# Patient Record
Sex: FEMALE | Race: WHITE | NOT HISPANIC OR LATINO | Employment: OTHER | URBAN - METROPOLITAN AREA
[De-identification: names, ages, dates, MRNs, and addresses within clinical notes are randomized per-mention and may not be internally consistent; named-entity substitution may affect disease eponyms.]

---

## 2017-02-07 ENCOUNTER — HISTORICAL (OUTPATIENT)
Dept: ADMINISTRATIVE | Facility: HOSPITAL | Age: 72
End: 2017-02-07

## 2017-05-20 ENCOUNTER — HOSPITAL ENCOUNTER (EMERGENCY)
Facility: OTHER | Age: 72
Discharge: HOME | End: 2017-05-20
Attending: EMERGENCY MEDICINE
Payer: MEDICARE

## 2017-05-20 VITALS
SYSTOLIC BLOOD PRESSURE: 160 MMHG | TEMPERATURE: 99 F | DIASTOLIC BLOOD PRESSURE: 81 MMHG | RESPIRATION RATE: 18 BRPM | WEIGHT: 190 LBS | OXYGEN SATURATION: 98 % | HEART RATE: 76 BPM

## 2017-05-20 DIAGNOSIS — F41.1 ANXIETY REACTION: Primary | ICD-10-CM

## 2017-05-20 DIAGNOSIS — J30.9 ALLERGIC RHINITIS, UNSPECIFIED ALLERGIC RHINITIS TRIGGER, UNSPECIFIED RHINITIS SEASONALITY: ICD-10-CM

## 2017-05-20 DIAGNOSIS — I10 HTN (HYPERTENSION): ICD-10-CM

## 2017-05-20 LAB
ALBUMIN SERPL-MCNC: 3.6 G/DL (ref 3.3–5.5)
ALP SERPL-CCNC: 93 U/L (ref 42–141)
BILIRUB SERPL-MCNC: 0.6 MG/DL (ref 0.2–1.6)
BUN SERPL-MCNC: 11 MG/DL (ref 7–22)
CALCIUM SERPL-MCNC: 9.7 MG/DL (ref 8–10.3)
CHLORIDE SERPL-SCNC: 105 MMOL/L (ref 98–108)
CREAT SERPL-MCNC: 0.5 MG/DL (ref 0.6–1.2)
GLUCOSE SERPL-MCNC: 93 MG/DL (ref 73–118)
POC ALT (SGPT): 23 U/L (ref 10–47)
POC AST (SGOT): 26 U/L (ref 11–38)
POC CARDIAC TROPONIN I: 0 NG/ML
POC RAPID STREP A: NEGATIVE
POC TCO2: 27 MMOL/L (ref 18–33)
POTASSIUM BLD-SCNC: 4.3 MMOL/L (ref 3.6–5.1)
PROTEIN, POC: 6.6 G/DL (ref 6.4–8.1)
SAMPLE: NORMAL
SODIUM BLD-SCNC: 144 MMOL/L (ref 128–145)

## 2017-05-20 PROCEDURE — 93005 ELECTROCARDIOGRAM TRACING: CPT

## 2017-05-20 PROCEDURE — 25000003 PHARM REV CODE 250: Performed by: EMERGENCY MEDICINE

## 2017-05-20 PROCEDURE — 99284 EMERGENCY DEPT VISIT MOD MDM: CPT

## 2017-05-20 PROCEDURE — 93010 ELECTROCARDIOGRAM REPORT: CPT | Mod: ,,, | Performed by: INTERNAL MEDICINE

## 2017-05-20 RX ORDER — LORAZEPAM 1 MG/1
1 TABLET ORAL
Status: COMPLETED | OUTPATIENT
Start: 2017-05-20 | End: 2017-05-20

## 2017-05-20 RX ORDER — CETIRIZINE HYDROCHLORIDE 5 MG/1
5 TABLET ORAL DAILY
COMMUNITY

## 2017-05-20 RX ORDER — ASPIRIN 81 MG/1
81 TABLET ORAL DAILY
Status: ON HOLD | COMMUNITY
End: 2023-04-11 | Stop reason: HOSPADM

## 2017-05-20 RX ORDER — ROSUVASTATIN CALCIUM 5 MG/1
5 TABLET, COATED ORAL DAILY
Status: ON HOLD | COMMUNITY
End: 2023-04-11 | Stop reason: CLARIF

## 2017-05-20 RX ORDER — LORAZEPAM 1 MG/1
0.5 TABLET ORAL EVERY 8 HOURS PRN
Qty: 5 TABLET | Refills: 0 | Status: SHIPPED | OUTPATIENT
Start: 2017-05-20 | End: 2024-03-13

## 2017-05-20 RX ORDER — FLUTICASONE PROPIONATE 50 MCG
2 SPRAY, SUSPENSION (ML) NASAL 2 TIMES DAILY
Qty: 1 BOTTLE | Refills: 0 | Status: SHIPPED | OUTPATIENT
Start: 2017-05-20

## 2017-05-20 RX ADMIN — LORAZEPAM 1 MG: 1 TABLET ORAL at 11:05

## 2017-05-20 NOTE — ED PROVIDER NOTES
Encounter Date: 5/20/2017       History     Chief Complaint   Patient presents with    Hypertension     states her pressure has been high and her throat is sore     Review of patient's allergies indicates:   Allergen Reactions    Bactrim [sulfamethoxazole-trimethoprim] Nausea And Vomiting    Lortab [hydrocodone-acetaminophen] Itching     CC Bloop pressure elevated this morning while visiting dying mother in the hospital.  Patient feels very stressed out over her mothers declining health. Also reports a sore throat and runny nose for the last few days.  No headache, numbness, tingling, or weakness      The history is provided by the patient.   Hypertension    This is a recurrent problem. The current episode started today. The problem has been gradually worsening. Associated symptoms include anxiety. Pertinent negatives include no chest pain, no palpitations, no confusion, no malaise/fatigue, no blurred vision, no headaches, no neck pain, no peripheral edema, no dizziness and no shortness of breath. Associated agents: stress. Risk factors include stress.     Past Medical History:   Diagnosis Date    Hyperlipemia     Hypertension      Past Surgical History:   Procedure Laterality Date    KNEE SURGERY      TONSILLECTOMY      TUBAL LIGATION       History reviewed. No pertinent family history.  Social History   Substance Use Topics    Smoking status: Never Smoker    Smokeless tobacco: None    Alcohol use No     Review of Systems   Constitutional: Negative for fever and malaise/fatigue.   HENT: Negative for sore throat.    Eyes: Negative for blurred vision.   Respiratory: Negative for shortness of breath.    Cardiovascular: Negative for chest pain and palpitations.   Gastrointestinal: Negative for nausea.   Genitourinary: Negative for dysuria.   Musculoskeletal: Negative for back pain, gait problem and neck pain.   Skin: Negative for rash.   Neurological: Negative for dizziness, syncope, weakness and headaches.    Hematological: Does not bruise/bleed easily.   Psychiatric/Behavioral: Negative for confusion. The patient is nervous/anxious.    All other systems reviewed and are negative.      Physical Exam   Initial Vitals   BP Pulse Resp Temp SpO2   05/20/17 1106 05/20/17 1106 05/20/17 1106 05/20/17 1106 05/20/17 1106   160/81 76 18 98.7 °F (37.1 °C) 98 %     Physical Exam    Nursing note and vitals reviewed.  Constitutional: She appears well-developed and well-nourished. No distress (anxious).   HENT:   Head: Normocephalic and atraumatic.   Right Ear: External ear normal.   Left Ear: External ear normal.   Mouth/Throat: Oropharynx is clear and moist.   Eyes: EOM are normal. Pupils are equal, round, and reactive to light. No scleral icterus.   Neck: Normal range of motion. Neck supple. JVD present.   Cardiovascular: Normal rate, regular rhythm, normal heart sounds and intact distal pulses. Exam reveals no gallop and no friction rub.    No murmur heard.  Pulmonary/Chest: Breath sounds normal. No respiratory distress. She has no wheezes. She has no rhonchi. She has no rales. She exhibits no tenderness.   Abdominal: Soft. Bowel sounds are normal. She exhibits no distension. There is no tenderness. There is no rebound.   Musculoskeletal: Normal range of motion. She exhibits no edema or tenderness.   Neurological: She is alert and oriented to person, place, and time. She has normal strength. She displays normal reflexes. No cranial nerve deficit or sensory deficit.   Skin: Skin is warm and dry.   Psychiatric: Her speech is normal and behavior is normal. Thought content normal. Her mood appears anxious.         ED Course   Procedures  Labs Reviewed   POCT CMP - Abnormal; Notable for the following:        Result Value    POC Creatinine 0.5 (*)     All other components within normal limits   POCT STREP A, RAPID - Abnormal; Notable for the following:     POC Rapid Strep A Negative (*)     All other components within normal limits    TROPONIN ISTAT   POCT CBC   POCT CMP   POCT TROPONIN   POCT STREP A, RAPID     EKG Readings: (Independently Interpreted)   Initial Reading: No STEMI. Rhythm: Normal Sinus Rhythm. Heart Rate: 65. Axis: Normal. Clinical Impression: Normal Sinus Rhythm Other Impression: Normal EKG no ST elevation     Imaging Results          CT Head Without Contrast (Final result)  Result time 05/20/17 12:54:01    Final result by Jeffrey Pyle MD (05/20/17 12:54:01)                 Narrative:    Study Desc:   CT HEAD WITHOUT CONTRAST  Clinical History: Headache, hypertension  Comparison exam: None.     Technique: Noncontrast axial CT of the head performed from the vertex through the skull   base.  Axial, sagittal and coronal multiplanar reconstructions provided.  Exam DLP: 646 mGy-cm     Findings:  No acute intracranial hemorrhage or extra-axial fluid collection.  The grey-white   interface is maintained.  No hydrocephalus, space-occupying mass, mass effect, or midline   shift.  Mild age-related minimal volume loss.  Scattered periventricular and subcortical   white matter hypodensities not advanced for age.  The ventricles, sulci, and basal   cisterns are unremarkable.     The visualized orbits, mastoids and paranasal sinuses are unremarkable.  The calvarium   appears intact.     If there is further clinical concern for intracranial pathology, MRI of the brain may be   performed for further assessment.     Impression:  1.  No acute intracranial abnormality identified.  No acute intracranial hemorrhage, mass   effect, or recent territorial ischemia.     SL: 23  Signed by: Jeffrey Pyle MD  2017-05-20 12:54:00 [CDT]                             X-Ray Chest PA And Lateral (Final result)  Result time 05/20/17 12:19:10    Final result by Juan Grier MD (05/20/17 12:19:10)                 Narrative:    Study Desc:   XR CHEST PA AND LATERAL  History: Hypertension.     Comparison exam: None.     TECHNIQUE:  2 view(s) of the  chest     FINDINGS:  The lungs are clear. No pleural effusion or pneumothorax.     Heart size is within normal limits. Mediastinal contours are unremarkable.     No acute osseous abnormality.     IMPRESSION:  1. No evidence of acute cardiopulmonary disease.     SL: 24 Signed by: Juan Grier MD  2017-05-20 12:18:13                                                     ED Course       MDM  Number of Diagnoses or Management Options  Allergic rhinitis, unspecified allergic rhinitis trigger, unspecified rhinitis seasonality: established, worsening  Anxiety reaction: established, worsening  HTN (hypertension): established, worsening  Diagnosis management comments: CC HTN and stress.  Feeling anxious, has a prior diagnosis of anxiety.  No Headach, SI/HI.    Treatment in the ED PE, ativan, aspirin.   Patient feels much better and is ready for discharge.  Discussed labs, and imaging results.    Fill and take prescriptions as directed.  Answered questions and discussed discharge plan.    Follow up with PCP in 1 days.         Amount and/or Complexity of Data Reviewed  Clinical lab tests: ordered and reviewed  Tests in the radiology section of CPT®: ordered and reviewed  Tests in the medicine section of CPT®: ordered and reviewed  Independent visualization of images, tracings, or specimens: no    Risk of Complications, Morbidity, and/or Mortality  Presenting problems: moderate  Diagnostic procedures: moderate  Management options: moderate    Patient Progress  Patient progress: improved    Clinical Impression:   The primary encounter diagnosis was Anxiety reaction. Diagnoses of HTN (hypertension) and Allergic rhinitis, unspecified allergic rhinitis trigger, unspecified rhinitis seasonality were also pertinent to this visit.            Misa Schwartz DO  05/23/17 [id number]

## 2017-05-20 NOTE — DISCHARGE INSTRUCTIONS
Understanding Anxiety Disorders  Almost everyone gets nervous now and then. Its normal to have knots in your stomach before a test, or for your heart to race on a first date. But an anxiety disorder is much more than a case of nerves. In fact, its symptoms may be overwhelming. But treatment can relieve many of these symptoms. Talking to your doctor is the first step.    What are anxiety disorders?  An anxiety disorder causes intense feelings of panic and fear. These feelings may arise for no apparent reason. And they tend to recur again and again. They may prevent you from coping with life and cause you great distress. As a result, you may avoid anything that triggers your fear. In extreme cases, you may never leave the house. Anxiety disorders may cause other symptoms, such as:  · Obsessive thoughts you cant control  · Constant nightmares or painful thoughts of the past  · Nausea, sweating, and muscle tension  · Difficulty sleeping or concentrating  What causes anxiety disorders?  Anxiety disorders tend to run in families. For some people, childhood abuse or neglect may play a role. For others, stressful life events or trauma may trigger anxiety disorders. Anxiety can trigger low self-esteem and poor coping skills.  Common anxiety disorders  · Panic disorder: This causes an intense fear of being in danger.  · Phobias: These are extreme fears of certain objects, places, or events.  · Obsessive-compulsive disorder: This causes you to have unwanted thoughts. You also may perform certain actions over and over.  · Posttraumatic stress disorder: This occurs in people who have survived a terrible ordeal. It can cause nightmares and flashbacks about the event.  · Generalized anxiety disorder: This causes constant worry that can greatly disrupt your life.   Getting better  You may believe that nothing can help you. Or, you might fear what others may think. But most anxiety symptoms can be eased. Having an anxiety  disorder is nothing to be ashamed of. Most people do best with treatment that combines medication and therapy. Although these arent cures, they can help you live a healthier life.  Date Last Reviewed: 2/11/2015 © 2000-2016 [employer]. [address] [place] [address], [place] [address]. All rights reserved. This information is not intended as a substitute for professional medical care. Always follow your healthcare professional's instructions.        Allergic Rhinitis  Allergic rhinitis is an allergic reaction that affects the nose, and often the eyes. Its often known as nasal allergies. Nasal allergies are often due to things in the environment that are breathed in. Depending what you are sensitive to, nasal allergies may occur only during certain seasons. Or they may occur year round. Common indoor allergens include house dust mites, mold, cockroaches, and pet dander. Outdoor allergens include pollen from trees, grasses, and weeds.   Symptoms include a drippy, stuffy, and itchy nose. They also include sneezing and red and itchy eyes. You may feel tired more often. Severe allergies may also affect your breathing and trigger a condition called asthma.   Tests can be done to see what allergens are affecting you. You may be referred to an allergy specialist for testing and further evaluation.  Home care  The healthcare provider may prescribe medicines to help relieve allergy symptoms.   Ask the provider for advice on how to avoid substances that you are allergic to. Below are a few tips for each type of allergen.  Pet dander:  · Do not have pets with fur and feathers.  · If you cannot avoid having a pet, keep it out of your bedroom and off upholstered furniture.  Pollen:  · When pollen counts are high, keep windows of your car and home closed. If possible, use an air conditioner instead.  · Wear a filter mask when mowing or doing yard work.  House dust mites:  · Wash bedding every week in warm water and  detergent and dry on a hot setting.  · Cover the mattress, box spring, and pillows with allergy covers.   · If possible, sleep in a room with no carpet, curtains, or upholstered furniture.  Cockroaches:  · Store food in sealed containers.  · Remove garbage from the home promptly.  · Fix water leaks  Mold:  · Keep humidity low by using a dehumidifier or air conditioner. Keep the dehumidifier and air conditioner clean and free of mold.  · Clean moldy areas with bleach and water.  In general:  · Vacuum once or twice a week. If possible, use a vacuum with a high-efficiency particulate air (HEPA) filter.  · Do not smoke. Avoid cigarette smoke. Cigarette smoke is an irritant that can make symptoms worse.  Follow-up care  Follow up as advised by the health care provider or our staff. If you were referred to an allergy specialist, make this appointment promptly.  When to seek medical advice  Call your healthcare provider right away if the following occur:  · Coughing or wheezing  · Fever greater than 100.4°F (38°C)  · Continuing symptoms, new symptoms, or worsening symptoms  Call 911 right away if you have:  · Trouble breathing  · Hives (raised red bumps)  · Severe swelling of the face or severe itching of the eyes or mouth  Date Last Reviewed: 4/26/2015  © 2120-8967 [employer]. [address] [place] [id number]. All rights reserved. This information is not intended as a substitute for professional medical care. Always follow your healthcare professional's instructions.        Taking Your Blood Pressure  Blood pressure is the force of blood against the artery wall as it moves from the heart through the blood vessels. You can take your own blood pressure reading using a digital monitor. Take readings as often as your healthcare provider instructs. Take each reading at the same time of day.  Step 1. Relax    · Take your blood pressure at the same time every day, such as in the morning or evening, or at  the time your healthcare provider recommends.  · Wait at least a half-hour after smoking, eating, drinking caffeinated beverages, or exercising.  · Sit comfortably at a table with both feet on the floor. Do not cross your legs or feet. Place the monitor near you.  · Rest for a few minutes before you begin.  Step 2. Wrap the cuff    · Place your arm on the table, palm up. Your arm should be at the level of your heart. Wrap the cuff around your upper arm, just above your elbow. Its best done on bare skin, not over clothing. Most cuffs will indicate where the brachial artery (the blood vessel in the middle of the arm at the inner side of the elbow) should line up with the cuff. Look in your monitor's instruction booklet for an illustration. You can also bring your cuff to your healthcare provider and have them show you how to correctly place the cuff.  · Make sure your cuff fits. If it doesnt wrap around your upper arm, order a larger cuff.  Step 3. Inflate the cuff    · Pump the cuff until the scale reads 160. If you have a self-inflating cuff, push the button that starts the pump.  · The cuff will tighten, then loosen.  · The numbers will change. When they stop changing, your blood pressure reading will appear.  · Take 2 or 3 readings one minute apart.  Step 4. Write down the results of each reading    · Write down your blood pressure numbers for each reading. Note the date and time. Keep your results in one place, such as a notebook. Even if your monitor has a built-in memory, keep a hard copy of the readings.  · Remove the cuff from your arm. Turn off the machine.  · Share your blood pressure records with your healthcare providers at each visit.  Date Last Reviewed: 4/27/2016  © 0742-1586 [employer]. [address] [place] [address], [place] [address]. All rights reserved. This information is not intended as a substitute for professional medical care. Always follow your healthcare professional's  instructions.

## 2017-05-20 NOTE — ED TRIAGE NOTES
Got B/P taken 173/87 by makayla in [facility] and told to be evaluated / pt seen several times for BP at PCP and told to just monitor it

## 2017-05-20 NOTE — ED AVS SNAPSHOT
[facility] EMERGENCY DEPARTMENT  [address] [facility] [address]               Jeana Muhammad   2017 11:11 AM   ED    Description:  Female : 1945   Department:  [facility] Emergency Department           Your Care was Coordinated By:     Provider Role From To    Misa Schwratz DO Attending Provider 17 1114 --      Reason for Visit     Hypertension           Diagnoses this Visit        Comments    Anxiety reaction    -  Primary     HTN (hypertension)         Allergic rhinitis, unspecified allergic rhinitis trigger, unspecified rhinitis seasonality           ED Disposition     None           To Do List           Follow-up Information     Follow up with [facility] Emergency Department.    Specialty:  Emergency Medicine    Why:  If symptoms worsen    Contact information:    [address] [facility] [address]  [phone]       These Medications        Disp Refills Start End    fluticasone (FLONASE) 50 mcg/actuation nasal spray 1 Bottle 0 2017     2 sprays by Each Nare route 2 (two) times daily. - Each Nare    lorazepam (ATIVAN) 1 MG tablet 5 tablet 0 2017    Take 0.5 tablets (0.5 mg total) by mouth every 8 (eight) hours as needed for Anxiety. - Oral      [facility]     [facility] On Call Nurse Care Line - 24/ Assistance  Unless otherwise directed by your provider, please contact [facility], our nurse care line that is available for  assistance.     Registered nurses in the [facility] On Call Center provide: appointment scheduling, clinical advisement, health education, and other advisory services.  Call: [phone] (toll free)               Medications           Message regarding Medications     Verify the changes and/or additions to your medication regime listed below are the same as discussed with your clinician today.  If any of these changes or additions are incorrect, please notify your healthcare provider.        START taking these  NEW medications        Refills    fluticasone (FLONASE) 50 mcg/actuation nasal spray 0    Si sprays by Each Nare route 2 (two) times daily.    Class: Print    Route: Each Nare    lorazepam (ATIVAN) 1 MG tablet 0    Sig: Take 0.5 tablets (0.5 mg total) by mouth every 8 (eight) hours as needed for Anxiety.    Class: Print    Route: Oral      These medications were administered today        Dose Freq    lorazepam tablet 1 mg 1 mg ED 1 Time    Sig: Take 1 tablet (1 mg total) by mouth ED 1 Time.    Class: Normal    Route: Oral           Verify that the below list of medications is an accurate representation of the medications you are currently taking.  If none reported, the list may be blank. If incorrect, please contact your healthcare provider. Carry this list with you in case of emergency.           Current Medications     aspirin (ECOTRIN) 81 MG EC tablet Take 81 mg by mouth once daily.    cetirizine (ZYRTEC) 5 MG tablet Take 5 mg by mouth once daily.    rosuvastatin (CRESTOR) 5 MG tablet Take 5 mg by mouth once daily.    fluticasone (FLONASE) 50 mcg/actuation nasal spray 2 sprays by Each Nare route 2 (two) times daily.    lorazepam (ATIVAN) 1 MG tablet Take 0.5 tablets (0.5 mg total) by mouth every 8 (eight) hours as needed for Anxiety.    lorazepam tablet 1 mg Take 1 tablet (1 mg total) by mouth ED 1 Time.           Clinical Reference Information           Your Vitals Were     BP Pulse Temp Resp Weight Last Period    160/81 76 98.7 °F (37.1 °C) 18 86.2 kg (190 lb) (LMP Unknown)    SpO2                   98%           Allergies as of 2017        Reactions    Bactrim [Sulfamethoxazole-trimethoprim] Nausea And Vomiting    Lortab [Hydrocodone-acetaminophen] Itching      Immunizations Administered on Date of Encounter - 2017     None      ED Micro, Lab, POCT     Start Ordered       Status Ordering Provider    17 1138 17 1138  POCT Rapid Strep A  Once      Final result     17 1135 17  1135  Troponin ISTAT  Once      Final result     05/20/17 1127 05/20/17 1127  POCT CMP  Once      Final result     05/20/17 1123 05/20/17 1122  POCT Rapid Strep A  Once      Completed     05/20/17 1122 05/20/17 1121  POCT CBC  Once      Final result     05/20/17 1122 05/20/17 1121  POCT CMP  Once      Completed     05/20/17 1122 05/20/17 1121  POCT Troponin  Once      Completed       ED Imaging Orders     Start Ordered       Status Ordering Provider    05/20/17 1122 05/20/17 1121  X-Ray Chest PA And Lateral  1 time imaging      In process         Discharge Instructions         Understanding Anxiety Disorders  Almost everyone gets nervous now and then. Its normal to have knots in your stomach before a test, or for your heart to race on a first date. But an anxiety disorder is much more than a case of nerves. In fact, its symptoms may be overwhelming. But treatment can relieve many of these symptoms. Talking to your doctor is the first step.    What are anxiety disorders?  An anxiety disorder causes intense feelings of panic and fear. These feelings may arise for no apparent reason. And they tend to recur again and again. They may prevent you from coping with life and cause you great distress. As a result, you may avoid anything that triggers your fear. In extreme cases, you may never leave the house. Anxiety disorders may cause other symptoms, such as:  · Obsessive thoughts you cant control  · Constant nightmares or painful thoughts of the past  · Nausea, sweating, and muscle tension  · Difficulty sleeping or concentrating  What causes anxiety disorders?  Anxiety disorders tend to run in families. For some people, childhood abuse or neglect may play a role. For others, stressful life events or trauma may trigger anxiety disorders. Anxiety can trigger low self-esteem and poor coping skills.  Common anxiety disorders  · Panic disorder: This causes an intense fear of being in danger.  · Phobias: These are extreme  fears of certain objects, places, or events.  · Obsessive-compulsive disorder: This causes you to have unwanted thoughts. You also may perform certain actions over and over.  · Posttraumatic stress disorder: This occurs in people who have survived a terrible ordeal. It can cause nightmares and flashbacks about the event.  · Generalized anxiety disorder: This causes constant worry that can greatly disrupt your life.   Getting better  You may believe that nothing can help you. Or, you might fear what others may think. But most anxiety symptoms can be eased. Having an anxiety disorder is nothing to be ashamed of. Most people do best with treatment that combines medication and therapy. Although these arent cures, they can help you live a healthier life.  Date Last Reviewed: 2/11/2015 © 2000-2016 [employer]. [address] [place] [address], [place] [address]. All rights reserved. This information is not intended as a substitute for professional medical care. Always follow your healthcare professional's instructions.        Allergic Rhinitis  Allergic rhinitis is an allergic reaction that affects the nose, and often the eyes. Its often known as nasal allergies. Nasal allergies are often due to things in the environment that are breathed in. Depending what you are sensitive to, nasal allergies may occur only during certain seasons. Or they may occur year round. Common indoor allergens include house dust mites, mold, cockroaches, and pet dander. Outdoor allergens include pollen from trees, grasses, and weeds.   Symptoms include a drippy, stuffy, and itchy nose. They also include sneezing and red and itchy eyes. You may feel tired more often. Severe allergies may also affect your breathing and trigger a condition called asthma.   Tests can be done to see what allergens are affecting you. You may be referred to an allergy specialist for testing and further evaluation.  Home care  The healthcare provider may prescribe  medicines to help relieve allergy symptoms.   Ask the provider for advice on how to avoid substances that you are allergic to. Below are a few tips for each type of allergen.  Pet dander:  · Do not have pets with fur and feathers.  · If you cannot avoid having a pet, keep it out of your bedroom and off upholstered furniture.  Pollen:  · When pollen counts are high, keep windows of your car and home closed. If possible, use an air conditioner instead.  · Wear a filter mask when mowing or doing yard work.  House dust mites:  · Wash bedding every week in warm water and detergent and dry on a hot setting.  · Cover the mattress, box spring, and pillows with allergy covers.   · If possible, sleep in a room with no carpet, curtains, or upholstered furniture.  Cockroaches:  · Store food in sealed containers.  · Remove garbage from the home promptly.  · Fix water leaks  Mold:  · Keep humidity low by using a dehumidifier or air conditioner. Keep the dehumidifier and air conditioner clean and free of mold.  · Clean moldy areas with bleach and water.  In general:  · Vacuum once or twice a week. If possible, use a vacuum with a high-efficiency particulate air (HEPA) filter.  · Do not smoke. Avoid cigarette smoke. Cigarette smoke is an irritant that can make symptoms worse.  Follow-up care  Follow up as advised by the health care provider or our staff. If you were referred to an allergy specialist, make this appointment promptly.  When to seek medical advice  Call your healthcare provider right away if the following occur:  · Coughing or wheezing  · Fever greater than 100.4°F (38°C)  · Continuing symptoms, new symptoms, or worsening symptoms  Call 911 right away if you have:  · Trouble breathing  · Hives (raised red bumps)  · Severe swelling of the face or severe itching of the eyes or mouth  Date Last Reviewed: 4/26/2015  © 8345-3656 [employer]. [address] [place] [address], [place] [address]. All rights reserved. This  information is not intended as a substitute for professional medical care. Always follow your healthcare professional's instructions.        Taking Your Blood Pressure  Blood pressure is the force of blood against the artery wall as it moves from the heart through the blood vessels. You can take your own blood pressure reading using a digital monitor. Take readings as often as your healthcare provider instructs. Take each reading at the same time of day.  Step 1. Relax    · Take your blood pressure at the same time every day, such as in the morning or evening, or at the time your healthcare provider recommends.  · Wait at least a half-hour after smoking, eating, drinking caffeinated beverages, or exercising.  · Sit comfortably at a table with both feet on the floor. Do not cross your legs or feet. Place the monitor near you.  · Rest for a few minutes before you begin.  Step 2. Wrap the cuff    · Place your arm on the table, palm up. Your arm should be at the level of your heart. Wrap the cuff around your upper arm, just above your elbow. Its best done on bare skin, not over clothing. Most cuffs will indicate where the brachial artery (the blood vessel in the middle of the arm at the inner side of the elbow) should line up with the cuff. Look in your monitor's instruction booklet for an illustration. You can also bring your cuff to your healthcare provider and have them show you how to correctly place the cuff.  · Make sure your cuff fits. If it doesnt wrap around your upper arm, order a larger cuff.  Step 3. Inflate the cuff    · Pump the cuff until the scale reads 160. If you have a self-inflating cuff, push the button that starts the pump.  · The cuff will tighten, then loosen.  · The numbers will change. When they stop changing, your blood pressure reading will appear.  · Take 2 or 3 readings one minute apart.  Step 4. Write down the results of each reading    · Write down your blood pressure numbers for each  reading. Note the date and time. Keep your results in one place, such as a notebook. Even if your monitor has a built-in memory, keep a hard copy of the readings.  · Remove the cuff from your arm. Turn off the machine.  · Share your blood pressure records with your healthcare providers at each visit.  Date Last Reviewed: 4/27/2016  © 0560-7544 [employer]. [address] [place] [address], [place] [address]. All rights reserved. This information is not intended as a substitute for professional medical care. Always follow your healthcare professional's instructions.          [facility] Sign-Up     Activating your [facility] account is as easy as 1-2-3!     1) Visit [email or web address], select Sign Up Now, enter this activation code and your date of birth, then select Next.  [email or web address]  Expires: 7/4/2017 12:12 PM      2) Create a username and password to use when you visit [facility] in the future and select a security question in case you lose your password and select Next.    3) Enter your e-mail address and click Sign Up!    Additional Information  If you have questions, please e-mail [email or web address] or call [phone] to talk to our [facility] staff. Remember, [facility] is NOT to be used for urgent needs. For medical emergencies, dial 911.          [facility] Emergency Department complies with applicable Federal civil rights laws and does not discriminate on the basis of race, color, national origin, age, disability, or sex.        Language Assistance Services     ATTENTION: Language assistance services are available, free of charge. Please call [phone].      ATENCIÓN: Si habla español, tiene a rea disposición servicios gratuitos de asistencia lingüística. Llame al [phone].     CHÚ Ý: N?u b?n nói Ti?ng Vi?t, có các d?ch v? h? tr? ngôn ng? mi?n phí dành cho b?n. G?i s? [phone].

## 2018-03-22 ENCOUNTER — HOSPITAL ENCOUNTER (EMERGENCY)
Facility: OTHER | Age: 73
Discharge: HOME | End: 2018-03-23
Attending: EMERGENCY MEDICINE
Payer: MEDICARE

## 2018-03-22 DIAGNOSIS — I10 HYPERTENSION: ICD-10-CM

## 2018-03-22 PROCEDURE — 93005 ELECTROCARDIOGRAM TRACING: CPT

## 2018-03-22 PROCEDURE — 93010 ELECTROCARDIOGRAM REPORT: CPT | Mod: ,,, | Performed by: INTERNAL MEDICINE

## 2018-03-22 PROCEDURE — 99284 EMERGENCY DEPT VISIT MOD MDM: CPT

## 2018-03-23 VITALS
WEIGHT: 193 LBS | HEIGHT: 66 IN | DIASTOLIC BLOOD PRESSURE: 87 MMHG | HEART RATE: 78 BPM | BODY MASS INDEX: 31.02 KG/M2 | RESPIRATION RATE: 16 BRPM | TEMPERATURE: 98 F | SYSTOLIC BLOOD PRESSURE: 187 MMHG | OXYGEN SATURATION: 98 %

## 2018-03-23 LAB
ALBUMIN SERPL-MCNC: 3.7 G/DL (ref 3.3–5.5)
ALP SERPL-CCNC: 111 U/L (ref 42–141)
BILIRUB SERPL-MCNC: 0.6 MG/DL (ref 0.2–1.6)
BUN SERPL-MCNC: 11 MG/DL (ref 7–22)
CALCIUM SERPL-MCNC: 10.8 MG/DL (ref 8–10.3)
CHLORIDE SERPL-SCNC: 104 MMOL/L (ref 98–108)
CREAT SERPL-MCNC: 0.6 MG/DL (ref 0.6–1.2)
GLUCOSE SERPL-MCNC: 99 MG/DL (ref 73–118)
POC ALT (SGPT): 16 U/L (ref 10–47)
POC AST (SGOT): 24 U/L (ref 11–38)
POC B-TYPE NATRIURETIC PEPTIDE: 95.2 PG/ML (ref 0–100)
POC CARDIAC TROPONIN I: 0 NG/ML
POC TCO2: 26 MMOL/L (ref 18–33)
POTASSIUM BLD-SCNC: 3.8 MMOL/L (ref 3.6–5.1)
PROTEIN, POC: 7 G/DL (ref 6.4–8.1)
SAMPLE: NORMAL
SODIUM BLD-SCNC: 144 MMOL/L (ref 128–145)

## 2018-03-23 PROCEDURE — 80053 COMPREHEN METABOLIC PANEL: CPT

## 2018-03-23 PROCEDURE — 83880 ASSAY OF NATRIURETIC PEPTIDE: CPT

## 2018-03-23 PROCEDURE — 84484 ASSAY OF TROPONIN QUANT: CPT

## 2018-03-23 PROCEDURE — 85025 COMPLETE CBC W/AUTO DIFF WBC: CPT

## 2018-03-23 PROCEDURE — 25000003 PHARM REV CODE 250: Performed by: EMERGENCY MEDICINE

## 2018-03-23 RX ADMIN — LIDOCAINE HYDROCHLORIDE: 20 SOLUTION ORAL; TOPICAL at 02:03

## 2018-03-23 NOTE — DISCHARGE INSTRUCTIONS
Check blood pressure once daily in the morning and keep a log.  Follow-up with your primary care doctor as soon as possible for blood pressure check and to initiate blood pressure medications if indicated.

## 2018-03-23 NOTE — ED PROVIDER NOTES
"Encounter Date: 3/22/2018       History     Chief Complaint   Patient presents with    Hypertension     pt presents with c/o elevated BP this evening; denies Hx of HTN; denies CP, SOB, unilateral weakness; reports increased "belching"; reports x1 episode of nasuea today; reports Hx of anxiety     72 y.o. Female with hypertension, hyperlipidemia presents to the emergency department complaining of "not feeling well" since 6 PM this evening.  Patient reports symptoms began after she ate dinner during which time she experienced dizziness, increased belching, had a mild headache and felt nauseated.  She reports taking her blood pressure at this time and noticed that it was 173/92.  She denies chest pain, palpitations, shortness of breath, diaphoresis, vomiting, focal weakness, abdominal pain, neck pain, fever, neck stiffness or vision disturbance. Symptoms currently resolved.       The history is provided by the patient.     Review of patient's allergies indicates:   Allergen Reactions    Bactrim [sulfamethoxazole-trimethoprim] Nausea And Vomiting    Lortab [hydrocodone-acetaminophen] Itching     Past Medical History:   Diagnosis Date    Hyperlipemia     Hypertension      Past Surgical History:   Procedure Laterality Date    KNEE SURGERY      TONSILLECTOMY      TUBAL LIGATION       No family history on file.  Social History   Substance Use Topics    Smoking status: Never Smoker    Smokeless tobacco: Not on file    Alcohol use No     Review of Systems   Constitutional: Negative for chills and fever.   HENT: Negative.    Eyes: Negative.    Respiratory: Negative for cough, shortness of breath and stridor.    Cardiovascular: Negative for chest pain and palpitations.   Gastrointestinal: Negative for nausea and vomiting.   Genitourinary: Negative for dysuria and hematuria.   Musculoskeletal: Negative.    Skin: Negative.    Neurological: Positive for dizziness. Negative for syncope, weakness, light-headedness, " numbness and headaches.   Psychiatric/Behavioral: Negative.    All other systems reviewed and are negative.      Physical Exam     Initial Vitals [03/22/18 2325]   BP Pulse Resp Temp SpO2   (!) 200/64 81 16 98.1 °F (36.7 °C) 97 %      MAP       109.33         Physical Exam    Nursing note and vitals reviewed.  Constitutional: She appears well-developed and well-nourished. She is not diaphoretic. No distress.   HENT:   Head: Normocephalic and atraumatic.   Eyes: Conjunctivae are normal. Pupils are equal, round, and reactive to light.   Neck: Normal range of motion. Neck supple.   Cardiovascular: Normal rate and intact distal pulses.   Pulmonary/Chest: No accessory muscle usage. No tachypnea. No respiratory distress.   Abdominal: She exhibits no distension. There is no tenderness.   Musculoskeletal: Normal range of motion. She exhibits no tenderness.   Neurological: She is alert and oriented to person, place, and time.   Skin: Skin is warm. Capillary refill takes less than 2 seconds.   Psychiatric: She has a normal mood and affect.         ED Course   Procedures  Labs Reviewed   POCT CMP - Abnormal; Notable for the following:        Result Value    Calcium, POC 10.8 (*)     All other components within normal limits   TROPONIN ISTAT   POCT CBC   POCT CMP   POCT TROPONIN   POCT B-TYPE NATRIURETIC PEPTIDE (BNP)   POCT B-TYPE NATRIURETIC PEPTIDE (BNP)     EKG Readings: (Independently Interpreted)   Initial Reading: No STEMI. Heart Rate: 78. Ectopy: No Ectopy. Conduction: Normal. ST Segments: Normal ST Segments. T Waves: Normal. Axis: Left Axis Deviation.          Medical Decision Making:   Differential Diagnosis:   ACS, CVA, hypertensive emergency, dehydration, symptomatic anemia, hypoxia, dysrhythmia, others  Clinical Tests:   Lab Tests: Ordered and Reviewed  Radiological Study: Ordered and Reviewed  ED Management:  No recurrent symptoms during ED course.              Labs Reviewed  Admission on 03/22/2018, Discharged on  03/23/2018   Component Date Value Ref Range Status    POC Cardiac Troponin I 03/23/2018 0.00  <0.09 ng/mL Final    Sample 03/23/2018 AVANI   Final    Albumin, POC 03/23/2018 3.7  3.3 - 5.5 g/dL Final    Alkaline Phosphatase, POC 03/23/2018 111  42 - 141 U/L Final    ALT (SGPT), POC 03/23/2018 16  10 - 47 U/L Final    AST (SGOT), POC 03/23/2018 24  11 - 38 U/L Final    POC BUN 03/23/2018 11  7 - 22 mg/dL Final    Calcium, POC 03/23/2018 10.8* 8.0 - 10.3 mg/dL Final    POC Chloride 03/23/2018 104  98 - 108 mmol/L Final    POC Creatinine 03/23/2018 0.6  0.6 - 1.2 mg/dL Final    POC Glucose 03/23/2018 99  73 - 118 mg/dL Final    POC Potassium 03/23/2018 3.8  3.6 - 5.1 mmol/L Final    POC Sodium 03/23/2018 144  128 - 145 mmol/L Final    Bilirubin 03/23/2018 0.6  0.2 - 1.6 mg/dL Final    POC TCO2 03/23/2018 26  18 - 33 mmol/L Final    Protein 03/23/2018 7.0  6.4 - 8.1 g/dL Final    POC B-Type Natriuretic Peptide 03/23/2018 95.2  0.0 - 100.0 pg/mL Final        Imaging Reviewed    Imaging Results          CT Head Without Contrast (Final result)  Result time 03/23/18 02:14:28    Final result by Abilio Dai MD (03/23/18 02:14:28)                 Impression:      No acute intracranial abnormality.      Electronically signed by: Abilio Dai MD  Date:    03/23/2018  Time:    02:14             Narrative:    EXAMINATION:  CT HEAD WITHOUT CONTRAST    CLINICAL HISTORY:  Dizziness;    TECHNIQUE:  Low dose axial images were obtained through the head.  Coronal and sagittal reformations were also performed. Contrast was not administered.    COMPARISON:  05/20/2017.    FINDINGS:  No evidence of acute territorial infarct, hemorrhage, mass effect, or midline shift.    Ventricles are normal in size and configuration.    No displaced calvarial fracture.    Visualized paranasal sinuses and mastoid air cells are clear.                              Vitals:    03/22/18 2325 03/22/18 2344 03/23/18 0035 03/23/18 0236  "  BP: (!) 200/64  (!) 189/91 (!) 187/87   BP Location: Right arm  Right arm Left arm   Patient Position: Lying  Sitting Sitting   Pulse: 81 78 74 78   Resp: 16  16 16   Temp: 98.1 °F (36.7 °C)   98.1 °F (36.7 °C)   TempSrc: Oral   Oral   SpO2: 97%  98% 98%   Weight: 87.5 kg (193 lb)      Height: 5' 6" (1.676 m)            Medications given in ED    Medications   (pyxis) gi cocktail (mylanta 30 mL, lidocaine 2 % viscous 10 mL, dicyclomine 10 mL) 50 mL ( Oral Given 3/23/18 0234)     Discharge Medications     Discharge Medication List as of 3/23/2018  2:37 AM      CONTINUE these medications which have NOT CHANGED    Details   aspirin (ECOTRIN) 81 MG EC tablet Take 81 mg by mouth once daily., Until Discontinued, Historical Med      cetirizine (ZYRTEC) 5 MG tablet Take 5 mg by mouth once daily., Until Discontinued, Historical Med      fluticasone (FLONASE) 50 mcg/actuation nasal spray 2 sprays by Each Nare route 2 (two) times daily., Starting 5/20/2017, Until Discontinued, Print      lorazepam (ATIVAN) 1 MG tablet Take 0.5 tablets (0.5 mg total) by mouth every 8 (eight) hours as needed for Anxiety., Starting 5/20/2017, Until Mon 6/19/17, Print      rosuvastatin (CRESTOR) 5 MG tablet Take 5 mg by mouth once daily., Until Discontinued, Historical Med                   Patient discharged to home in stable condition with instructions to:   1. Please take all meds as prescribed.  2. Follow-up with your primary care doctor   3. Return precautions discussed and patient and/or family/caretaker understands to return to the emergency room for any concerns including worsening of your current symptoms, fever, chills, night sweats, worsening pain, chest pain, shortness of breath, nausea, vomiting, diarrhea, bleeding, headache, difficulty talking, visual disturbances, weakness, numbness or any other acute concerns      Note was created using voice recognition software. Note may have occasional typographical errors that may not have " been identified and edited despite good minerva initial review prior to signing.             Clinical Impression:   The encounter diagnosis was Hypertension.                           Hunter Barclay MD  04/29/18 0730

## 2018-03-27 ENCOUNTER — HISTORICAL (OUTPATIENT)
Dept: ADMINISTRATIVE | Facility: HOSPITAL | Age: 73
End: 2018-03-27

## 2019-04-15 ENCOUNTER — HISTORICAL (OUTPATIENT)
Dept: ADMINISTRATIVE | Facility: HOSPITAL | Age: 74
End: 2019-04-15

## 2019-05-20 ENCOUNTER — HISTORICAL (OUTPATIENT)
Dept: ADMINISTRATIVE | Facility: HOSPITAL | Age: 74
End: 2019-05-20

## 2019-05-20 LAB
ABS NEUT (OLG): 4.57 X10(3)/MCL (ref 2.1–9.2)
ALBUMIN SERPL-MCNC: 3.5 GM/DL (ref 3.4–5)
ALBUMIN/GLOB SERPL: 1.1 RATIO (ref 1.1–2)
ALP SERPL-CCNC: 123 UNIT/L (ref 38–126)
ALT SERPL-CCNC: 22 UNIT/L (ref 12–78)
AST SERPL-CCNC: 12 UNIT/L (ref 15–37)
BASOPHILS # BLD AUTO: 0.1 X10(3)/MCL (ref 0–0.2)
BASOPHILS NFR BLD AUTO: 1 %
BILIRUB SERPL-MCNC: 0.4 MG/DL (ref 0.2–1)
BILIRUBIN DIRECT+TOT PNL SERPL-MCNC: 0.1 MG/DL (ref 0–0.5)
BILIRUBIN DIRECT+TOT PNL SERPL-MCNC: 0.3 MG/DL (ref 0–0.8)
BUN SERPL-MCNC: 17 MG/DL (ref 7–18)
CALCIUM SERPL-MCNC: 9.9 MG/DL (ref 8.5–10.1)
CHLORIDE SERPL-SCNC: 106 MMOL/L (ref 98–107)
CHOLEST SERPL-MCNC: 158 MG/DL (ref 0–200)
CHOLEST/HDLC SERPL: 2.9 {RATIO} (ref 0–4)
CO2 SERPL-SCNC: 29 MMOL/L (ref 21–32)
CREAT SERPL-MCNC: 0.48 MG/DL (ref 0.55–1.02)
EOSINOPHIL # BLD AUTO: 0.2 X10(3)/MCL (ref 0–0.9)
EOSINOPHIL NFR BLD AUTO: 3 %
ERYTHROCYTE [DISTWIDTH] IN BLOOD BY AUTOMATED COUNT: 13.9 % (ref 11.5–17)
GLOBULIN SER-MCNC: 3.2 GM/DL (ref 2.4–3.5)
GLUCOSE SERPL-MCNC: 90 MG/DL (ref 74–106)
HCT VFR BLD AUTO: 39 % (ref 37–47)
HDLC SERPL-MCNC: 55 MG/DL (ref 35–60)
HGB BLD-MCNC: 11.9 GM/DL (ref 12–16)
LDLC SERPL CALC-MCNC: 71 MG/DL (ref 0–129)
LYMPHOCYTES # BLD AUTO: 1.9 X10(3)/MCL (ref 0.6–4.6)
LYMPHOCYTES NFR BLD AUTO: 26 %
MCH RBC QN AUTO: 29.6 PG (ref 27–31)
MCHC RBC AUTO-ENTMCNC: 30.5 GM/DL (ref 33–36)
MCV RBC AUTO: 97 FL (ref 80–94)
MONOCYTES # BLD AUTO: 0.6 X10(3)/MCL (ref 0.1–1.3)
MONOCYTES NFR BLD AUTO: 8 %
NEUTROPHILS # BLD AUTO: 4.57 X10(3)/MCL (ref 2.1–9.2)
NEUTROPHILS NFR BLD AUTO: 61 %
PLATELET # BLD AUTO: 320 X10(3)/MCL (ref 130–400)
PMV BLD AUTO: 11.7 FL (ref 9.4–12.4)
POTASSIUM SERPL-SCNC: 4.3 MMOL/L (ref 3.5–5.1)
PROT SERPL-MCNC: 6.7 GM/DL (ref 6.4–8.2)
RBC # BLD AUTO: 4.02 X10(6)/MCL (ref 4.2–5.4)
SODIUM SERPL-SCNC: 139 MMOL/L (ref 136–145)
TRIGL SERPL-MCNC: 159 MG/DL (ref 30–150)
TSH SERPL-ACNC: 1.88 MIU/L (ref 0.36–3.74)
VLDLC SERPL CALC-MCNC: 32 MG/DL
WBC # SPEC AUTO: 7.4 X10(3)/MCL (ref 4.5–11.5)

## 2019-06-28 ENCOUNTER — HISTORICAL (OUTPATIENT)
Dept: ADMINISTRATIVE | Facility: HOSPITAL | Age: 74
End: 2019-06-28

## 2019-07-15 ENCOUNTER — HISTORICAL (OUTPATIENT)
Dept: ADMINISTRATIVE | Facility: HOSPITAL | Age: 74
End: 2019-07-15

## 2019-07-15 LAB
APPEARANCE, UA: CLEAR
BACTERIA #/AREA URNS AUTO: ABNORMAL /[HPF]
BILIRUB UR QL STRIP: NEGATIVE
COLOR UR: YELLOW
GLUCOSE (UA): NORMAL
HGB UR QL STRIP: NEGATIVE
HYALINE CASTS #/AREA URNS LPF: ABNORMAL /[LPF]
KETONES UR QL STRIP: NEGATIVE
LEUKOCYTE ESTERASE UR QL STRIP: NEGATIVE
NITRITE UR QL STRIP: NEGATIVE
PH UR STRIP: 5 [PH] (ref 4.5–8)
PROT UR QL STRIP: NEGATIVE
RBC #/AREA URNS AUTO: ABNORMAL /[HPF]
SP GR UR STRIP: 1.01 (ref 1–1.03)
SQUAMOUS #/AREA URNS LPF: ABNORMAL /[LPF]
UROBILINOGEN UR STRIP-ACNC: NORMAL
WBC #/AREA URNS AUTO: ABNORMAL /HPF

## 2019-07-17 ENCOUNTER — HISTORICAL (OUTPATIENT)
Dept: RADIOLOGY | Facility: HOSPITAL | Age: 74
End: 2019-07-17

## 2019-07-17 LAB — FINAL CULTURE: NO GROWTH

## 2019-07-31 ENCOUNTER — HISTORICAL (OUTPATIENT)
Dept: ADMINISTRATIVE | Facility: HOSPITAL | Age: 74
End: 2019-07-31

## 2019-08-12 ENCOUNTER — HISTORICAL (OUTPATIENT)
Dept: ADMINISTRATIVE | Facility: HOSPITAL | Age: 74
End: 2019-08-12

## 2019-08-14 LAB — FINAL CULTURE: NORMAL

## 2019-09-04 ENCOUNTER — HISTORICAL (OUTPATIENT)
Dept: ADMINISTRATIVE | Facility: HOSPITAL | Age: 74
End: 2019-09-04

## 2019-09-06 LAB — FINAL CULTURE: NORMAL

## 2019-09-16 ENCOUNTER — HISTORICAL (OUTPATIENT)
Dept: LAB | Facility: HOSPITAL | Age: 74
End: 2019-09-16

## 2019-09-19 LAB — FINAL CULTURE: NORMAL

## 2020-03-10 ENCOUNTER — HISTORICAL (OUTPATIENT)
Dept: LAB | Facility: HOSPITAL | Age: 75
End: 2020-03-10

## 2020-03-10 LAB
ABS NEUT (OLG): 3.99 X10(3)/MCL (ref 2.1–9.2)
ALBUMIN SERPL-MCNC: 3.6 GM/DL (ref 3.4–5)
ALBUMIN/GLOB SERPL: 0.9 RATIO (ref 1–2)
ALP SERPL-CCNC: 130 UNIT/L (ref 45–117)
ALT SERPL-CCNC: 23 UNIT/L (ref 13–56)
AST SERPL-CCNC: 16 UNIT/L (ref 15–37)
BILIRUB SERPL-MCNC: 0.2 MG/DL (ref 0.2–1)
BILIRUBIN DIRECT+TOT PNL SERPL-MCNC: <0.1 MG/DL (ref 0–0.2)
BILIRUBIN DIRECT+TOT PNL SERPL-MCNC: >0.1 MG/DL (ref 0–1)
BUN SERPL-MCNC: 9 MG/DL (ref 7–18)
CALCIUM SERPL-MCNC: 10.5 MG/DL (ref 8.5–10.1)
CHLORIDE SERPL-SCNC: 104 MMOL/L (ref 98–107)
CHOLEST SERPL-MCNC: 167 MG/DL (ref 0–199)
CHOLEST/HDLC SERPL: 3 {RATIO}
CO2 SERPL-SCNC: 31 MMOL/L (ref 21–32)
CREAT SERPL-MCNC: 0.57 MG/DL (ref 0.55–1.02)
ERYTHROCYTE [DISTWIDTH] IN BLOOD BY AUTOMATED COUNT: 14 % (ref 11.5–17)
GLOBULIN SER-MCNC: 4 GM/DL (ref 2–4)
GLUCOSE SERPL-MCNC: 90 MG/DL (ref 74–106)
HCT VFR BLD AUTO: 38.3 % (ref 37–47)
HDLC SERPL-MCNC: 63 MG/DL
HGB BLD-MCNC: 12.2 GM/DL (ref 12–16)
LDLC SERPL CALC-MCNC: 71 MG/DL (ref 0–129)
MCH RBC QN AUTO: 29.8 PG (ref 27–31)
MCHC RBC AUTO-ENTMCNC: 31.9 GM/DL (ref 33–36)
MCV RBC AUTO: 93.4 FL (ref 80–94)
NRBC BLD AUTO-RTO: 0 % (ref 0–0.2)
PLATELET # BLD AUTO: 346 X10(3)/MCL (ref 130–400)
PMV BLD AUTO: 10.5 FL (ref 7.4–10.4)
POTASSIUM SERPL-SCNC: 4.1 MMOL/L (ref 3.5–5.1)
PROT SERPL-MCNC: 7.2 GM/DL (ref 6.4–8.2)
RBC # BLD AUTO: 4.1 X10(6)/MCL (ref 4.2–5.4)
SODIUM SERPL-SCNC: 141 MMOL/L (ref 136–145)
TRIGL SERPL-MCNC: 167 MG/DL (ref 0–149)
TSH SERPL-ACNC: 2 MIU/ML (ref 0.36–3.74)
VLDLC SERPL CALC-MCNC: 33 MG/DL
WBC # SPEC AUTO: 6.5 X10(3)/MCL (ref 4.5–11.5)

## 2020-06-16 ENCOUNTER — HISTORICAL (OUTPATIENT)
Dept: ADMINISTRATIVE | Facility: HOSPITAL | Age: 75
End: 2020-06-16

## 2020-06-18 ENCOUNTER — HISTORICAL (OUTPATIENT)
Dept: ADMINISTRATIVE | Facility: HOSPITAL | Age: 75
End: 2020-06-18

## 2020-06-19 LAB — FINAL CULTURE: NORMAL

## 2020-06-20 LAB — FINAL CULTURE: NORMAL

## 2020-08-24 ENCOUNTER — HISTORICAL (OUTPATIENT)
Dept: ADMINISTRATIVE | Facility: HOSPITAL | Age: 75
End: 2020-08-24

## 2021-01-12 ENCOUNTER — HISTORICAL (OUTPATIENT)
Dept: ADMINISTRATIVE | Facility: HOSPITAL | Age: 76
End: 2021-01-12

## 2021-01-14 LAB — FINAL CULTURE: NORMAL

## 2021-03-25 ENCOUNTER — HISTORICAL (OUTPATIENT)
Dept: RADIOLOGY | Facility: HOSPITAL | Age: 76
End: 2021-03-25

## 2022-04-10 ENCOUNTER — HISTORICAL (OUTPATIENT)
Dept: ADMINISTRATIVE | Facility: HOSPITAL | Age: 77
End: 2022-04-10
Payer: MEDICARE

## 2022-04-27 VITALS
BODY MASS INDEX: 31.04 KG/M2 | DIASTOLIC BLOOD PRESSURE: 78 MMHG | HEIGHT: 67 IN | WEIGHT: 197.75 LBS | SYSTOLIC BLOOD PRESSURE: 132 MMHG

## 2022-05-04 NOTE — HISTORICAL [facility] CERNER
This is a historical note converted from [employer]. Formatting and pictures may have been removed.  Please reference [employer] for original formatting and attached multimedia. Chief Complaint  Annual exam  History of Present Illness  73yo  here today for WWE. She reports some right breast itching over the last several weeks. Denies breast lumps, nipple retraction or discharge. She?does report that over the last several years she has noticed increased sensitivity in her skin with soaps and lotions. She has?had increased itching on her upper extrimities. She also reports recurrent UTIs since menopause that has been worked up by urology. She describes having UDS and cystoscopy in the past, but she has not seen urology since her insurance changed at the beginning of the year. No PMB.  ?  Last MM, per patient  Last Colonoscopy: , f/u in   Dexa Scan:  Osteopenia on vitamin D/calcium supplementation  ?  GynHx: denies STIs. Denies h/o abnormal paps prior to 3/18 which showed ASCUS. Likely secondary to atrophic vaginal tissue.  triad:14/IRR  menopause: ~58yo  OBHx: 2 term ? 1 SAB  PMHx: CAD, Osteoarthritis, nephrolithiasis  FamHx: Sister with breast cancer in her 60s  All: Bactrim, lortab  Social: reports feeling safe at?home. lives with her  but is not sexually active. She denies tobacco, alcohol, or h/o IVDU.  ?  Review of Systems  Constitutional:?no fever, fatigue, weakness  Respiratory:?no cough, no wheezing, no shortness of breath  Cardiovascular:?no chest pain, no palpitations, no edema  Gastrointestinal:?no nausea, vomiting, or diarrhea. No abdominal pain  Genitourinary:?no dysuria, no urinary frequency or urgency, no hematuria  Musculoskeletal:?no muscle or joint pain, no joint swelling  Neurologic: no headache, no dizziness, no weakness or numbness  ?  Physical Exam  Vitals & Measurements  T:?36.6? ?C (Oral)? HR:?78(Peripheral)? RR:?20? BP:?156/94?  HT:?169?cm? WT:?89.7?kg?  BMI:?31.41?  GEN: The patient was alert and oriented x3, pleasant well-appearing female in no acute distress.  CV: RRR, no murmurs  RESP: Clear to auscultation bilaterally  BREAST: Symmetric breasts with no palpable breast masses or obvious breast lesions. She has no retractions or nipple discharge. She has no axillary abnormalities or palpable masses. No excoriations.  ABD: Soft, nontender, nondistended, normoactive bowel sounds  EXT: nontender, no edema  BACK: No CVA tenderness, no tenderness to palpation along spine  PELVIC: Normal appearing external female genitalia, atrophic vaginal epithelium, no abnormal discharge.?Slight?cystocele visible on exam. Bladder neck tender to palpation. Woody urethra noted. Normal appearing cervix. Bimanual: No CMT, 7week uterus, nontender. No palpable adnexal masses.  Rectal exam: no masses palpated  ?  Assessment/Plan  1.?Atrophic vaginitis?N95.2  ?Premarin cream prescribed. Discussed with patient to use every other day for two weeks then 1-2x/week.  Ordered:  conjugated estrogens topical, 1 gm =, VAG, qPM, Apply every other night for two weeks. Then 1-2 times per week thereafter, # 42.5 gm, 3 Refill(s), Pharmacy: [facility]  ?  2.?Recurrent UTI?N39.0  Repeat U/A and C&S today. Premarin cream prescribed to help with recurrent UTIs  Referral placed for urology.  Ordered:  conjugated estrogens topical, 1 gm =, VAG, qPM, Apply every other night for two weeks. Then 1-2 times per week thereafter, # 42.5 gm, 3 Refill(s), Pharmacy: [facility]  Clinic Follow up, *Est. 07/27/20 9:00:00 CDT, Order for future visit, Breast cancer screening, [facility]  Internal Referral to Urology, Recurrent UTIs, cystocele, *Est. 07/15/19 3:00:00 CDT, Future Visit?, Recurrent UTI  Urine Culture [id number], Routine collect, 07/15/19 9:13:00 CDT, Urine, Clean Catch, Nurse collect, Stop date 07/15/19 9:13:00 CDT, Recurrent UTI  ?  3.?Urethritis?N34.2  ?Given persistent UTIs, and  woody/tender urethra, will treat with Azithromycin for urethritis.  ?  4.?Breast cancer screening?Z12.39  ?MMG ordered.  Ordered:  Clinic Follow up, *Est. 07/27/20 9:00:00 CDT, Order for future visit, Breast cancer screening, [facility]  MG Screening, Routine, 07/15/19 8:29:00 CDT, Family History, None, Ambulatory, Rad Type, Order for future visit, Breast cancer screening, Schedule this test, [facility], Follow Breast Imaging Order Set Protocol, 07/15/19 8:29:00 CDT  ?  Orders:  azithromycin, 500 mg = 1 tab(s), Oral, Daily, X 5 day(s), # 5 tab(s), 0 Refill(s), Pharmacy: [facility]  RTC in 1year or PRN  Discussed with Dr. Carrasco.  ?  Mamta Young MD  [facility]  ?  Referrals  Internal Referral to Urology, Recurrent UTIs, cystocele, *Est. 07/15/19 3:00:00 CDT, Future Visit?, Recurrent UTI  Clinic Follow up, *Est. 07/27/20 9:00:00 CDT, Order for future visit, Breast cancer screening, [facility]   Problem List/Past Medical History  Ongoing  CAD - Coronary artery disease  Headache  Localized osteoarthritis of right knee  Nausea  Obesity  Osteoarthritis of left knee  Pre-op exam  Visual impairment  Historical  Pregnant  Pregnant  Pregnant  Procedure/Surgical History  forehead cyst removal  kidney stone removal  Repair of knee joint  Tonsillectomy  Tubal ligation   Medications  Aspir 81 oral delayed release tablet, 81 mg= 1 tab(s), Oral, Daily,? ?Not taking  ATORVASTATIN 20 MG TABLET, 20 mg= 1 tab(s), Oral, Daily  Azithromycin 3 Day Dose Pack 500 mg oral tablet, 500 mg= 1 tab(s), Oral, Daily  gabapentin 300 mg oral capsule, 300 mg= 1 cap(s), Oral, Once a day (at bedtime), 5 refills  Macrobid 100 mg oral capsule, 100 mg= 1 cap(s), Oral, BID  Oystercal-D, 1 tab(s), Oral, Daily  PANTOPRAZOLE SOD DR 40 MG TAB, 40 mg= 1 tab(s), Oral, Daily  Premarin 0.625 mg/g vaginal cream with applicator, 1 gm, VAG, qPM, 3 refills  Vitamin C 500 mg oral tablet, 500 mg= 1 tab(s),  Oral, Daily  Vitamin D3 1000 intl units oral capsule, 1000 IntUnit= 1 cap(s), Oral, Daily  Zyrtec 10 mg oral tablet, 10 mg= 1 tab(s), Oral, Daily  Allergies  Bactrim  Lortab  Social History  Abuse/Neglect  No, 06/28/2019  Alcohol - Denies Alcohol Use, 06/22/2015  Never, 11/14/2016  Substance Use  Never, 11/14/2016  Tobacco - Denies Tobacco Use, 06/22/2015  Never (less than 100 in lifetime), N/A, 07/15/2019  Family History  Dementia: Father.  Lab Results  Test Name Test Result Date/Time Comments   UA Appear Clear 07/15/2019 08:45 CDT Result created by Discern Rule.   UA Color YELLOW 07/15/2019 08:45 CDT    UA Spec Grav 1.014 07/15/2019 08:45 CDT    UA Bili Negative 07/15/2019 08:45 CDT Result created by Discern Rule.   UA pH 5.0 07/15/2019 08:45 CDT    UA Urobilinogen Normal 07/15/2019 08:45 CDT Result created by Discern Rule.   UA Blood Negative 07/15/2019 08:45 CDT Result created by Discern Rule.   UA Glucose Normal 07/15/2019 08:45 CDT Result created by Discern Rule.   UA Ketones Negative 07/15/2019 08:45 CDT Result created by Discern Rule.   UA Protein Negative 07/15/2019 08:45 CDT Result created by Discern Rule.   UA Nitrite Negative 07/15/2019 08:45 CDT Result created by Discern Rule.   UA Leuk Est Negative 07/15/2019 08:45 CDT Result created by Discern Rule.   UA WBC Interp 0-2 07/15/2019 08:45 CDT Result created by Discern Rule.   UA RBC Interp 0-2 07/15/2019 08:45 CDT Result created by Discern Rule.   UA Bact Interp None Seen 07/15/2019 08:45 CDT    UA Squam Epi Interp  (Abnormal) 07/15/2019 08:45 CDT Result created by Discern Rule.   UA CA Ox Crystal Few (Abnormal) 07/15/2019 08:45 CDT    UA Hyal Cast Interp 3-5 (Abnormal) 07/15/2019 08:45 CDT Result created by Discern Rule.       I saw and evaluated the patient. Discussed with resident and agree with resident?s findings and plan as documented in the resident?s note  ?  ?

## 2022-05-04 NOTE — HISTORICAL [facility] CERNER
This is a historical note converted from [employer]. Formatting and pictures may have been removed.  Please reference [employer] for original formatting and attached multimedia. Chief Complaint  PT IS HERE FOR BILATERAL KNEE PAIN. PT STATED THE RIGHT KNEE IS WORSE. SHE STATED HER KNEE  CATCHES AT TIMES.  History of Present Illness  Right and left_ KNEE  ?  Knee joint pain, Worse with weightbearing  Slowly worsens with extended activity  Pain is increased by bending it and by twisting  complains of knee joint swelling and stiffness  Catching and grating during movement  Minimal relief with prior?corticosteroid injections  Fixed valgus deformities  Review of Systems  Systemic: No fever, no chills, and no recent weight change.  Head: No headache - frequent.  Eyes: No vision problems.  Otolarnygeal: No hearing loss, no earache, no epistaxis, no hoarseness, and no tooth pain. Gums normal.  Cardiovascular: No chest pain or discomfort and no palpitations.  Pulmonary: No pulmonary symptoms - difficulty sleeping, no dyspnea, and cough not worse in the morning.  Gastrointestinal: Appetite not decreased. No dysphagia and no constant eructation. No nausea, no vomiting, no abdominal pain, no hematochezia, and no loose/mushy stools - frequent. No constipation - frequent.  Genitourinary: No genitourinary symptoms - Getting up every night to urinate and no increase in urinary frequency. No urinary hesitancy. No urinary loss of control - difficulty stopping urination and no burning sensation during urination.  Musculoskeletal: No calf muscle cramps and no localized soft tissue swelling of the ankle.  Neurological: No fainting and no convulsions.  Psychological: Not feeling nervous tension, not feeling nervous from exhaustion, and no depression.  Skin: No rash. Previous history of no ulcers.  Physical Exam  Vitals & Measurements  BP:?128/60?  HT:?167?cm? WT:?89.81?kg? BMI:?32.2?  PHYSICAL FINDINGS  Right knee exam:  Cardiovascular:  Arterial  Pulses: Posterior tibialis pulses were normal right. Dorsalis pedis pulses were normal right.  Musculoskeletal System:  Thigh:  Right Thigh: Thigh showed quadriceps atrophy.  Knee:  Right Knee: Examined.  Knee  Grade 1 effusion  Genu valgum. Patella demonstrated crepitus. Anterolateral aspect was tender on palpation. Lateral aspect was tender on palpation. Iliotibial Band was tender on palpation. Active motion.  Right Knee:  Right Knee Motion: Value  Active flexion _120 degrees  Active extension _5 degrees  Pain was elicited by flexion. No erythema. No warmth. No medial instability. No lateral instability. No one plane medial (straight) instability. No one plane lateral (straight) instability. A Lachman test did not demonstrate one plane anterior instability.  Neurological:  Gait And Stance: A right-sided antalgic gait was observed.  ?  ?  TESTS  Imaging:  X-Ray Knee:  A complete knee x-ray with standing views was performed -of right knee.  AP and lateral view x-rays of the right knee with sunrise view of the patella were performed -of right knee.  ?  ?  IMPRESSIONS RADIOLOGY TEST  Narrowing of the right knee joint space bone on bone, of the right knee joint space, and osteophytes arising from the right knee.? Valgus deformity  Tricompartment osteophytes  ?   Left knee exam:  PHYSICAL FINDINGS  Cardiovascular:  Arterial Pulses: Posterior tibialis pulses were normal left. Dorsalis pedis pulses were normal left.  Musculoskeletal System:  Thigh:  Left Thigh: Thigh showed quadriceps atrophy.  Knee:  Left Knee: Examined.  Knee  Grade 1 effusion  Genu valgum. Patella demonstrated crepitus. Anterolateral aspect was tender on palpation. Lateral aspect was tender on palpation. Iliotibial Band was tender on palpation. Active motion.  Left Knee:  Left Knee Motion: Value  Active flexion _120 degrees  Active extension 5_ degrees  Pain was elicited by flexion. No erythema. No warmth. No medial instability. No lateral instability.  No one plane medial (straight) instability. No one plane lateral (straight) instability. A Lachman test did not demonstrate one plane anterior instability.  Neurological:  Gait And Stance: A left-sided antalgic gait was observed.  ?  ?   TESTS  Imaging:  X-Ray Knee:  A complete knee x-ray with standing views was performed -of left knee.  AP and lateral view x-rays of the left knee with sunrise view of the patella were performed -of left knee.  ?  ?   IMPRESSIONS RADIOLOGY TEST  Narrowing of the left knee joint space bone on bone, of the left knee joint space, and osteophytes arising from the left knee.? Fixed valgus deformity and tricompartmental osteophytes.  ?  Assessment/Plan  1.?CAD - Coronary artery disease  Ordered:  Office/Outpatient Visit Level 4 Established [id number] PC, CAD - Coronary artery disease  Localized osteoarthritis of right knee  Obesity  Osteoarthritis of left knee, [facility], 04/15/19 10:56:00 CDT  ?  2.?Localized osteoarthritis of right knee  ? Discussed robotic assisted?total knee?arthroplasty?of either her right or left knees depending on which one hurts the worst. ?Patient will call when she is ready to schedule.  Ordered:  Office/Outpatient Visit Level 4 Established [id number] PC, CAD - Coronary artery disease  Localized osteoarthritis of right knee  Obesity  Osteoarthritis of left knee, [facility], 04/15/19 10:56:00 CDT  ?  3.?Obesity  Ordered:  Office/Outpatient Visit Level 4 Established [id number] PC, CAD - Coronary artery disease  Localized osteoarthritis of right knee  Obesity  Osteoarthritis of left knee, [facility], 04/15/19 10:56:00 CDT  ?  4.?Osteoarthritis of left knee  Ordered:  Office/Outpatient Visit Level 4 Established [id number] PC, CAD - Coronary artery disease  Localized osteoarthritis of right knee  Obesity  Osteoarthritis of left knee, [facility], 04/15/19 10:56:00 CDT  ?  Orders:  Clinic Follow-up PRN, 04/15/19 10:56:00 CDT, Future  Order, [facility]  XR Knee Left 4 or More Views, Routine, 04/15/19 10:34:00 CDT, Pain, None, Ambulatory, Rad Type, Bilateral knee pain, Not Scheduled, 04/15/19 10:34:00 CDT  XR Knee Right 4 or More Views, Routine, 04/15/19 10:34:00 CDT, Pain, None, Ambulatory, Rad Type, Bilateral knee pain, Not Scheduled, 04/15/19 10:34:00 CDT   Problem List/Past Medical History  Ongoing  CAD - Coronary artery disease  Headache  Localized osteoarthritis of right knee  Nausea  Obesity  Osteoarthritis of left knee  Visual impairment  Historical  No qualifying data  Procedure/Surgical History  Repair of knee joint  Tonsillectomy  Tubal ligation   Medications  AMOX/K CLAV -125, 1 tab(s), Oral, BID,? ?Not taking  Aspir 81 oral delayed release tablet, 81 mg= 1 tab(s), Oral, Daily  ATORVASTATIN 20 MG TABLET, 20 mg= 1 tab(s), Oral, Daily  diclofenac potassium 50 mg oral tablet, 50 mg= 1 tab(s), Oral, TID, PRN,? ?Not taking  gabapentin 300 mg oral capsule, 300 mg= 1 cap(s), Oral, Once a day (at bedtime), 5 refills  Keflex 500 mg oral capsule, 500 mg= 1 cap(s), Oral, q12hr  meloxicam 7.5 mg oral tablet, 7.5 mg= 1 tab(s), Oral, Daily,? ?Not taking: DUP  meloxicam 7.5 mg oral tablet, 7.5 mg= 1 tab(s), Oral, Daily  montelukast 10 mg oral TABLET, 10 mg= 1 tab(s), Oral, Daily  Ocuvite oral tablet, 1 tab(s), Oral, Daily  Oystercal-D, 1 tab(s), Oral, Daily  PANTOPRAZOLE SOD DR 40 MG TAB, 40 mg= 1 tab(s), Oral, Daily  Phenergan 12.5 mg Tab, 12.5 mg= 1 tab(s), Oral, TID, PRN, 1 refills  PREDNISONE TAB 20MG, 20 mg= 1 tab(s), Oral, BID  raloxifene 60 mg oral tablet, 60 mg= 1 tab(s), Oral, Daily  rosuvastatin 5 mg oral tablet, 5 mg= 1 tab(s), Oral, Daily  TRAMADOL HCL 50 MG TABLET,? ?Not taking  Vitamin C 500 mg oral tablet, 500 mg= 1 tab(s), Oral, Daily  Vitamin D3 1000 intl units oral capsule, 1000 IntUnit= 1 cap(s), Oral, Daily  Zyrtec 10 mg oral tablet, 10 mg= 1 tab(s), Oral, Daily  Allergies  Bactrim  Lortab  Social History  Alcohol -  Denies Alcohol Use, 06/22/2015  Never, 11/14/2016  Substance Abuse  Never, 11/14/2016  Tobacco - Denies Tobacco Use, 06/22/2015  Never smoker, 11/14/2016  Family History  Family history is unknown  Health Maintenance  Health Maintenance  ???Pending?(in the next year)  ??? ??OverDue  ??? ? ? ?Coronary Artery Disease Maintenance-Antiplatelet Agent Prescribed due??and every?  ??? ? ? ?Coronary Artery Disease Maintenance-Lipid Lowering Therapy due??and every?  ??? ? ? ?Coronary Artery Disease Maintenance-Electrocardiogram due??06/23/16??and every 1??year(s)  ??? ? ? ?Aspirin Therapy for CVD Prevention due??11/14/17??and every 1??year(s)  ??? ? ? ?Coronary Artery Disease Maintenance-BMP due??05/18/18??and every 1??year(s)  ??? ??Due?  ??? ? ? ?ADL Screening due??04/15/19??and every 1??year(s)  ??? ? ? ?Alcohol Misuse Screening due??04/15/19??and every 1??year(s)  ??? ? ? ?Cognitive Screening due??04/15/19??and every 1??year(s)  ??? ? ? ?Colorectal Screening ([facility]) due??04/15/19??and every?  ??? ? ? ?Functional Assessment due??04/15/19??and every 1??year(s)  ??? ? ? ?Geriatric Depression Screening due??04/15/19??and every 1??year(s)  ??? ? ? ?Pneumococcal Vaccine due??04/15/19??Variable frequency  ??? ? ? ?Pneumococcal Vaccine due??04/15/19??and every?  ??? ? ? ?Smoking Cessation due??04/15/19??Variable frequency  ??? ? ? ?Tetanus Vaccine due??04/15/19??and every 10??year(s)  ??? ? ? ?Zoster Vaccine due??04/15/19??and every 100??year(s)  ??? ??Due In Future?  ??? ? ? ?Fall Risk Assessment not due until??06/07/19??and every 1??year(s)  ??? ? ? ?Advance Directive not due until??08/24/19??and every 1??year(s)  ???Satisfied?(in the past 1 year)  ??? ??Satisfied?  ??? ? ? ?Advance Directive on??08/24/18.??Satisfied by Suzie Mariscal LPN  ??? ? ? ?Blood Pressure Screening on??04/15/19.??Satisfied by Suzie Mariscal LPN  ??? ? ? ?Body Mass Index Check on??04/15/19.??Satisfied by Suzie Mariscal LPN  ??? ? ? ?Bone  Density Screening on??04/23/18.??Satisfied by Esme Juarez  ??? ? ? ?Breast Cancer Screening ([facility]) on??06/08/18.??Satisfied by Theunissen RT, Angela  ??? ? ? ?Depression Screening on??09/06/18.??Satisfied by Hilary Singh MA  ??? ? ? ?Fall Risk Assessment on??06/07/18.??Satisfied by Suzie Mariscal LPN  ??? ? ? ?Obesity Screening on??04/15/19.??Satisfied by Suzie Mariscal LPN  ?  ?

## 2022-05-04 NOTE — HISTORICAL [facility]
This is a historical note converted from Jagdeep. Formatting and pictures may have been removed.  Please reference Jagdeep for original formatting and attached multimedia. Chief Complaint  2W POSTOP L TKA 7/18/19  History of Present Illness  2 weeks s/p left TKA  doing well, pain controlled. ?Mild swelling?left lower extremity  Ambulating with a walker and attending physical therapy  Review of Systems  Systemic: No fever, no chills, and no recent weight change.  Head: No headache - frequent.  Eyes: No vision problems.  Otolarnygeal: No hearing loss, no earache, no epistaxis, no hoarseness, and no tooth pain. Gums normal.  Cardiovascular: No chest pain or discomfort and no palpitations.  Pulmonary: No pulmonary symptoms - no dyspnea, no shortness of breath  Gastrointestinal: Appetite not decreased. No dysphagia and no constant eructation. No nausea, no vomiting, no abdominal pain, no hematochezia.  Genitourinary: No genitourinary symptoms - No urinary hesitancy. No urinary loss of control - no burning sensation during urination.  Musculoskeletal: No calf muscle cramps and no localized soft tissue swelling  Neurological: No fainting and no convulsions.  Psychological: no depression.  Skin: No rash.  Physical Exam  Vitals & Measurements  HR:?78(Peripheral)? BP:?136/78?  HT:?169?cm? WT:?89.7?kg? BMI:?31.41?  Musculoskeletal System:  left Knee:  General/bilateral: ? Swelling of the knee. ? Knee demonstrated muscle weakness. ? No warmth of the knee. ? No pain was elicited by motion of the knee. ? No instability of the knee  left?Knee:  Knee Motion: Value  Active flexion?90 degrees  Active extension 0 degrees  ???  Neurological:  Motor (Strength): ? Weakness of the?left knee was observed.  Skin:  ? No cellulitis.  Wound healing normal. staples C/D/I.  ???  TESTS  Imaging:  X-Ray Knee:  AP and lateral view x-rays of the left?knee with sunrise view of the patella were performed  ???  IMPRESSIONS RADIOLOGY TEST  X-ray of knee  was performed intact?left knee implant.  Assessment/Plan  Status post total left knee replacement?Z96.652  ?Staples removed today, Steri-Strips applied.? She will transition from a walker to a cane?as comfortable?and continue with physical therapy. ?We will see her back in 3 months time for repeat evaluation  Ordered:  Clinic Follow up, *Est. 10/30/19 13:00:00 CDT, Order for future visit, Status post total left knee replacement, [facility]  Post-Op follow-up visit [id number] PC, Status post total left knee replacement, [facility], 07/31/19 13:13:00 CDT  PT/OT External Referral, 07/31/19 13:13:00 CDT, Status post total left knee replacement, Anit Grav Hip Abductor & Extensor Exc.  Isotonic hamstring & Closed Chain Quad  Stretch and Strengthen  No Dry Needling  Therapeutic Excercise, 3 X Week, ****KNEE PT ORDERS****  ?  Referrals  Clinic Follow up, *Est. 10/30/19 13:00:00 CDT, Order for future visit, Status post total left knee replacement, [facility]  PT/OT External Referral, 07/31/19 13:13:00 CDT, Status post total left knee replacement, Anit Grav Hip Abductor & Extensor Exc.  Isotonic hamstring & Closed Chain Quad  Stretch and Strengthen  No Dry Needling  Therapeutic Excercise, 3 X Week, ****KNEE PT ORDERS****   Problem List/Past Medical History  Ongoing  CAD - Coronary artery disease  Headache  Localized osteoarthritis of right knee  Nausea  Obesity  Osteoarthritis of left knee  Pre-op exam  Visual impairment  Historical  Pregnant  Pregnant  Pregnant  Procedure/Surgical History  JARETH JONES Total Knee Arthroplasty (Left) (07/18/2019)  Replacement of Left Knee Joint with Synthetic Substitute, Uncemented, Open Approach (07/18/2019)  Robotic Assisted Procedure of Lower Extremity, Open Approach (07/18/2019)  forehead cyst removal  kidney stone removal  Repair of knee joint  Tonsillectomy  Tubal ligation   Medications  aspirin 81 mg oral Delayed Release (EC) tablet, 81 mg= 1 tab(s), Oral,  BID  aspirin 81 mg oral Delayed Release (EC) tablet, 81 mg= 1 tab(s), Oral, Daily  ATORVASTATIN 20 MG TABLET, 20 mg= 1 tab(s), Oral, Daily  Colace 100 mg oral capsule, 200 mg= 2 cap(s), Oral, Daily  gabapentin 300 mg oral capsule, 300 mg= 1 cap(s), Oral, Once a day (at bedtime), 5 refills,? ?Still taking, not as prescribed: prn  Oystercal-D, 1 tab(s), Oral, Daily  PANTOPRAZOLE SOD DR 40 MG TAB, 40 mg= 1 tab(s), Oral, Daily  Premarin 0.625 mg/g vaginal cream with applicator, 1 gm, VAG, qPM, 3 refills,? ?Not taking  Robaxin 750 mg oral tablet, 750 mg= 1 tab(s), Oral, TID, PRN  Ultram 50 mg oral tablet, 50 mg= 1 tab(s), Oral, q4hr, PRN  Vitamin C 500 mg oral tablet, 500 mg= 1 tab(s), Oral, Daily  Vitamin D3 1000 intl units oral capsule, 1000 IntUnit= 1 cap(s), Oral, Daily  Zyrtec 10 mg oral tablet, 10 mg= 1 tab(s), Oral, Daily  Allergies  Bactrim  Lortab  Social History  Abuse/Neglect  No, Yes, Yes, 07/31/2019  No, 06/28/2019  Alcohol - Denies Alcohol Use, 06/22/2015  Never, 11/14/2016  Substance Use  Never, 11/14/2016  Tobacco - Denies Tobacco Use, 06/22/2015  Never (less than 100 in lifetime), N/A, 07/31/2019  Never (less than 100 in lifetime), N/A, 07/15/2019  Family History  Dementia: Father.  Health Maintenance  Health Maintenance  ???Pending?(in the next year)  ??? ??OverDue  ??? ? ? ?Coronary Artery Disease Maintenance-Antiplatelet Agent Prescribed due??and every?  ??? ? ? ?Coronary Artery Disease Maintenance-Lipid Lowering Therapy due??and every?  ??? ? ? ?Pneumococcal Vaccine due??and every?  ??? ? ? ?Advance Directive due??01/01/19??and every 1??year(s)  ??? ? ? ?Alcohol Misuse Screening due??01/01/19??and every 1??year(s)  ??? ? ? ?Cognitive Screening due??01/01/19??and every 1??year(s)  ??? ? ? ?Geriatric Depression Screening due??01/01/19??and every 1??year(s)  ??? ??Due?  ??? ? ? ?ADL Screening due??07/31/19??and every 1??year(s)  ??? ? ? ?Colorectal Screening (Senior Wellness) due??07/31/19??and  every?  ??? ? ? ?Pneumococcal Vaccine due??07/31/19??Variable frequency  ??? ? ? ?Tetanus Vaccine due??07/31/19??and every 10??year(s)  ??? ? ? ?Zoster Vaccine due??07/31/19??and every 100??year(s)  ??? ??Due In Future?  ??? ? ? ?Fall Risk Assessment not due until??01/01/20??and every 1??year(s)  ??? ? ? ?Functional Assessment not due until??01/01/20??and every 1??year(s)  ??? ? ? ?Obesity Screening not due until??01/01/20??and every 1??year(s)  ??? ? ? ?Bone Density Screening not due until??04/23/20??and every 2??year(s)  ??? ? ? ?Coronary Artery Disease Maintenance-BMP not due until??07/20/20??and every 1??year(s)  ??? ? ? ?Coronary Artery Disease Maintenance-Electrocardiogram not due until??07/22/20??and every 1??year(s)  ???Satisfied?(in the past 1 year)  ??? ??Satisfied?  ??? ? ? ?Advance Directive on??08/24/18.??Satisfied by Suzie Mariscal LPN  ??? ? ? ?Aspirin Therapy for CVD Prevention on??07/20/19.??Satisfied by Beba Cancino RN  ??? ? ? ?Blood Pressure Screening on??07/31/19.??Satisfied by Suzie Mariscal LPN  ??? ? ? ?Body Mass Index Check on??07/31/19.??Satisfied by Suzie Mariscal LPN  ??? ? ? ?Breast Cancer Screening ([facility]) on??07/17/19.??Satisfied by Glo Qiu  ??? ? ? ?Coronary Artery Disease Maintenance-Electrocardiogram on??07/22/19.  ??? ? ? ?Depression Screening on??09/06/18.??Satisfied by Hilary Singh MA  ??? ? ? ?Diabetes Screening on??07/20/19.??Satisfied by Rita Warner  ??? ? ? ?Fall Risk Assessment on??07/20/19.??Satisfied by Beba Cancino RN  ??? ? ? ?Functional Assessment on??06/28/19.??Satisfied by Suzie Mariscal LPN  ??? ? ? ?Lipid Screening on??05/20/19.??Satisfied by Bobby Tejada  ??? ? ? ?Obesity Screening on??07/31/19.??Satisfied by Suzie Mariscal LPN  ?

## 2022-05-04 NOTE — HISTORICAL [facility]
This is a historical note converted from Jagdeep. Formatting and pictures may have been removed.  Please reference Jagdeep for original formatting and attached multimedia. Chief Complaint  PATIENT HERE FOR RIGHT KNEE PAIN. SHE WAS SEEING DR PIZANO  History of Present Illness  Knee symptoms ::knee gives way ??? Knee joint pain on the right ??? Worse with weightbearing ??? Worse in the morning  ??? Slowly worsens with extended activity ??? Is increased by bending it ??? By twisting it ??? By kneeling ??? With stairs ??? By squatting  ??? Knee joint swelling on the right??  ?? Knee joint pain not improved by rest ? ??? No clicking sensation in the right knee ??? No grating sensation in the right knee?  ??? The knee did not suddenly buckle due to contact ?? No popping sound was heard in the knee?  ??? No tingling ??? No burning sensation  Review of Systems  Review of Systems  ????Constitutional: No fever, No chills.  ????Respiratory: No shortness of breath, No cough.  ????Cardiovascular: No chest pain.  ????Gastrointestinal: No nausea, No vomiting, No diarrhea, No constipation, No heartburn.  ????Genitourinary: No dysuria, No hematuria.  ????Hematology/Lymphatics: No bleeding tendency.  ????Endocrine: No polyuria.  ????Neurologic: Alert and oriented X4, No numbness, No tingling.  ????Psychiatric: No anxiety, No depression.  Physical Exam  Vitals & Measurements  BP:?132/72?  HT:?167?cm? HT:?167?cm? WT:?84.82?kg? WT:?84.82?kg? BMI:?30.41?  PHYSICAL FINDINGS  Cardiovascular:  Arterial Pulses: ? Dorsalis pedis pulses were normal right. ? Dorsalis pedis pulses were normal right.  Musculoskeletal System:  Hips:  General/bilateral: ? No swelling of the hips. ? No induration of the hips.  Right Hip: ? Motion was normal. ? No pain was elicited by active internal rotation with the hip flexed.  ? No pain was elicited by active external rotation with the hip flexed. ? No pain was elicited by active motion. ? No pain was elicited by  passive motion.  Left Hip: ? Motion was normal.  Right Knee: ? Examined. ? Positive effusion. ?Localized swelling. ?Genu valgum. ?Patella demonstrated crepitus. ?Anteromedial aspect was tender on palpation.? Lateral?aspect was tender on palpation.  patella  Right Knee:  Knee Motion: Value Normal Range  Active flexion? 105 degrees  Active extension?? 10 degrees  ???  ? Pain was elicited throughout the range of motion. ? Swelling with a negative fluctuation test. ? No erythema. ? No warmth. ? Anterior-lateral aspect was? tender on palpation. ? Patellofemoral region was not tender on palpation. ? Medial collateral ligament was not tender on palpation. ? Lateral collateral ligament was not tender on palpation. ? No pain was elicited by motion using Alonzo apprehension test. ? No laxity of the posterior cruciate ligament. ? No anterior drawer sign was present. ? No one plane medial (straight) instability. ? No one plane lateral (straight) instability. ? A Lachman test did not demonstrate one plane anterior instability. ? Clarkes sign was? observed for chondromalacia.  Left Knee:  Knee Motion: Value Normal Range  Active flexion?120 degrees  Active extension? 05?degrees  ???  ???  TESTS  Imaging:  X-Ray Knee:  AP and lateral view x-rays of the right knee with sunrise view of the patella were performed -of right knee.  ???  IMPRESSIONS RADIOLOGY TEST  Narrowing of the right knee joint space bone on bone lateral compartment, showed sclerosis of the right knee, and osteophytes arising from the right knee  ?  ?   right knee injection  Administered corticosteroid injection?? ? 2cc cortisone and 2cc lidocaine using sterile technique after informed verbal consent. Risks discussed with patient prior to injection. Informed the patient of the following:  -?Explained to patient that this injection in some patients will experience increased pain a few minutes after the injection and that the pain will last anywhere from 10 to 20  minutes. In order to decrease the pain you need to do the following:  o?Make sure to move the extremity in which the injection was given. If you do not move the medication sits there and can cause more pain.  o?Ice the affected joint every 2 hours for 20 minutes at a time for the next 24 hours.  o?If you are not already taking an anti-inflammatory take the following Ibuprofen/ Advil take 3 to 4 tablets every 6 to 8 hours or 2 Aleve every 12 hours for the next 5 days to help the medication work. If you cannot take anti-inflammatory take 2 extra strength Tylenol every 6 hours for the next 5 days.  ??Patient voiced understanding ????????  ?   will?get approval for Synvisc if still has pain in a month????????????????????????????????????????????????????????????????  Assessment/Plan  1.?Localized osteoarthritis of right knee  Right knee pain   Problem List/Past Medical History  Ongoing  CAD - Coronary artery disease  Headache  Localized osteoarthritis of right knee  Nausea  Obesity  Visual impairment  Historical  No qualifying data  Procedure/Surgical History  Repair of knee joint, Tonsillectomy, Tubal ligation.  Medications  AMOX/K CLAV -125, 1 tab(s), Oral, BID,? ?Not taking  Aspir 81 oral delayed release tablet, 81 mg= 1 tab(s), Oral, Daily  ATORVASTATIN 20 MG TABLET, 20 mg= 1 tab(s), Oral, Daily  diclofenac potassium 50 mg oral tablet, 50 mg= 1 tab(s), Oral, TID, PRN,? ?Not taking  gabapentin 300 mg oral capsule, 300 mg= 1 cap(s), Oral, Once a day (at bedtime), 5 refills,? ?Not Taking per Prescriber  montelukast 10 mg oral TABLET, 10 mg= 1 tab(s), Oral, Daily  Ocuvite oral tablet, 1 tab(s), Oral, Daily  Oystercal-D, 1 tab(s), Oral, Daily  PANTOPRAZOLE SOD DR 40 MG TAB, 40 mg= 1 tab(s), Oral, Daily  Phenergan 12.5 mg Tab, 12.5 mg= 1 tab(s), Oral, TID, PRN, 1 refills,? ?Not taking  PREDNISONE TAB 20MG, 20 mg= 1 tab(s), Oral, BID,? ?Not taking  raloxifene 60 mg oral tablet, 60 mg= 1 tab(s), Oral,  Daily  rosuvastatin 5 mg oral tablet, 5 mg= 1 tab(s), Oral, Daily  Vitamin C 500 mg oral tablet, 500 mg= 1 tab(s), Oral, Daily  Vitamin D3 1000 intl units oral capsule, 1000 IntUnit= 1 cap(s), Oral, Daily  Zyrtec 10 mg oral tablet, 10 mg= 1 tab(s), Oral, Daily  Allergies  Bactrim  Lortab  Social History  Alcohol - Denies Alcohol Use, 06/22/2015  Never, 11/14/2016  Substance Abuse  Never, 11/14/2016  Tobacco - Denies Tobacco Use, 06/22/2015  Never smoker, 11/14/2016  Family History  Family history is unknown

## 2022-05-04 NOTE — HISTORICAL [facility]
This is a historical note converted from Jagdeep. Formatting and pictures may have been removed.  Please reference Jagdeep for original formatting and attached multimedia. Chief Complaint  9 month f/u L TKA 7/18/19. Pt c/o right knee pain.  History of Present Illness  Jeana comes in for a one-year follow-up on her left total knee. ?She is doing great with no complaints. ?She does have a history of right knee osteoarthritis and complaining of intermittent right knee pain.  Review of Systems  Systemic: No fever, no chills, and no recent weight change.  Head: No headache - frequent.  Eyes: No vision problems.  Otolarnygeal: No hearing loss, no earache, no epistaxis, no hoarseness, and no tooth pain. Gums normal.  Cardiovascular: No chest pain or discomfort and no palpitations.  Pulmonary: No pulmonary symptoms - no dyspnea, no shortness of breath  Gastrointestinal: Appetite not decreased. No dysphagia and no constant eructation. No nausea, no vomiting, no abdominal pain, no hematochezia.  Genitourinary: No genitourinary symptoms - No urinary hesitancy. No urinary loss of control - no burning sensation during urination.  Musculoskeletal: No calf muscle cramps and no localized soft tissue swelling  Neurological: No fainting and no convulsions.  Psychological: no depression.  Skin: No rash.  Physical Exam  Vitals & Measurements  T:?36.2? ?C (Oral)? HR:?80(Peripheral)? BP:?132/78?  HT:?169.00?cm? WT:?89.700?kg? BMI:?31.41?  Cardiovascular:  Arterial Pulses: ? Dorsalis pedis pulses were normal.  Musculoskeletal System:  left Knee:  General: ? No swelling of the knee. ? No warmth of the knee. ? No pain was elicited by motion of the knee. ? No instability of the knee. ? Knees demonstrated no muscle weakness.  left ?Knee: ? Motion was normal.  Active flexion 120 degrees  Active extension 0 degrees  Neurological:  Sensation: ? Monofilament wire test of the leg/foot was normal.  Motor (Strength): ? No weakness of the?left knee was  observed.  Skin:  ? No cellulitis. Surgical incision well healed ?  Tests  Imaging:  X-Ray Knee:  A complete knee x-ray with standing views was performed -of?left knee.  Impressions Radiology Test  X-ray of knee was performed intact?left knee implant.  Assessment/Plan  1.?Status post left knee replacement?Z96.652  ?Continue with low impact exercising follow-up with us as needed  Ordered:  Office/Outpatient Visit Level 3 Established [id number] PC, Status post left knee replacement  Localized osteoarthritis of right knee, [facility], 08/24/20 15:05:00 CDT  ?  2.?Localized osteoarthritis of right knee?M17.11  ?Discussed surgical treatment consisting of total knee arthroplasty?versus conservative treatment consisting of corticosteroid injections. ?At this point her pain is not that severe so she will hold off on any type of injection. ?She will let us know?if she would like to?further pursue surgery or injections in the near future.  Ordered:  Office/Outpatient Visit Level 3 Established [id number] , Status post left knee replacement  Localized osteoarthritis of right knee, [facility], 08/24/20 15:05:00 CDT  ?  Orders:  Clinic Follow-up PRN, 08/24/20 15:05:00 CDT, Future Order, Orthopaedics  Referrals  Clinic Follow-up PRN, 08/24/20 15:05:00 CDT, Future Order, Orthopaedics   Problem List/Past Medical History  Ongoing  CAD - Coronary artery disease  Headache  Nausea  Obesity  Osteoarthritis of left knee  Pre-op exam  Visual impairment  Historical  Pregnant  Pregnant  Pregnant  Procedure/Surgical History  JARETH JONES Total Knee Arthroplasty (Left) (07/18/2019)  Replacement of Left Knee Joint with Synthetic Substitute, Uncemented, Open Approach (07/18/2019)  Robotic Assisted Procedure of Lower Extremity, Open Approach (07/18/2019)  forehead cyst removal  kidney stone removal  Repair of knee joint  Tonsillectomy  Tubal ligation   Medications  aspirin 81 mg oral Delayed Release (EC) tablet, 81 mg= 1 tab(s),  Oral, BID  aspirin 81 mg oral Delayed Release (EC) tablet, 81 mg= 1 tab(s), Oral, Daily  ATORVASTATIN 20 MG TABLET, 20 mg= 1 tab(s), Oral, Daily  Colace 100 mg oral capsule, 200 mg= 2 cap(s), Oral, Daily  gabapentin 300 mg oral capsule, 300 mg= 1 cap(s), Oral, Once a day (at bedtime), 5 refills,? ?Still taking, not as prescribed: prn  Oystercal-D, 1 tab(s), Oral, Daily  PANTOPRAZOLE SOD DR 40 MG TAB, 40 mg= 1 tab(s), Oral, Daily  Premarin 0.625 mg/g vaginal cream with applicator, 1 gm, VAG, qPM, 3 refills,? ?Not taking  Vitamin C 500 mg oral tablet, 500 mg= 1 tab(s), Oral, Daily  Vitamin D3 1000 intl units oral capsule, 1000 IntUnit= 1 cap(s), Oral, Daily  Zyrtec 10 mg oral tablet, 10 mg= 1 tab(s), Oral, Daily  Allergies  Bactrim  Lortab  Social History  Abuse/Neglect  No, Yes, Yes, 08/24/2020  No, 06/28/2019  Alcohol - Denies Alcohol Use, 06/22/2015  Never, 11/14/2016  Substance Use  Never, 11/14/2016  Tobacco - Denies Tobacco Use, 06/22/2015  Never (less than 100 in lifetime), N/A, 08/24/2020  Never (less than 100 in lifetime), N/A, 07/15/2019  Family History  Dementia: Father.  Health Maintenance  Health Maintenance  ???Pending?(in the next year)  ??? ??OverDue  ??? ? ? ?Coronary Artery Disease Maintenance-Antiplatelet Agent Prescribed due??and every?  ??? ? ? ?Advance Directive due??01/02/20??and every 1??year(s)  ??? ? ? ?Cognitive Screening due??01/02/20??and every 1??year(s)  ??? ? ? ?Coronary Artery Disease Maintenance-Electrocardiogram due??07/22/20??and every 1??year(s)  ??? ??Due?  ??? ? ? ?ADL Screening due??08/24/20??and every 1??year(s)  ??? ? ? ?Colorectal Screening due??08/24/20??and every?  ??? ? ? ?Influenza Vaccine due??08/24/20??and every?  ??? ? ? ?Medicare Annual Wellness Exam due??08/24/20??and every 1??year(s)  ??? ? ? ?Pneumococcal Vaccine due??08/24/20??and every?  ??? ? ? ?Tetanus Vaccine due??08/24/20??and every 10??year(s)  ??? ? ? ?Zoster Vaccine due??08/24/20??and every?  ??? ??Due In  Future?  ??? ? ? ?Aspirin Therapy for CVD Prevention not due until??09/01/20??and every 1??year(s)  ??? ? ? ?Obesity Screening not due until??01/01/21??and every 1??year(s)  ??? ? ? ?Alcohol Misuse Screening not due until??01/02/21??and every 1??year(s)  ??? ? ? ?Fall Risk Assessment not due until??01/02/21??and every 1??year(s)  ??? ? ? ?Functional Assessment not due until??01/02/21??and every 1??year(s)  ??? ? ? ?Hypertension Management-BMP not due until??03/10/21??and every 1??year(s)  ??? ? ? ?Coronary Artery Disease Maintenance-BMP not due until??03/10/21??and every 1??year(s)  ???Satisfied?(in the past 1 year)  ??? ??Satisfied?  ??? ? ? ?Alcohol Misuse Screening on??08/24/20.??Satisfied by Suzie Mariscal LPN  ??? ? ? ?Aspirin Therapy for CVD Prevention on??09/01/19.??Satisfied by Giselle Benitez NP  ??? ? ? ?Blood Pressure Screening on??08/24/20.??Satisfied by Suzie Mariscal LPN  ??? ? ? ?Body Mass Index Check on??08/24/20.??Satisfied by Suzie Mariscal LPN  ??? ? ? ?Bone Density Screening on??07/22/20.??Satisfied by Ashlee Lama  ??? ? ? ?Breast Cancer Screening on??07/22/20.??Satisfied by Ashlee Lama  ??? ? ? ?Coronary Artery Disease Maintenance-BMP on??03/10/20.??Satisfied by Denisse Magana  ??? ? ? ?Depression Screening on??08/24/20.??Satisfied by Suzie Mariscal LPN  ??? ? ? ?Diabetes Screening on??03/10/20.??Satisfied by Denisse Magana  ??? ? ? ?Fall Risk Assessment on??08/24/20.??Satisfied by Suzie Mariscal LPN  ??? ? ? ?Functional Assessment on??08/24/20.??Satisfied by Suzie Mariscal LPN  ??? ? ? ?Hypertension Management-Blood Pressure on??08/24/20.??Satisfied by Suzie Mariscal LPN  ??? ? ? ?Lipid Screening on??03/10/20.??Satisfied by Denisse Magana  ??? ? ? ?Obesity Screening on??08/24/20.??Satisfied by Suzie Mariscal LPN  ?

## 2022-05-09 ENCOUNTER — TELEPHONE (OUTPATIENT)
Dept: INFECTIOUS DISEASES | Facility: CLINIC | Age: 77
End: 2022-05-09
Payer: MEDICARE

## 2022-05-09 NOTE — TELEPHONE ENCOUNTER
----- Message from Mariangel Ta sent at 5/9/2022  2:04 PM CDT -----  Regarding: [facility] ID: Thyroid  Patient called about a thyroid test. She stated that Tana ordered it.   Please advise.   Patient # [phone]

## 2022-05-25 DIAGNOSIS — E21.3 HPTH (HYPERPARATHYROIDISM): Primary | ICD-10-CM

## 2022-06-01 ENCOUNTER — HOSPITAL ENCOUNTER (OUTPATIENT)
Dept: RADIOLOGY | Facility: HOSPITAL | Age: 77
Discharge: HOME | End: 2022-06-01
Attending: NURSE PRACTITIONER
Payer: COMMERCIAL

## 2022-06-01 DIAGNOSIS — E21.3 HYPERPARATHYROIDISM: Primary | ICD-10-CM

## 2022-06-01 DIAGNOSIS — E21.3 HPTH (HYPERPARATHYROIDISM): ICD-10-CM

## 2022-06-01 PROCEDURE — 76536 US EXAM OF HEAD AND NECK: CPT | Mod: TC

## 2022-07-06 ENCOUNTER — TELEPHONE (OUTPATIENT)
Dept: ENDOCRINOLOGY | Facility: CLINIC | Age: 77
End: 2022-07-06
Payer: COMMERCIAL

## 2022-07-06 DIAGNOSIS — E21.3 HYPERPARATHYROIDISM: Primary | ICD-10-CM

## 2022-07-06 RX ORDER — METHYLPREDNISOLONE 4 MG/1
TABLET ORAL
Status: ON HOLD | COMMUNITY
Start: 2022-07-01 | End: 2024-03-08 | Stop reason: HOSPADM

## 2022-07-06 RX ORDER — TOBRAMYCIN 3 MG/ML
SOLUTION/ DROPS OPHTHALMIC
COMMUNITY
Start: 2022-02-07

## 2022-07-06 RX ORDER — BUSPIRONE HYDROCHLORIDE 7.5 MG/1
7.5 TABLET ORAL
COMMUNITY
Start: 2022-02-13

## 2022-07-06 RX ORDER — DICYCLOMINE HYDROCHLORIDE 20 MG/1
20 TABLET ORAL
COMMUNITY
Start: 2022-06-02

## 2022-07-06 RX ORDER — PREDNISOLONE ACETATE 10 MG/ML
SUSPENSION/ DROPS OPHTHALMIC
COMMUNITY
Start: 2022-05-04

## 2022-07-06 RX ORDER — KETOROLAC TROMETHAMINE 5 MG/ML
SOLUTION OPHTHALMIC
COMMUNITY
Start: 2022-02-04

## 2022-07-06 RX ORDER — ATORVASTATIN CALCIUM 10 MG/1
5 TABLET, FILM COATED ORAL NIGHTLY
COMMUNITY
Start: 2022-05-04

## 2022-07-06 RX ORDER — PANTOPRAZOLE SODIUM 40 MG/1
40 TABLET, DELAYED RELEASE ORAL DAILY PRN
COMMUNITY
Start: 2022-04-26

## 2022-07-06 NOTE — TELEPHONE ENCOUNTER
----- Message from Mariangel Ta sent at 7/5/2022  2:02 PM CDT -----  Regarding: [facility]: Order  [facility] PT       Pt called stating that she needs a new order because she had to cancel the scan last week.   Please call back @ [phone]

## 2022-07-15 ENCOUNTER — HOSPITAL ENCOUNTER (OUTPATIENT)
Dept: RADIOLOGY | Facility: HOSPITAL | Age: 77
Discharge: HOME | End: 2022-07-15
Attending: NURSE PRACTITIONER
Payer: COMMERCIAL

## 2022-07-15 DIAGNOSIS — E21.3 HYPERPARATHYROIDISM: ICD-10-CM

## 2022-07-15 PROCEDURE — 78070 PARATHYROID PLANAR IMAGING: CPT | Mod: TC

## 2022-07-25 ENCOUNTER — OFFICE VISIT (OUTPATIENT)
Dept: ENDOCRINOLOGY | Facility: CLINIC | Age: 77
End: 2022-07-25
Payer: COMMERCIAL

## 2022-07-25 VITALS
WEIGHT: 190.81 LBS | HEIGHT: 67 IN | TEMPERATURE: 98 F | DIASTOLIC BLOOD PRESSURE: 80 MMHG | HEART RATE: 79 BPM | BODY MASS INDEX: 29.95 KG/M2 | SYSTOLIC BLOOD PRESSURE: 123 MMHG | RESPIRATION RATE: 18 BRPM

## 2022-07-25 DIAGNOSIS — E21.3 HYPERPARATHYROIDISM: Primary | ICD-10-CM

## 2022-07-25 DIAGNOSIS — E55.9 VITAMIN D DEFICIENCY: ICD-10-CM

## 2022-07-25 DIAGNOSIS — E83.52 HYPERCALCEMIA: ICD-10-CM

## 2022-07-25 PROCEDURE — 1159F MED LIST DOCD IN RCRD: CPT | Mod: CPTII,,, | Performed by: NURSE PRACTITIONER

## 2022-07-25 PROCEDURE — 3079F DIAST BP 80-89 MM HG: CPT | Mod: CPTII,,, | Performed by: NURSE PRACTITIONER

## 2022-07-25 PROCEDURE — 99215 OFFICE O/P EST HI 40 MIN: CPT | Mod: PBBFAC | Performed by: NURSE PRACTITIONER

## 2022-07-25 PROCEDURE — 1126F PR PAIN SEVERITY QUANTIFIED, NO PAIN PRESENT: ICD-10-PCS | Mod: CPTII,,, | Performed by: NURSE PRACTITIONER

## 2022-07-25 PROCEDURE — 1160F PR REVIEW ALL MEDS BY PRESCRIBER/CLIN PHARMACIST DOCUMENTED: ICD-10-PCS | Mod: CPTII,,, | Performed by: NURSE PRACTITIONER

## 2022-07-25 PROCEDURE — 3074F SYST BP LT 130 MM HG: CPT | Mod: CPTII,,, | Performed by: NURSE PRACTITIONER

## 2022-07-25 PROCEDURE — 1101F PT FALLS ASSESS-DOCD LE1/YR: CPT | Mod: CPTII,,, | Performed by: NURSE PRACTITIONER

## 2022-07-25 PROCEDURE — 3288F FALL RISK ASSESSMENT DOCD: CPT | Mod: CPTII,,, | Performed by: NURSE PRACTITIONER

## 2022-07-25 PROCEDURE — 3288F PR FALLS RISK ASSESSMENT DOCUMENTED: ICD-10-PCS | Mod: CPTII,,, | Performed by: NURSE PRACTITIONER

## 2022-07-25 PROCEDURE — 99214 OFFICE O/P EST MOD 30 MIN: CPT | Mod: S$PBB,,, | Performed by: NURSE PRACTITIONER

## 2022-07-25 PROCEDURE — 3074F PR MOST RECENT SYSTOLIC BLOOD PRESSURE < 130 MM HG: ICD-10-PCS | Mod: CPTII,,, | Performed by: NURSE PRACTITIONER

## 2022-07-25 PROCEDURE — 1160F RVW MEDS BY RX/DR IN RCRD: CPT | Mod: CPTII,,, | Performed by: NURSE PRACTITIONER

## 2022-07-25 PROCEDURE — 3079F PR MOST RECENT DIASTOLIC BLOOD PRESSURE 80-89 MM HG: ICD-10-PCS | Mod: CPTII,,, | Performed by: NURSE PRACTITIONER

## 2022-07-25 PROCEDURE — 1159F PR MEDICATION LIST DOCUMENTED IN MEDICAL RECORD: ICD-10-PCS | Mod: CPTII,,, | Performed by: NURSE PRACTITIONER

## 2022-07-25 PROCEDURE — 99214 PR OFFICE/OUTPT VISIT, EST, LEVL IV, 30-39 MIN: ICD-10-PCS | Mod: S$PBB,,, | Performed by: NURSE PRACTITIONER

## 2022-07-25 PROCEDURE — 1126F AMNT PAIN NOTED NONE PRSNT: CPT | Mod: CPTII,,, | Performed by: NURSE PRACTITIONER

## 2022-07-25 PROCEDURE — 1101F PR PT FALLS ASSESS DOC 0-1 FALLS W/OUT INJ PAST YR: ICD-10-PCS | Mod: CPTII,,, | Performed by: NURSE PRACTITIONER

## 2022-07-25 RX ORDER — PREDNISONE 10 MG/1
1 TABLET ORAL DAILY
COMMUNITY
Start: 2022-07-12

## 2022-07-25 NOTE — PROGRESS NOTES
Subjective:       Patient ID: Jeana Muhammad is a 77 y.o. female.    Chief Complaint: Hyperparathyroidism    [facility] 3/21/2022 endocrine clinic note:    76-year-old female scheduled today as new patient referral to endocrine clinic history of hyperparathyroidism. history of hypercalcemia, hyperparathyroidism and osteopenia. Patient's most recent labs 10/5/2021 calcium level 10.7, corrected calcium 10.4, albumin level 4.4, PTH 96.8 on 10/05/2021.  Patient started with hypercalcemia 11/22/2019 previously patient had low calcium levels and normal calcium levels.  Patient reports she was previously on calcium replacement which was stopped by her primary care provider she is unsure when may have been a couple months ago.  Patient denies a previous history of a kidney stone which was over 10 years ago.  She denies any recent fractures.  Patient had a bone density 7/22/2020 IMPRESSION:    Normal mineralization is noted within the spine, not significantly changed.  Osteopenia is noted in the right hip, not significantly changed. Osteopenia is noted within the left hip with mild worsening in bone density.  Patient denies any palpable nodules to the neck she denies any muscle cramps or dystonia.    Current Endocrine Clinic Note:  07/25/2022  76-year-old female scheduled today for endocrine clinic follow-up.  History of hyperparathyroidism with hypercalcemia vitamin-D deficiency.  Workup for hyperparathyroidism again.  Initially Calcium 10.7 albumin 4.4., PTH 96.8 on 10/05/2021 Patient's vitamin-D was increased repeat labs 03/24/2022 PTH decreased to 82, calcium 10.1.  In vitamin-D 35. Since patient was increased to vitamin D3 2000 IU per day patient was to be taking 1 tablet per day but states she takes 3 per day equaling 6000 IU per day.  Instructed patient will repeat a vitamin-D level today ensure that her vitamin-D level is not too high.  Patient had ultrasound of the thyroid 06/01/2022 no parathyroid adenoma was seen.   Patient had NM parathyroid of on 07/15/2022 parathyroid adenoma was seen.  Patient denies any palpable masses.  She denies any muscle cramps or dystonia.  Patient denies any fractures or kidney stones no previous visit.          Narrative & Impression  EXAMINATION:  US THYROID     CLINICAL HISTORY:  , Hyperparathyroidism, unspecified.     TECHNIQUE:  Multiple transverse and sagittal grayscale sonographic images were acquired through the thyroid gland.     FINDINGS:  Grayscale imaging color flow Doppler images are available for interpretation.     Examination reveals right hemithyroid to measured 3.8 x 1.9 x 1.4 cm, left ginger thyroid measured 3.5 x 8 mm x 1.2 cm isthmus measures 2 mm in thickness.     There is slight irregularity of the posterior contour of the thyroid on the right with a small isoechoic nodule that measures 1.3 x 1.1 x 1.1 cm these might be related to the parathyroid other imaging modalities might prove helpful for further assessment.     Small subcentimeter nodule identified in the left ginger thyroid measuring 5 mm x 5 mm by 6 mm     Impression:     Slight irregularity of the posterior aspect of the right ginger thyroid with a isoechoic nodule that measures 1.3 x 1.1 x 1.1 cm these might be related to the parathyroid other imaging modalities might prove helpful for further evaluation.     Subcentimeter nodule in the left ginger thyroid does not meet criteria for biopsy        Electronically signed by: Sid Dorantes  Date:                                            06/01/2022  Time:                                           14:20          Exam Ended: 06/01/22 14:12 Last Resulted: 06/01/22 14:20        Narrative & Impression  EXAMINATION:  NM PARATHYROID SCAN PLANAR     CLINICAL HISTORY:  Hyperparathyroidism, unspecified;     TECHNIQUE:  Intravenous administration of 24.2 mCi Tc-99m labeled sestamibi was performed uneventfully.  Planar and SPECT scintigraphic images of the neck were subsequently  obtained immediately and 2 hours after radiotracer injection.     COMPARISON:  None available at the time of initial interpretation.     FINDINGS:  There is normal physiologic distribution of radiotracer within the salivary and thyroid glands on the initial imaging.     No focus of abnormal radiotracer activity is appreciated on the initial images.  There are no persistent or otherwise suspicious areas of uptake on the delayed acquisitions.  No unexpected regional radiotracer uptake is identified to raise suspicion of possible ectopic parathyroid or thyroid tissue.        Impression:        No scintigraphic evidence of abnormal parathyroid activity.        Electronically signed by: Delano Ferrer  Date:                                            07/15/2022  Time:                                           12:19          Exam Ended: 07/15/22 12:03 Last Resulted: 07/15/22 12:19            Review of Systems   Constitutional: Negative for activity change, appetite change and fatigue.   HENT: Negative for dental problem, hearing loss, tinnitus, trouble swallowing and goiter.    Eyes: Negative for photophobia, pain and visual disturbance.   Respiratory: Negative for cough, chest tightness and wheezing.    Cardiovascular: Negative for chest pain, palpitations and leg swelling.   Gastrointestinal: Negative for abdominal pain, constipation, diarrhea, nausea and reflux.   Endocrine: Negative for cold intolerance, heat intolerance, polydipsia and polyphagia.   Genitourinary: Negative for difficulty urinating, flank pain, hematuria, hot flashes, menstrual irregularity, menstrual problem, nocturia and urgency.   Musculoskeletal: Negative for back pain, gait problem, joint swelling, leg pain and joint deformity.   Integumentary:  Negative for color change, pallor, rash and breast discharge.   Allergic/Immunologic: Negative for environmental allergies, food allergies and immunocompromised state.   Neurological: Negative for tremors,  seizures, headaches, disturbances in coordination, memory loss and coordination difficulties.   Psychiatric/Behavioral: Negative for agitation, behavioral problems and sleep disturbance. The patient is not nervous/anxious.          Objective:      Physical Exam  Constitutional:       General: She is not in acute distress.     Appearance: Normal appearance. She is not ill-appearing.   HENT:      Head: Normocephalic and atraumatic.      Right Ear: External ear normal.      Left Ear: External ear normal.      Nose: Nose normal. No congestion or rhinorrhea.      Mouth/Throat:      Mouth: Mucous membranes are moist.      Pharynx: Oropharynx is clear. No oropharyngeal exudate.   Eyes:      General:         Right eye: No discharge.         Left eye: No discharge.      Conjunctiva/sclera: Conjunctivae normal.      Pupils: Pupils are equal, round, and reactive to light.   Neck:      Thyroid: No thyroid mass, thyromegaly or thyroid tenderness.   Cardiovascular:      Rate and Rhythm: Normal rate and regular rhythm.      Pulses: Normal pulses.      Heart sounds: Normal heart sounds. No murmur heard.  Pulmonary:      Effort: Pulmonary effort is normal. No respiratory distress.      Breath sounds: Normal breath sounds.   Abdominal:      General: Abdomen is flat. Bowel sounds are normal. There is no distension.      Palpations: Abdomen is soft.      Tenderness: There is no abdominal tenderness.   Musculoskeletal:         General: No swelling or tenderness. Normal range of motion.      Cervical back: Normal range of motion and neck supple. No tenderness.      Right lower leg: No edema.      Left lower leg: No edema.   Feet:      Right foot:      Skin integrity: Skin integrity normal.      Left foot:      Skin integrity: Skin integrity normal.   Lymphadenopathy:      Cervical: No cervical adenopathy.   Skin:     General: Skin is warm and dry.      Coloration: Skin is not jaundiced or pale.   Neurological:      General: No focal  deficit present.      Mental Status: She is alert and oriented to person, place, and time. Mental status is at baseline.      Coordination: Coordination normal.      Gait: Gait normal.   Psychiatric:         Mood and Affect: Mood normal.         Behavior: Behavior normal.         Thought Content: Thought content normal.         Assessment:       Problem List Items Addressed This Visit    None     Visit Diagnoses     Hyperparathyroidism    -  Primary    Relevant Orders    PTH, Intact    Vitamin D    Renal Function Panel    Hypercalcemia        Relevant Orders    PTH, Intact    Vitamin D    Renal Function Panel    Vitamin D deficiency        Relevant Orders    Vitamin D          Plan:       Hyperparathyroidism  Calcium 10.7 albumin 4.4., PTH 96.8 on 10/05/2021  Repeat levels on vitamin D 03/24/2022  PTH decreased to 82, calcium 10.1  Ultrasound of thyroid 06/01/2022 no adenoma seen  NM parathyroid 06/01/2022 no adenoma seen  Repeat PTH, renal panel vitamin-D level today  Return to clinic in 6 months  -     PTH, Intact; Future; Expected date: 07/25/2022  -     Vitamin D; Future; Expected date: 07/25/2022  -     Renal Function Panel; Future; Expected date: 07/25/2022    Hypercalcemia  Calcium 10.7 albumin 4.4., PTH 96.8 on 10/05/2021  Repeat levels on vitamin D 03/24/2022  PTH decreased to 82, calcium 10.1  Ultrasound of thyroid 06/01/2022 no adenoma seen  NM parathyroid 06/01/2022 no adenoma seen  -     PTH, Intact; Future; Expected date: 07/25/2022  -     Vitamin D; Future; Expected date: 07/25/2022  -     Renal Function Panel; Future; Expected date: 07/25/2022    Vitamin D deficiency  Vit D level today   On Vit D 3 2000 IU (pt takes 3 a day)   -     Vitamin D; Future; Expected date: 07/25/2022

## 2023-02-10 ENCOUNTER — LAB VISIT (OUTPATIENT)
Dept: LAB | Facility: HOSPITAL | Age: 78
End: 2023-02-10
Attending: FAMILY MEDICINE
Payer: MEDICARE

## 2023-02-10 DIAGNOSIS — L40.3 SAPHO SYNDROME: ICD-10-CM

## 2023-02-10 DIAGNOSIS — D72.829 LEUKOCYTOSIS, UNSPECIFIED TYPE: Primary | ICD-10-CM

## 2023-02-10 DIAGNOSIS — L70.9 SAPHO SYNDROME: ICD-10-CM

## 2023-02-10 DIAGNOSIS — M85.80 SAPHO SYNDROME: ICD-10-CM

## 2023-02-10 DIAGNOSIS — M65.9 SAPHO SYNDROME: ICD-10-CM

## 2023-02-10 DIAGNOSIS — M86.9 SAPHO SYNDROME: ICD-10-CM

## 2023-02-10 LAB
ALBUMIN SERPL-MCNC: 3.9 G/DL (ref 3.4–4.8)
ALBUMIN/GLOB SERPL: 1.3 RATIO (ref 1.1–2)
ALP SERPL-CCNC: 155 UNIT/L (ref 40–150)
ALT SERPL-CCNC: 16 UNIT/L (ref 0–55)
AST SERPL-CCNC: 17 UNIT/L (ref 5–34)
BASOPHILS # BLD AUTO: 0.06 X10(3)/MCL (ref 0–0.2)
BASOPHILS NFR BLD AUTO: 0.8 %
BILIRUBIN DIRECT+TOT PNL SERPL-MCNC: 0.4 MG/DL
BUN SERPL-MCNC: 9.5 MG/DL (ref 9.8–20.1)
CALCIUM SERPL-MCNC: 10.2 MG/DL (ref 8.4–10.2)
CHLORIDE SERPL-SCNC: 107 MMOL/L (ref 98–107)
CHOLEST SERPL-MCNC: 154 MG/DL
CHOLEST/HDLC SERPL: 4 {RATIO} (ref 0–5)
CO2 SERPL-SCNC: 25 MMOL/L (ref 23–31)
CREAT SERPL-MCNC: 0.65 MG/DL (ref 0.55–1.02)
DEPRECATED CALCIDIOL+CALCIFEROL SERPL-MC: 45 NG/ML (ref 30–80)
EOSINOPHIL # BLD AUTO: 0.32 X10(3)/MCL (ref 0–0.9)
EOSINOPHIL NFR BLD AUTO: 4.1 %
ERYTHROCYTE [DISTWIDTH] IN BLOOD BY AUTOMATED COUNT: 13.4 % (ref 11.5–17)
GFR SERPLBLD CREATININE-BSD FMLA CKD-EPI: >60 MLS/MIN/1.73/M2
GLOBULIN SER-MCNC: 3.1 GM/DL (ref 2.4–3.5)
GLUCOSE SERPL-MCNC: 93 MG/DL (ref 82–115)
HCT VFR BLD AUTO: 38.7 % (ref 37–47)
HDLC SERPL-MCNC: 42 MG/DL (ref 35–60)
HGB BLD-MCNC: 12.6 GM/DL (ref 12–16)
IMM GRANULOCYTES # BLD AUTO: 0.04 X10(3)/MCL (ref 0–0.04)
IMM GRANULOCYTES NFR BLD AUTO: 0.5 %
LDLC SERPL CALC-MCNC: 82 MG/DL (ref 50–140)
LYMPHOCYTES # BLD AUTO: 2.05 X10(3)/MCL (ref 0.6–4.6)
LYMPHOCYTES NFR BLD AUTO: 26.4 %
MCH RBC QN AUTO: 30.5 PG
MCHC RBC AUTO-ENTMCNC: 32.6 MG/DL (ref 33–36)
MCV RBC AUTO: 93.7 FL (ref 80–94)
MONOCYTES # BLD AUTO: 0.65 X10(3)/MCL (ref 0.1–1.3)
MONOCYTES NFR BLD AUTO: 8.4 %
NEUTROPHILS # BLD AUTO: 4.65 X10(3)/MCL (ref 2.1–9.2)
NEUTROPHILS NFR BLD AUTO: 59.8 %
NRBC BLD AUTO-RTO: 0 %
PLATELET # BLD AUTO: 343 X10(3)/MCL (ref 130–400)
PMV BLD AUTO: 10.9 FL (ref 7.4–10.4)
POTASSIUM SERPL-SCNC: 4.2 MMOL/L (ref 3.5–5.1)
PROT SERPL-MCNC: 7 GM/DL (ref 5.8–7.6)
RBC # BLD AUTO: 4.13 X10(6)/MCL (ref 4.2–5.4)
SODIUM SERPL-SCNC: 142 MMOL/L (ref 136–145)
TRIGL SERPL-MCNC: 149 MG/DL (ref 37–140)
TSH SERPL-ACNC: 1 UIU/ML (ref 0.35–4.94)
VIT B12 SERPL-MCNC: 507 PG/ML (ref 213–816)
VLDLC SERPL CALC-MCNC: 30 MG/DL
WBC # SPEC AUTO: 7.8 X10(3)/MCL (ref 4.5–11.5)

## 2023-02-10 PROCEDURE — 84443 ASSAY THYROID STIM HORMONE: CPT

## 2023-02-10 PROCEDURE — 80053 COMPREHEN METABOLIC PANEL: CPT

## 2023-02-10 PROCEDURE — 85025 COMPLETE CBC W/AUTO DIFF WBC: CPT

## 2023-02-10 PROCEDURE — 82607 VITAMIN B-12: CPT

## 2023-02-10 PROCEDURE — 82306 VITAMIN D 25 HYDROXY: CPT

## 2023-02-10 PROCEDURE — 36415 COLL VENOUS BLD VENIPUNCTURE: CPT

## 2023-02-10 PROCEDURE — 80061 LIPID PANEL: CPT

## 2023-02-20 ENCOUNTER — HOSPITAL ENCOUNTER (OUTPATIENT)
Dept: RADIOLOGY | Facility: CLINIC | Age: 78
Discharge: HOME | End: 2023-02-20
Attending: PHYSICIAN ASSISTANT
Payer: MEDICARE

## 2023-02-20 ENCOUNTER — OFFICE VISIT (OUTPATIENT)
Dept: ORTHOPEDICS | Facility: CLINIC | Age: 78
End: 2023-02-20
Payer: MEDICARE

## 2023-02-20 VITALS
HEART RATE: 75 BPM | DIASTOLIC BLOOD PRESSURE: 84 MMHG | WEIGHT: 198 LBS | SYSTOLIC BLOOD PRESSURE: 143 MMHG | BODY MASS INDEX: 31.08 KG/M2 | HEIGHT: 67 IN

## 2023-02-20 DIAGNOSIS — M25.561 RIGHT KNEE PAIN, UNSPECIFIED CHRONICITY: ICD-10-CM

## 2023-02-20 DIAGNOSIS — M17.11 PRIMARY OSTEOARTHRITIS OF RIGHT KNEE: Primary | ICD-10-CM

## 2023-02-20 PROCEDURE — 99202 PR OFFICE/OUTPT VISIT, NEW, LEVL II, 15-29 MIN: ICD-10-PCS | Mod: ,,, | Performed by: PHYSICIAN ASSISTANT

## 2023-02-20 PROCEDURE — 99202 OFFICE O/P NEW SF 15 MIN: CPT | Mod: ,,, | Performed by: PHYSICIAN ASSISTANT

## 2023-02-20 PROCEDURE — 1101F PT FALLS ASSESS-DOCD LE1/YR: CPT | Mod: CPTII,,, | Performed by: PHYSICIAN ASSISTANT

## 2023-02-20 PROCEDURE — 73564 XR KNEE COMP 4 OR MORE VIEWS RIGHT: ICD-10-PCS | Mod: RT,,, | Performed by: PHYSICIAN ASSISTANT

## 2023-02-20 PROCEDURE — 1101F PR PT FALLS ASSESS DOC 0-1 FALLS W/OUT INJ PAST YR: ICD-10-PCS | Mod: CPTII,,, | Performed by: PHYSICIAN ASSISTANT

## 2023-02-20 PROCEDURE — 1159F PR MEDICATION LIST DOCUMENTED IN MEDICAL RECORD: ICD-10-PCS | Mod: CPTII,,, | Performed by: PHYSICIAN ASSISTANT

## 2023-02-20 PROCEDURE — 3288F FALL RISK ASSESSMENT DOCD: CPT | Mod: CPTII,,, | Performed by: PHYSICIAN ASSISTANT

## 2023-02-20 PROCEDURE — 3077F PR MOST RECENT SYSTOLIC BLOOD PRESSURE >= 140 MM HG: ICD-10-PCS | Mod: CPTII,,, | Performed by: PHYSICIAN ASSISTANT

## 2023-02-20 PROCEDURE — 73564 X-RAY EXAM KNEE 4 OR MORE: CPT | Mod: RT,,, | Performed by: PHYSICIAN ASSISTANT

## 2023-02-20 PROCEDURE — 3288F PR FALLS RISK ASSESSMENT DOCUMENTED: ICD-10-PCS | Mod: CPTII,,, | Performed by: PHYSICIAN ASSISTANT

## 2023-02-20 PROCEDURE — 3079F PR MOST RECENT DIASTOLIC BLOOD PRESSURE 80-89 MM HG: ICD-10-PCS | Mod: CPTII,,, | Performed by: PHYSICIAN ASSISTANT

## 2023-02-20 PROCEDURE — 3077F SYST BP >= 140 MM HG: CPT | Mod: CPTII,,, | Performed by: PHYSICIAN ASSISTANT

## 2023-02-20 PROCEDURE — 3079F DIAST BP 80-89 MM HG: CPT | Mod: CPTII,,, | Performed by: PHYSICIAN ASSISTANT

## 2023-02-20 PROCEDURE — 1160F PR REVIEW ALL MEDS BY PRESCRIBER/CLIN PHARMACIST DOCUMENTED: ICD-10-PCS | Mod: CPTII,,, | Performed by: PHYSICIAN ASSISTANT

## 2023-02-20 PROCEDURE — 1159F MED LIST DOCD IN RCRD: CPT | Mod: CPTII,,, | Performed by: PHYSICIAN ASSISTANT

## 2023-02-20 PROCEDURE — 1160F RVW MEDS BY RX/DR IN RCRD: CPT | Mod: CPTII,,, | Performed by: PHYSICIAN ASSISTANT

## 2023-02-20 RX ORDER — HYDROXYZINE HYDROCHLORIDE 25 MG/1
25 TABLET, FILM COATED ORAL NIGHTLY PRN
COMMUNITY
Start: 2022-11-09

## 2023-02-20 RX ORDER — ASCORBIC ACID 500 MG
500 TABLET ORAL DAILY
COMMUNITY

## 2023-02-20 NOTE — PROGRESS NOTES
Chief Complaint:   Chief Complaint   Patient presents with    Knee Pain     Pt states she had her Lt knee replaced 3 years ago and due overcompensating her body weight in her Rt knee she have started hurting. Pain feels sharp at times, swells up very often and hurts by the end of the day.       History of present illness:    77-year-old female presents office today for evaluation for right knee pain.  Her pain has been present for nearly 3 years.  No associated injury.  Her pain is lateral-sided.  She has occasional swelling at the end of the day.  She does have a history of left knee replacement done 3 years ago and feels that she is been over compensating on the right side.    Past Medical History:   Diagnosis Date    Hyperlipemia     Hypertension        Past Surgical History:   Procedure Laterality Date    CATARACT EXTRACTION      KNEE SURGERY      TONSILLECTOMY      TUBAL LIGATION         Current Outpatient Medications   Medication Sig    ascorbic acid, vitamin C, (VITAMIN C) 500 MG tablet 1 tablet.    aspirin (ECOTRIN) 81 MG EC tablet Take 81 mg by mouth once daily.    atorvastatin (LIPITOR) 10 MG tablet Take 10 mg by mouth.    busPIRone (BUSPAR) 7.5 MG tablet Take 7.5 mg by mouth.    cetirizine (ZYRTEC) 5 MG tablet Take 5 mg by mouth once daily.    dicyclomine (BENTYL) 20 mg tablet Take 20 mg by mouth 3 (three) times daily.    fluticasone (FLONASE) 50 mcg/actuation nasal spray 2 sprays by Each Nare route 2 (two) times daily.    ketorolac 0.5% (ACULAR) 0.5 % Drop     methylPREDNISolone (MEDROL DOSEPACK) 4 mg tablet Take by mouth.    pantoprazole (PROTONIX) 40 MG tablet Take 40 mg by mouth.    prednisoLONE acetate (PRED FORTE) 1 % DrpS Place into the right eye.    predniSONE (DELTASONE) 10 MG tablet Take 1 tablet by mouth once daily.    rosuvastatin (CRESTOR) 5 MG tablet Take 5 mg by mouth once daily.    tobramycin sulfate 0.3% (TOBREX) 0.3 % ophthalmic solution     hydrOXYzine HCL (ATARAX) 25 MG tablet Take  25 mg by mouth nightly as needed.    lorazepam (ATIVAN) 1 MG tablet Take 0.5 tablets (0.5 mg total) by mouth every 8 (eight) hours as needed for Anxiety.     No current facility-administered medications for this visit.       Review of patient's allergies indicates:   Allergen Reactions    Bactrim [sulfamethoxazole-trimethoprim] Nausea And Vomiting    Ezetimibe Itching    Levofloxacin Other (See Comments)    Lortab [hydrocodone-acetaminophen] Itching    Tylenol-codeine #3 [acetaminophen-codeine] Other (See Comments)       Family History   Problem Relation Age of Onset    No Known Problems Mother     No Known Problems Father     No Known Problems Sister     No Known Problems Brother     No Known Problems Maternal Aunt     No Known Problems Maternal Uncle     No Known Problems Paternal Aunt     No Known Problems Paternal Uncle     No Known Problems Maternal Grandmother     No Known Problems Maternal Grandfather     No Known Problems Paternal Grandmother     No Known Problems Paternal Grandfather     No Known Problems Other     Anesthesia problems Neg Hx     Broken bones Neg Hx     Cancer Neg Hx     Clotting disorder Neg Hx     Collagen disease Neg Hx     Diabetes Neg Hx     Dislocations Neg Hx     Osteoporosis Neg Hx     Rheumatologic disease Neg Hx     Scoliosis Neg Hx     Severe sprains Neg Hx        Social History     Socioeconomic History    Marital status: Single   Tobacco Use    Smoking status: Never    Smokeless tobacco: Never   Substance and Sexual Activity    Alcohol use: Yes     Alcohol/week: 1.0 standard drink     Types: 1 Cans of beer per week     Comment: Occasionally    Drug use: Never    Sexual activity: Yes     Partners: Male         Review of Systems:    Constitution:   Denies chills, fever, and sweats.  HENT:   Denies headaches or blurry vision.  Cardiovascular:  Denies chest pain or irregular heart beat.  Respiratory:   Denies cough or shortness of breath.  Gastrointestinal:  Denies abdominal pain,  "nausea, or vomiting.  Musculoskeletal:   Denies muscle cramps.  Neurological:   Denies dizziness or focal weakness.  Psychiatric/Behavior: Normal mental status.  Hematology/Lymph:  Denies bleeding problem or easy bruising/bleeding.  Skin:    Denies rash or suspicious lesions.    Examination:    Vital Signs:    Vitals:    02/20/23 1428   BP: (!) 143/84   Pulse: 75   Weight: 89.8 kg (198 lb)   Height: 5' 7" (1.702 m)       Body mass index is 31.01 kg/m².    Constitution:   Well-developed, well nourished patient in no acute distress.  Neurological:   Alert and oriented x 3 and cooperative to examination.     Psychiatric/Behavior: Normal mental status.  Respiratory:   No shortness of breath.  Eyes:    Extraoccular muscles intact  Skin:    No scars, rash or suspicious lesions.    Physical Exam:     right Knee:     Grade 1 effusion    Valgus deformity    No erythema or increased heat Patella demonstrated crepitus.    Anterolateral aspect was tender on palpation. Lateral aspect was tender on palpation. Iliotibial Band was tender on palpation.    No medial joint line pain   Active flexion 120 degrees   Active extension 5 degrees   antalgic gait was observed.     X-Ray Knee: A complete knee x-ray with standing 4 views was performed of the right knee      IMPRESSIONS RADIOLOGY TEST   Narrowing of the joint space bone on bone in lateral compartment, and osteophytes arising from the knee.    Kellgren Lamin grade 4 changes          Assessment:     Right knee pain, unspecified chronicity  -     X-Ray Knee Complete 4 Or More Views Right; Future; Expected date: 02/20/2023    Primary osteoarthritis of right knee        Plan:      I have discussed exam and x-ray findings with the patient today.  She has advanced osteoarthritis of the lateral and patellofemoral compartments of the right knee.  Currently her symptoms are not that bad today.  We have discussed definitive treatment consisting of total knee arthroplasty as well as " conservative treatment consisting of injections, physical therapy versus home exercises.  We have discussed Synvisc-One injection to the right knee.  She would like to give this a try.  I will get this authorized and bring her back once authorized.  We will start some physical therapy.        No follow-ups on file.    DISCLAIMER: This note may have been dictated using voice recognition software and may contain grammatical errors.

## 2023-03-11 RX ORDER — CHOLECALCIFEROL (VITAMIN D3) 25 MCG
1000 TABLET ORAL DAILY
COMMUNITY

## 2023-03-13 ENCOUNTER — TELEPHONE (OUTPATIENT)
Dept: ORTHOPEDICS | Facility: CLINIC | Age: 78
End: 2023-03-13
Payer: MEDICARE

## 2023-03-13 NOTE — TELEPHONE ENCOUNTER
Per Ann, appt on 3/17/23 needs to be conservative treatment before insurance will  approve visco injection.    Pt is opting to wait a few weeks due to having a kidney procedure performed on 3/21/23.    Pt is now scheduled for a cortisone inj on 4/3/23 with [facility] at 2:45 p.m.    Pt verbalized understanding and will call with any questions or concerns.

## 2023-03-14 ENCOUNTER — LAB VISIT (OUTPATIENT)
Dept: LAB | Facility: HOSPITAL | Age: 78
End: 2023-03-14
Attending: UROLOGY
Payer: MEDICARE

## 2023-03-14 DIAGNOSIS — Z01.818 OTHER SPECIFIED PRE-OPERATIVE EXAMINATION: Primary | ICD-10-CM

## 2023-03-14 DIAGNOSIS — Z01.818 OTHER SPECIFIED PRE-OPERATIVE EXAMINATION: ICD-10-CM

## 2023-03-14 LAB
ALBUMIN SERPL-MCNC: 3.8 G/DL (ref 3.4–4.8)
ALBUMIN/GLOB SERPL: 1.4 RATIO (ref 1.1–2)
ALP SERPL-CCNC: 158 UNIT/L (ref 40–150)
ALT SERPL-CCNC: 12 UNIT/L (ref 0–55)
AST SERPL-CCNC: 13 UNIT/L (ref 5–34)
BASOPHILS # BLD AUTO: 0.05 X10(3)/MCL (ref 0–0.2)
BASOPHILS NFR BLD AUTO: 0.5 %
BILIRUBIN DIRECT+TOT PNL SERPL-MCNC: 0.3 MG/DL
BUN SERPL-MCNC: 9.8 MG/DL (ref 9.8–20.1)
CALCIUM SERPL-MCNC: 10.8 MG/DL (ref 8.4–10.2)
CHLORIDE SERPL-SCNC: 105 MMOL/L (ref 98–107)
CO2 SERPL-SCNC: 28 MMOL/L (ref 23–31)
CREAT SERPL-MCNC: 0.73 MG/DL (ref 0.55–1.02)
EOSINOPHIL # BLD AUTO: 0.21 X10(3)/MCL (ref 0–0.9)
EOSINOPHIL NFR BLD AUTO: 2.2 %
ERYTHROCYTE [DISTWIDTH] IN BLOOD BY AUTOMATED COUNT: 13.5 % (ref 11.5–17)
GFR SERPLBLD CREATININE-BSD FMLA CKD-EPI: >60 MLS/MIN/1.73/M2
GLOBULIN SER-MCNC: 2.8 GM/DL (ref 2.4–3.5)
GLUCOSE SERPL-MCNC: 110 MG/DL (ref 82–115)
HCT VFR BLD AUTO: 39.7 % (ref 37–47)
HGB BLD-MCNC: 12.4 G/DL (ref 12–16)
IMM GRANULOCYTES # BLD AUTO: 0.07 X10(3)/MCL (ref 0–0.04)
IMM GRANULOCYTES NFR BLD AUTO: 0.7 %
LYMPHOCYTES # BLD AUTO: 2.12 X10(3)/MCL (ref 0.6–4.6)
LYMPHOCYTES NFR BLD AUTO: 22.3 %
MCH RBC QN AUTO: 29.6 PG
MCHC RBC AUTO-ENTMCNC: 31.2 G/DL (ref 33–36)
MCV RBC AUTO: 94.7 FL (ref 80–94)
MONOCYTES # BLD AUTO: 0.66 X10(3)/MCL (ref 0.1–1.3)
MONOCYTES NFR BLD AUTO: 6.9 %
NEUTROPHILS # BLD AUTO: 6.39 X10(3)/MCL (ref 2.1–9.2)
NEUTROPHILS NFR BLD AUTO: 67.4 %
NRBC BLD AUTO-RTO: 0 %
PLATELET # BLD AUTO: 363 X10(3)/MCL (ref 130–400)
PMV BLD AUTO: 11.9 FL (ref 7.4–10.4)
POTASSIUM SERPL-SCNC: 4.4 MMOL/L (ref 3.5–5.1)
PROT SERPL-MCNC: 6.6 GM/DL (ref 5.8–7.6)
RBC # BLD AUTO: 4.19 X10(6)/MCL (ref 4.2–5.4)
SODIUM SERPL-SCNC: 144 MMOL/L (ref 136–145)
WBC # SPEC AUTO: 9.5 X10(3)/MCL (ref 4.5–11.5)

## 2023-03-14 PROCEDURE — 93010 ELECTROCARDIOGRAM REPORT: CPT | Mod: ,,, | Performed by: INTERNAL MEDICINE

## 2023-03-14 PROCEDURE — 80053 COMPREHEN METABOLIC PANEL: CPT

## 2023-03-14 PROCEDURE — 36415 COLL VENOUS BLD VENIPUNCTURE: CPT

## 2023-03-14 PROCEDURE — 93005 ELECTROCARDIOGRAM TRACING: CPT

## 2023-03-14 PROCEDURE — 85025 COMPLETE CBC W/AUTO DIFF WBC: CPT

## 2023-03-14 PROCEDURE — 93010 EKG 12-LEAD: ICD-10-PCS | Mod: ,,, | Performed by: INTERNAL MEDICINE

## 2023-04-03 ENCOUNTER — OFFICE VISIT (OUTPATIENT)
Dept: ORTHOPEDICS | Facility: CLINIC | Age: 78
End: 2023-04-03
Payer: MEDICARE

## 2023-04-03 VITALS
BODY MASS INDEX: 30.76 KG/M2 | DIASTOLIC BLOOD PRESSURE: 81 MMHG | SYSTOLIC BLOOD PRESSURE: 152 MMHG | WEIGHT: 196 LBS | HEIGHT: 67 IN | HEART RATE: 70 BPM

## 2023-04-03 DIAGNOSIS — M17.11 PRIMARY OSTEOARTHRITIS OF RIGHT KNEE: Primary | ICD-10-CM

## 2023-04-03 PROCEDURE — 3079F DIAST BP 80-89 MM HG: CPT | Mod: CPTII,,, | Performed by: PHYSICIAN ASSISTANT

## 2023-04-03 PROCEDURE — 1160F PR REVIEW ALL MEDS BY PRESCRIBER/CLIN PHARMACIST DOCUMENTED: ICD-10-PCS | Mod: CPTII,,, | Performed by: PHYSICIAN ASSISTANT

## 2023-04-03 PROCEDURE — 1160F RVW MEDS BY RX/DR IN RCRD: CPT | Mod: CPTII,,, | Performed by: PHYSICIAN ASSISTANT

## 2023-04-03 PROCEDURE — 99213 PR OFFICE/OUTPT VISIT, EST, LEVL III, 20-29 MIN: ICD-10-PCS | Mod: 25,,, | Performed by: PHYSICIAN ASSISTANT

## 2023-04-03 PROCEDURE — 20610 LARGE JOINT ASPIRATION/INJECTION: R KNEE: ICD-10-PCS | Mod: RT,,, | Performed by: PHYSICIAN ASSISTANT

## 2023-04-03 PROCEDURE — 1159F MED LIST DOCD IN RCRD: CPT | Mod: CPTII,,, | Performed by: PHYSICIAN ASSISTANT

## 2023-04-03 PROCEDURE — 3079F PR MOST RECENT DIASTOLIC BLOOD PRESSURE 80-89 MM HG: ICD-10-PCS | Mod: CPTII,,, | Performed by: PHYSICIAN ASSISTANT

## 2023-04-03 PROCEDURE — 3077F SYST BP >= 140 MM HG: CPT | Mod: CPTII,,, | Performed by: PHYSICIAN ASSISTANT

## 2023-04-03 PROCEDURE — 3077F PR MOST RECENT SYSTOLIC BLOOD PRESSURE >= 140 MM HG: ICD-10-PCS | Mod: CPTII,,, | Performed by: PHYSICIAN ASSISTANT

## 2023-04-03 PROCEDURE — 99213 OFFICE O/P EST LOW 20 MIN: CPT | Mod: 25,,, | Performed by: PHYSICIAN ASSISTANT

## 2023-04-03 PROCEDURE — 1159F PR MEDICATION LIST DOCUMENTED IN MEDICAL RECORD: ICD-10-PCS | Mod: CPTII,,, | Performed by: PHYSICIAN ASSISTANT

## 2023-04-03 PROCEDURE — 20610 DRAIN/INJ JOINT/BURSA W/O US: CPT | Mod: RT,,, | Performed by: PHYSICIAN ASSISTANT

## 2023-04-03 RX ORDER — BETAMETHASONE SODIUM PHOSPHATE AND BETAMETHASONE ACETATE 3; 3 MG/ML; MG/ML
12 INJECTION, SUSPENSION INTRA-ARTICULAR; INTRALESIONAL; INTRAMUSCULAR; SOFT TISSUE
Status: DISCONTINUED | OUTPATIENT
Start: 2023-04-03 | End: 2023-04-03 | Stop reason: HOSPADM

## 2023-04-03 RX ADMIN — BETAMETHASONE SODIUM PHOSPHATE AND BETAMETHASONE ACETATE 12 MG: 3; 3 INJECTION, SUSPENSION INTRA-ARTICULAR; INTRALESIONAL; INTRAMUSCULAR; SOFT TISSUE at 02:04

## 2023-04-03 NOTE — PROGRESS NOTES
Chief Complaint:   Chief Complaint   Patient presents with    Right Knee - Injections     Right knee cortisone inj       History of present illness:    77-year-old female presents office today for cortisone injection to the right knee.  She is a known history of osteoarthritis.  No new complaints or concerns today.  Her pain is lateral-sided    Past Medical History:   Diagnosis Date    Calculus of kidney     Hyperlipemia     Hypertension        Past Surgical History:   Procedure Laterality Date    CATARACT EXTRACTION      KNEE SURGERY      TONSILLECTOMY      TUBAL LIGATION         Current Outpatient Medications   Medication Sig    ascorbic acid, vitamin C, (VITAMIN C) 500 MG tablet 1 tablet.    aspirin (ECOTRIN) 81 MG EC tablet Take 81 mg by mouth once daily.    atorvastatin (LIPITOR) 10 MG tablet Take 10 mg by mouth.    busPIRone (BUSPAR) 7.5 MG tablet Take 7.5 mg by mouth.    cetirizine (ZYRTEC) 5 MG tablet Take 5 mg by mouth once daily.    dicyclomine (BENTYL) 20 mg tablet Take 20 mg by mouth 3 (three) times daily.    fluticasone (FLONASE) 50 mcg/actuation nasal spray 2 sprays by Each Nare route 2 (two) times daily.    hydrOXYzine HCL (ATARAX) 25 MG tablet Take 25 mg by mouth nightly as needed.    ketorolac 0.5% (ACULAR) 0.5 % Drop     lorazepam (ATIVAN) 1 MG tablet Take 0.5 tablets (0.5 mg total) by mouth every 8 (eight) hours as needed for Anxiety.    methylPREDNISolone (MEDROL DOSEPACK) 4 mg tablet Take by mouth.    pantoprazole (PROTONIX) 40 MG tablet Take 40 mg by mouth.    prednisoLONE acetate (PRED FORTE) 1 % DrpS Place into the right eye.    predniSONE (DELTASONE) 10 MG tablet Take 1 tablet by mouth once daily.    rosuvastatin (CRESTOR) 5 MG tablet Take 5 mg by mouth once daily.    tobramycin sulfate 0.3% (TOBREX) 0.3 % ophthalmic solution     vitamin D (VITAMIN D3) 1000 units Tab Take 1,000 Units by mouth once daily.     No current facility-administered medications for this visit.       Review of  patient's allergies indicates:   Allergen Reactions    Bactrim [sulfamethoxazole-trimethoprim] Nausea And Vomiting    Ezetimibe Itching    Levofloxacin Other (See Comments)    Lortab [hydrocodone-acetaminophen] Itching    Tylenol-codeine #3 [acetaminophen-codeine] Other (See Comments)       Family History   Problem Relation Age of Onset    Hypertension Mother     Hypertension Father     Hypertension Sister     Breast cancer Sister     Hypertension Brother     No Known Problems Maternal Aunt     No Known Problems Maternal Uncle     No Known Problems Paternal Aunt     No Known Problems Paternal Uncle     No Known Problems Maternal Grandmother     No Known Problems Maternal Grandfather     No Known Problems Paternal Grandmother     No Known Problems Paternal Grandfather     No Known Problems Other     Anesthesia problems Neg Hx     Broken bones Neg Hx     Cancer Neg Hx     Clotting disorder Neg Hx     Collagen disease Neg Hx     Diabetes Neg Hx     Dislocations Neg Hx     Osteoporosis Neg Hx     Rheumatologic disease Neg Hx     Scoliosis Neg Hx     Severe sprains Neg Hx        Social History     Socioeconomic History    Marital status: Single   Tobacco Use    Smoking status: Never    Smokeless tobacco: Never   Substance and Sexual Activity    Alcohol use: Yes     Alcohol/week: 1.0 standard drink     Types: 1 Cans of beer per week     Comment: Occasionally    Drug use: Never    Sexual activity: Yes     Partners: Male         Review of Systems:    Constitution:   Denies chills, fever, and sweats.  HENT:   Denies headaches or blurry vision.  Cardiovascular:  Denies chest pain or irregular heart beat.  Respiratory:   Denies cough or shortness of breath.  Gastrointestinal:  Denies abdominal pain, nausea, or vomiting.  Musculoskeletal:   Denies muscle cramps.  Neurological:   Denies dizziness or focal weakness.  Psychiatric/Behavior: Normal mental status.  Hematology/Lymph:  Denies bleeding problem or easy  "bruising/bleeding.  Skin:    Denies rash or suspicious lesions.    Examination:    Vital Signs:    Vitals:    04/03/23 1455   BP: (!) 152/81   Pulse: 70   Weight: 88.9 kg (196 lb)   Height: 5' 7" (1.702 m)       Body mass index is 30.7 kg/m².    Constitution:   Well-developed, well nourished patient in no acute distress.  Neurological:   Alert and oriented x 3 and cooperative to examination.     Psychiatric/Behavior: Normal mental status.  Respiratory:   No shortness of breath.  Eyes:    Extraoccular muscles intact  Skin:    No scars, rash or suspicious lesions.    Physical Exam:     Right Knee:     Grade 1 effusion    Valgus deformity    No erythema or increased heat Patella demonstrated crepitus.    Anterolateral aspect was tender on palpation. Lateral aspect was tender on palpation. Iliotibial Band was tender on palpation.    No medial joint line pain   Active flexion 120 degrees   Active extension 5 degrees   antalgic gait was observed.             Assessment:     Primary osteoarthritis of right knee  -     Large Joint Aspiration/Injection: R knee        Plan:      Conservative treatment consisting of corticosteroid injection today.  She will follow up with us as needed.  She is interested in Synvisc-One injection in the future.  She will contact us when she is ready to get that authorized        No follow-ups on file.    DISCLAIMER: This note may have been dictated using voice recognition software and may contain grammatical errors.     "

## 2023-04-03 NOTE — PROCEDURES
Large Joint Aspiration/Injection: R knee    Date/Time: 4/3/2023 2:45 PM  Performed by: SIERRA Fry  Authorized by: SIERRA Fry     Consent Done?:  Yes (Verbal)  Indications:  Arthritis  Timeout: prior to procedure the correct patient, procedure, and site was verified    Prep: patient was prepped and draped in usual sterile fashion      Local anesthesia used?: Yes    Local anesthetic:  Lidocaine 2% without epinephrine    Details:  Needle Size:  25 G  Ultrasonic Guidance for needle placement?: No    Location:  Knee  Site:  R knee  Medications:  12 mg betamethasone acetate-betamethasone sodium phosphate 6 mg/mL  Patient tolerance:  Patient tolerated the procedure well with no immediate complications

## 2023-04-11 ENCOUNTER — HOSPITAL ENCOUNTER (OUTPATIENT)
Facility: HOSPITAL | Age: 78
Discharge: HOME | End: 2023-04-11
Attending: UROLOGY | Admitting: UROLOGY
Payer: MEDICARE

## 2023-04-11 ENCOUNTER — ANESTHESIA (OUTPATIENT)
Dept: SURGERY | Facility: HOSPITAL | Age: 78
End: 2023-04-11
Payer: MEDICARE

## 2023-04-11 ENCOUNTER — ANESTHESIA EVENT (OUTPATIENT)
Dept: SURGERY | Facility: HOSPITAL | Age: 78
End: 2023-04-11
Payer: MEDICARE

## 2023-04-11 DIAGNOSIS — N20.0 CALCULUS, RENAL: Primary | ICD-10-CM

## 2023-04-11 PROCEDURE — 63600175 PHARM REV CODE 636 W HCPCS: Performed by: ANESTHESIOLOGY

## 2023-04-11 PROCEDURE — 71000015 HC POSTOP RECOV 1ST HR: Performed by: UROLOGY

## 2023-04-11 PROCEDURE — 36000704 HC OR TIME LEV I 1ST 15 MIN: Performed by: UROLOGY

## 2023-04-11 PROCEDURE — 25000003 PHARM REV CODE 250

## 2023-04-11 PROCEDURE — 63600175 PHARM REV CODE 636 W HCPCS: Performed by: NURSE ANESTHETIST, CERTIFIED REGISTERED

## 2023-04-11 PROCEDURE — D9220A PRA ANESTHESIA: ICD-10-PCS | Mod: CRNA,,, | Performed by: NURSE ANESTHETIST, CERTIFIED REGISTERED

## 2023-04-11 PROCEDURE — 37000008 HC ANESTHESIA 1ST 15 MINUTES: Performed by: UROLOGY

## 2023-04-11 PROCEDURE — D9220A PRA ANESTHESIA: Mod: CRNA,,, | Performed by: NURSE ANESTHETIST, CERTIFIED REGISTERED

## 2023-04-11 PROCEDURE — 37000009 HC ANESTHESIA EA ADD 15 MINS: Performed by: UROLOGY

## 2023-04-11 PROCEDURE — 25000003 PHARM REV CODE 250: Performed by: NURSE ANESTHETIST, CERTIFIED REGISTERED

## 2023-04-11 PROCEDURE — D9220A PRA ANESTHESIA: Mod: ANES,,, | Performed by: ANESTHESIOLOGY

## 2023-04-11 PROCEDURE — D9220A PRA ANESTHESIA: ICD-10-PCS | Mod: ANES,,, | Performed by: ANESTHESIOLOGY

## 2023-04-11 PROCEDURE — 71000033 HC RECOVERY, INTIAL HOUR: Performed by: UROLOGY

## 2023-04-11 PROCEDURE — 36000705 HC OR TIME LEV I EA ADD 15 MIN: Performed by: UROLOGY

## 2023-04-11 PROCEDURE — 71000016 HC POSTOP RECOV ADDL HR: Performed by: UROLOGY

## 2023-04-11 RX ORDER — METHOCARBAMOL 100 MG/ML
500 INJECTION, SOLUTION INTRAMUSCULAR; INTRAVENOUS ONCE
Status: COMPLETED | OUTPATIENT
Start: 2023-04-11 | End: 2023-04-11

## 2023-04-11 RX ORDER — DEXAMETHASONE SODIUM PHOSPHATE 4 MG/ML
INJECTION, SOLUTION INTRA-ARTICULAR; INTRALESIONAL; INTRAMUSCULAR; INTRAVENOUS; SOFT TISSUE
Status: DISCONTINUED | OUTPATIENT
Start: 2023-04-11 | End: 2023-04-11

## 2023-04-11 RX ORDER — HYDROMORPHONE HYDROCHLORIDE 2 MG/ML
INJECTION, SOLUTION INTRAMUSCULAR; INTRAVENOUS; SUBCUTANEOUS
Status: DISCONTINUED
Start: 2023-04-11 | End: 2023-04-11 | Stop reason: HOSPADM

## 2023-04-11 RX ORDER — CEFAZOLIN 2 G/1
INJECTION, POWDER, FOR SOLUTION INTRAMUSCULAR; INTRAVENOUS
Status: DISCONTINUED
Start: 2023-04-11 | End: 2023-04-11 | Stop reason: HOSPADM

## 2023-04-11 RX ORDER — VIT C/E/ZN/COPPR/LUTEIN/ZEAXAN 250MG-90MG
1 CAPSULE ORAL DAILY
COMMUNITY

## 2023-04-11 RX ORDER — ASPIRIN 325 MG
50 TABLET, DELAYED RELEASE (ENTERIC COATED) ORAL DAILY
COMMUNITY

## 2023-04-11 RX ORDER — ONDANSETRON 2 MG/ML
4 INJECTION INTRAMUSCULAR; INTRAVENOUS EVERY 12 HOURS PRN
Status: DISCONTINUED | OUTPATIENT
Start: 2023-04-11 | End: 2023-04-11 | Stop reason: HOSPADM

## 2023-04-11 RX ORDER — LIDOCAINE HYDROCHLORIDE 10 MG/ML
1 INJECTION, SOLUTION EPIDURAL; INFILTRATION; INTRACAUDAL; PERINEURAL ONCE
Status: DISCONTINUED | OUTPATIENT
Start: 2023-04-11 | End: 2023-04-11 | Stop reason: HOSPADM

## 2023-04-11 RX ORDER — METHOCARBAMOL 100 MG/ML
INJECTION, SOLUTION INTRAMUSCULAR; INTRAVENOUS
Status: DISCONTINUED
Start: 2023-04-11 | End: 2023-04-11 | Stop reason: HOSPADM

## 2023-04-11 RX ORDER — ONDANSETRON 2 MG/ML
4 INJECTION INTRAMUSCULAR; INTRAVENOUS ONCE AS NEEDED
Status: COMPLETED | OUTPATIENT
Start: 2023-04-11 | End: 2023-04-11

## 2023-04-11 RX ORDER — ASCORBIC ACID 1000 MG
175 TABLET ORAL DAILY
COMMUNITY

## 2023-04-11 RX ORDER — HYDROMORPHONE HYDROCHLORIDE 2 MG/ML
0.2 INJECTION, SOLUTION INTRAMUSCULAR; INTRAVENOUS; SUBCUTANEOUS EVERY 5 MIN PRN
Status: DISCONTINUED | OUTPATIENT
Start: 2023-04-11 | End: 2023-04-11 | Stop reason: HOSPADM

## 2023-04-11 RX ORDER — ZINC GLUCONATE 50 MG
50 TABLET ORAL DAILY
COMMUNITY

## 2023-04-11 RX ORDER — KETOROLAC TROMETHAMINE 10 MG/1
10 TABLET, FILM COATED ORAL EVERY 8 HOURS PRN
Qty: 21 TABLET | Refills: 1 | Status: ON HOLD | OUTPATIENT
Start: 2023-04-11 | End: 2024-03-08 | Stop reason: HOSPADM

## 2023-04-11 RX ORDER — TAMSULOSIN HYDROCHLORIDE 0.4 MG/1
0.4 CAPSULE ORAL DAILY
Qty: 30 CAPSULE | Refills: 2 | Status: SHIPPED | OUTPATIENT
Start: 2023-04-11 | End: 2023-05-11

## 2023-04-11 RX ORDER — LANOLIN ALCOHOL/MO/W.PET/CERES
400 CREAM (GRAM) TOPICAL DAILY
COMMUNITY

## 2023-04-11 RX ORDER — CEFAZOLIN SODIUM 1 G/3ML
INJECTION, POWDER, FOR SOLUTION INTRAMUSCULAR; INTRAVENOUS
Status: DISCONTINUED | OUTPATIENT
Start: 2023-04-11 | End: 2023-04-11

## 2023-04-11 RX ORDER — TAMSULOSIN HYDROCHLORIDE 0.4 MG/1
0.4 CAPSULE ORAL DAILY
Status: DISCONTINUED | OUTPATIENT
Start: 2023-04-12 | End: 2023-04-11 | Stop reason: HOSPADM

## 2023-04-11 RX ORDER — FENTANYL CITRATE 50 UG/ML
INJECTION, SOLUTION INTRAMUSCULAR; INTRAVENOUS
Status: DISCONTINUED | OUTPATIENT
Start: 2023-04-11 | End: 2023-04-11

## 2023-04-11 RX ORDER — KETOROLAC TROMETHAMINE 10 MG/1
10 TABLET, FILM COATED ORAL EVERY 8 HOURS PRN
Status: DISCONTINUED | OUTPATIENT
Start: 2023-04-11 | End: 2023-04-11

## 2023-04-11 RX ORDER — KETOROLAC TROMETHAMINE 30 MG/ML
15 INJECTION, SOLUTION INTRAMUSCULAR; INTRAVENOUS EVERY 6 HOURS PRN
Status: DISCONTINUED | OUTPATIENT
Start: 2023-04-11 | End: 2023-04-11 | Stop reason: HOSPADM

## 2023-04-11 RX ORDER — CEFAZOLIN 2 G/1
2 INJECTION, POWDER, FOR SOLUTION INTRAMUSCULAR; INTRAVENOUS ONCE
Status: DISCONTINUED | OUTPATIENT
Start: 2023-04-11 | End: 2023-04-11 | Stop reason: HOSPADM

## 2023-04-11 RX ORDER — TAMSULOSIN HYDROCHLORIDE 0.4 MG/1
CAPSULE ORAL
Status: COMPLETED
Start: 2023-04-11 | End: 2023-04-11

## 2023-04-11 RX ORDER — SODIUM CHLORIDE, SODIUM LACTATE, POTASSIUM CHLORIDE, CALCIUM CHLORIDE 600; 310; 30; 20 MG/100ML; MG/100ML; MG/100ML; MG/100ML
INJECTION, SOLUTION INTRAVENOUS CONTINUOUS
Status: DISCONTINUED | OUTPATIENT
Start: 2023-04-11 | End: 2023-04-11 | Stop reason: HOSPADM

## 2023-04-11 RX ORDER — FAMOTIDINE 20 MG/1
20 TABLET, FILM COATED ORAL
COMMUNITY
Start: 2023-02-13

## 2023-04-11 RX ORDER — MIDAZOLAM HYDROCHLORIDE 1 MG/ML
2 INJECTION INTRAMUSCULAR; INTRAVENOUS ONCE AS NEEDED
Status: COMPLETED | OUTPATIENT
Start: 2023-04-11 | End: 2023-04-11

## 2023-04-11 RX ORDER — PROPOFOL 10 MG/ML
VIAL (ML) INTRAVENOUS
Status: DISCONTINUED | OUTPATIENT
Start: 2023-04-11 | End: 2023-04-11

## 2023-04-11 RX ORDER — LIDOCAINE HYDROCHLORIDE 10 MG/ML
INJECTION, SOLUTION EPIDURAL; INFILTRATION; INTRACAUDAL; PERINEURAL
Status: DISCONTINUED | OUTPATIENT
Start: 2023-04-11 | End: 2023-04-11

## 2023-04-11 RX ORDER — KETOROLAC TROMETHAMINE 10 MG/1
TABLET, FILM COATED ORAL
Status: COMPLETED
Start: 2023-04-11 | End: 2023-04-11

## 2023-04-11 RX ADMIN — KETOROLAC TROMETHAMINE 10 MG: 10 TABLET, FILM COATED ORAL at 06:04

## 2023-04-11 RX ADMIN — PROPOFOL 175 MG: 10 INJECTION, EMULSION INTRAVENOUS at 02:04

## 2023-04-11 RX ADMIN — HYDROMORPHONE HYDROCHLORIDE 0.2 MG: 2 INJECTION, SOLUTION INTRAMUSCULAR; INTRAVENOUS; SUBCUTANEOUS at 03:04

## 2023-04-11 RX ADMIN — MIDAZOLAM HYDROCHLORIDE 2 MG: 1 INJECTION, SOLUTION INTRAMUSCULAR; INTRAVENOUS at 02:04

## 2023-04-11 RX ADMIN — FENTANYL CITRATE 50 MCG: 50 INJECTION, SOLUTION INTRAMUSCULAR; INTRAVENOUS at 02:04

## 2023-04-11 RX ADMIN — ONDANSETRON 4 MG: 2 INJECTION INTRAMUSCULAR; INTRAVENOUS at 06:04

## 2023-04-11 RX ADMIN — LIDOCAINE HYDROCHLORIDE 50 MG: 10 INJECTION, SOLUTION EPIDURAL; INFILTRATION; INTRACAUDAL; PERINEURAL at 02:04

## 2023-04-11 RX ADMIN — CEFAZOLIN 2 G: 330 INJECTION, POWDER, FOR SOLUTION INTRAMUSCULAR; INTRAVENOUS at 02:04

## 2023-04-11 RX ADMIN — TAMSULOSIN HYDROCHLORIDE 0.4 MG: 0.4 CAPSULE ORAL at 06:04

## 2023-04-11 RX ADMIN — METHOCARBAMOL 500 MG: 100 INJECTION INTRAMUSCULAR; INTRAVENOUS at 03:04

## 2023-04-11 RX ADMIN — DEXAMETHASONE SODIUM PHOSPHATE 4 MG: 4 INJECTION, SOLUTION INTRA-ARTICULAR; INTRALESIONAL; INTRAMUSCULAR; INTRAVENOUS; SOFT TISSUE at 02:04

## 2023-04-11 NOTE — DISCHARGE SUMMARY
[facility] Surgical - Periop Services  Discharge Note  Short Stay    Procedure(s) (LRB):  LITHOTRIPSY, ESWL Left  Renal Calculus  / Possible Bilateral Retrogrades (Left)      OUTCOME: Patient tolerated treatment/procedure well without complication and is now ready for discharge.    DISPOSITION: Home or Self Care    FINAL DIAGNOSIS:  Calculus, renal    FOLLOWUP: In clinic    DISCHARGE INSTRUCTIONS:    Discharge Procedure Orders   Diet general     Other restrictions (specify):   Order Comments: Activity should be light for 48 hours then as tolerated.  Strain urine and bring fragments to follow-up appointment please     Call MD for:  temperature >100.4     Call MD for:  persistent nausea and vomiting     Call MD for:  severe uncontrolled pain     No dressing needed        TIME SPENT ON DISCHARGE: 20 minutes

## 2023-04-11 NOTE — ANESTHESIA PREPROCEDURE EVALUATION
04/11/2023  Jeana Muhammad is a 77 y.o., female , who presents for the following:    Procedures:        LITHOTRIPSY, ESWL Left  Renal Calculus  / Possible Bilateral Retrogrades (Left)       CYSTOSCOPY, WITH RETROGRADE PYELOGRAM Possible (Bilateral)       CYSTOSCOPY, WITH URETERAL STENT INSERTION Left Possible (Left)   Anesthesia type: General   Diagnosis: Calculus, renal [N20.0]   Pre-op diagnosis: Calculus, renal [N20.0]   Location: [facility] OR  / [facility] OR   Surgeons: Be Titus MD       Pre-op Assessment    I have reviewed the Patient Summary Reports.     I have reviewed the Nursing Notes. I have reviewed the NPO Status.   I have reviewed the Medications.     Review of Systems  Anesthesia Hx:  No problems with previous Anesthesia  Denies Family Hx of Anesthesia complications.   Denies Personal Hx of Anesthesia complications.   Social:  Non-Smoker    Cardiovascular:   Hypertension hyperlipidemia    Renal/:   renal calculi    Endocrine:  Endocrine Normal        Physical Exam  General: Alert and Oriented    Airway:  Mallampati: III   Mouth Opening: Normal  TM Distance: Normal  Tongue: Large  Neck ROM: Normal ROM    Dental:  Intact    Chest/Lungs:  Normal Respiratory Rate    Heart:  Rate: Normal  Rhythm: Regular Rhythm      02/10 1104 03/14 1547    HGB 12.6     12.4       WBC 7.8     9.5       Platelets 343     363            144       K+ 4.2     4.4       Creatinine 0.65     0.73       Glucose 93     110          CXR : NAF    EKG : NSR      Anesthesia Plan  Type of Anesthesia, risks & benefits discussed:    Anesthesia Type: Gen Supraglottic Airway  Intra-op Monitoring Plan: Standard ASA Monitors  Post Op Pain Control Plan: IV/PO Opioids PRN and multimodal analgesia  Induction:  IV  Airway Plan: Direct, Post-Induction  Informed Consent: Informed consent signed with the Patient and all parties  understand the risks and agree with anesthesia plan.  All questions answered.   ASA Score: 2  Day of Surgery Review of History & Physical: H&P Update referred to the surgeon/provider.    Ready For Surgery From Anesthesia Perspective.     .

## 2023-04-11 NOTE — ANESTHESIA POSTPROCEDURE EVALUATION
Anesthesia Post Evaluation    Patient: Jeana Muhammad    Procedure(s) Performed: Procedure(s) (LRB):  LITHOTRIPSY, ESWL Left  Renal Calculus  / Possible Bilateral Retrogrades (Left)    Final Anesthesia Type: general      Patient location during evaluation: PACU  Patient participation: No - Unable to Participate, Sedation  Level of consciousness: sedated  Post-procedure vital signs: reviewed and stable  Pain management: adequate  Airway patency: patent  MADDY mitigation strategies: Multimodal analgesia  PONV status at discharge: No PONV  Anesthetic complications: no      Cardiovascular status: stable  Respiratory status: nasal cannula  Hydration status: euvolemic  Follow-up not needed.          Vitals Value Taken Time   /70 04/11/23 1457   Temp 36.5 04/11/23 1457   Pulse 77 04/11/23 1457   Resp 18 04/11/23 1457   SpO2 99 04/11/23 1457         No case tracking events are documented in the log.      Pain/Jose Score: No data recorded

## 2023-04-11 NOTE — CARE UPDATE
Surgeon present at bedside talking with patient. Updated him on her status, v/s, meds given. No new orders given.

## 2023-04-11 NOTE — CARE UPDATE
Received patient from the OR-she is arousing upon arrival,aparna,rejected oral airway, oriented/reassured her, c/o of slight pain, respirations full-regular-deep-clear.

## 2023-04-12 VITALS
DIASTOLIC BLOOD PRESSURE: 85 MMHG | HEIGHT: 67 IN | OXYGEN SATURATION: 98 % | BODY MASS INDEX: 30.65 KG/M2 | TEMPERATURE: 99 F | RESPIRATION RATE: 18 BRPM | WEIGHT: 195.31 LBS | SYSTOLIC BLOOD PRESSURE: 152 MMHG | HEART RATE: 72 BPM

## 2023-07-21 ENCOUNTER — OFFICE VISIT (OUTPATIENT)
Dept: ENDOCRINOLOGY | Facility: CLINIC | Age: 78
End: 2023-07-21
Payer: MEDICARE

## 2023-07-21 VITALS
TEMPERATURE: 98 F | SYSTOLIC BLOOD PRESSURE: 138 MMHG | DIASTOLIC BLOOD PRESSURE: 76 MMHG | HEIGHT: 67 IN | RESPIRATION RATE: 15 BRPM | HEART RATE: 69 BPM | BODY MASS INDEX: 30.45 KG/M2 | WEIGHT: 194 LBS

## 2023-07-21 DIAGNOSIS — E83.52 HYPERCALCEMIA: ICD-10-CM

## 2023-07-21 DIAGNOSIS — E55.9 VITAMIN D DEFICIENCY: ICD-10-CM

## 2023-07-21 DIAGNOSIS — E21.3 HYPERPARATHYROIDISM: Primary | ICD-10-CM

## 2023-07-21 DIAGNOSIS — N20.0 KIDNEY STONE: ICD-10-CM

## 2023-07-21 DIAGNOSIS — M85.80 OSTEOPENIA, UNSPECIFIED LOCATION: ICD-10-CM

## 2023-07-21 LAB
ALBUMIN SERPL-MCNC: 3.7 G/DL (ref 3.4–4.8)
BUN SERPL-MCNC: 11.6 MG/DL (ref 9.8–20.1)
CALCIUM SERPL-MCNC: 10.4 MG/DL (ref 8.4–10.2)
CHLORIDE SERPL-SCNC: 106 MMOL/L (ref 98–107)
CO2 SERPL-SCNC: 28 MMOL/L (ref 23–31)
CREAT SERPL-MCNC: 0.68 MG/DL (ref 0.55–1.02)
DEPRECATED CALCIDIOL+CALCIFEROL SERPL-MC: 64.7 NG/ML (ref 30–80)
GFR SERPLBLD CREATININE-BSD FMLA CKD-EPI: >60 MLS/MIN/1.73/M2
GLUCOSE SERPL-MCNC: 83 MG/DL (ref 82–115)
PHOSPHATE SERPL-MCNC: 2.5 MG/DL (ref 2.3–4.7)
POTASSIUM SERPL-SCNC: 4 MMOL/L (ref 3.5–5.1)
PTH-INTACT SERPL-MCNC: 120.4 PG/ML (ref 8.7–77)
SODIUM SERPL-SCNC: 141 MMOL/L (ref 136–145)

## 2023-07-21 PROCEDURE — 83970 ASSAY OF PARATHORMONE: CPT | Performed by: NURSE PRACTITIONER

## 2023-07-21 PROCEDURE — 36415 COLL VENOUS BLD VENIPUNCTURE: CPT | Performed by: NURSE PRACTITIONER

## 2023-07-21 PROCEDURE — 3078F DIAST BP <80 MM HG: CPT | Mod: CPTII,,, | Performed by: NURSE PRACTITIONER

## 2023-07-21 PROCEDURE — 3075F SYST BP GE 130 - 139MM HG: CPT | Mod: CPTII,,, | Performed by: NURSE PRACTITIONER

## 2023-07-21 PROCEDURE — 1101F PT FALLS ASSESS-DOCD LE1/YR: CPT | Mod: CPTII,,, | Performed by: NURSE PRACTITIONER

## 2023-07-21 PROCEDURE — 1159F PR MEDICATION LIST DOCUMENTED IN MEDICAL RECORD: ICD-10-PCS | Mod: CPTII,,, | Performed by: NURSE PRACTITIONER

## 2023-07-21 PROCEDURE — 1160F RVW MEDS BY RX/DR IN RCRD: CPT | Mod: CPTII,,, | Performed by: NURSE PRACTITIONER

## 2023-07-21 PROCEDURE — 99214 PR OFFICE/OUTPT VISIT, EST, LEVL IV, 30-39 MIN: ICD-10-PCS | Mod: S$PBB,,, | Performed by: NURSE PRACTITIONER

## 2023-07-21 PROCEDURE — 80069 RENAL FUNCTION PANEL: CPT | Performed by: NURSE PRACTITIONER

## 2023-07-21 PROCEDURE — 99215 OFFICE O/P EST HI 40 MIN: CPT | Mod: PBBFAC | Performed by: NURSE PRACTITIONER

## 2023-07-21 PROCEDURE — 1101F PR PT FALLS ASSESS DOC 0-1 FALLS W/OUT INJ PAST YR: ICD-10-PCS | Mod: CPTII,,, | Performed by: NURSE PRACTITIONER

## 2023-07-21 PROCEDURE — 3075F PR MOST RECENT SYSTOLIC BLOOD PRESS GE 130-139MM HG: ICD-10-PCS | Mod: CPTII,,, | Performed by: NURSE PRACTITIONER

## 2023-07-21 PROCEDURE — 1160F PR REVIEW ALL MEDS BY PRESCRIBER/CLIN PHARMACIST DOCUMENTED: ICD-10-PCS | Mod: CPTII,,, | Performed by: NURSE PRACTITIONER

## 2023-07-21 PROCEDURE — 99214 OFFICE O/P EST MOD 30 MIN: CPT | Mod: S$PBB,,, | Performed by: NURSE PRACTITIONER

## 2023-07-21 PROCEDURE — 3288F PR FALLS RISK ASSESSMENT DOCUMENTED: ICD-10-PCS | Mod: CPTII,,, | Performed by: NURSE PRACTITIONER

## 2023-07-21 PROCEDURE — 1159F MED LIST DOCD IN RCRD: CPT | Mod: CPTII,,, | Performed by: NURSE PRACTITIONER

## 2023-07-21 PROCEDURE — 82306 VITAMIN D 25 HYDROXY: CPT | Performed by: NURSE PRACTITIONER

## 2023-07-21 PROCEDURE — 3078F PR MOST RECENT DIASTOLIC BLOOD PRESSURE < 80 MM HG: ICD-10-PCS | Mod: CPTII,,, | Performed by: NURSE PRACTITIONER

## 2023-07-21 PROCEDURE — 3288F FALL RISK ASSESSMENT DOCD: CPT | Mod: CPTII,,, | Performed by: NURSE PRACTITIONER

## 2023-08-01 ENCOUNTER — TELEPHONE (OUTPATIENT)
Dept: ENDOCRINOLOGY | Facility: CLINIC | Age: 78
End: 2023-08-01
Payer: MEDICARE

## 2023-08-01 NOTE — TELEPHONE ENCOUNTER
Spoke with patient regarding her results informed her that her calcium and parathyroid hormone levels remain elevated. Her vitamin-D level is normal.  She has not had CT scan done, [facility] called to schedule but she did not schedule due to her having had kidney stones wanted to ok this with her PCP, was given the all ok to schedule.  Provided her with the number to central scheduling so she can schedule her CT.

## 2023-08-01 NOTE — TELEPHONE ENCOUNTER
----- Message from Mariangel Ta sent at 8/1/2023  1:13 PM CDT -----  Regarding: Results  ZACHARY PT       Pt is requesting a call for lab results.   Pt # [phone]

## 2023-08-09 ENCOUNTER — HOSPITAL ENCOUNTER (OUTPATIENT)
Dept: RADIOLOGY | Facility: HOSPITAL | Age: 78
Discharge: HOME | End: 2023-08-09
Attending: NURSE PRACTITIONER
Payer: MEDICARE

## 2023-08-09 DIAGNOSIS — E83.52 HYPERCALCEMIA: ICD-10-CM

## 2023-08-09 DIAGNOSIS — E21.3 HYPERPARATHYROIDISM: ICD-10-CM

## 2023-08-09 DIAGNOSIS — M85.80 OSTEOPENIA, UNSPECIFIED LOCATION: ICD-10-CM

## 2023-08-09 PROCEDURE — 70492 CT SFT TSUE NCK W/O & W/DYE: CPT | Mod: TC

## 2023-08-09 PROCEDURE — 25500020 PHARM REV CODE 255: Performed by: NURSE PRACTITIONER

## 2023-08-09 PROCEDURE — 77081 DXA BONE DENSITY APPENDICULR: CPT | Mod: TC

## 2023-08-09 RX ADMIN — IOHEXOL 75 ML: 350 INJECTION, SOLUTION INTRAVENOUS at 01:08

## 2023-08-24 ENCOUNTER — LAB VISIT (OUTPATIENT)
Dept: LAB | Facility: HOSPITAL | Age: 78
End: 2023-08-24
Attending: FAMILY MEDICINE
Payer: MEDICARE

## 2023-08-24 DIAGNOSIS — F41.9 ANXIETY: ICD-10-CM

## 2023-08-24 DIAGNOSIS — E78.5 HYPERLIPIDEMIA, UNSPECIFIED HYPERLIPIDEMIA TYPE: Primary | ICD-10-CM

## 2023-08-24 LAB
ALBUMIN SERPL-MCNC: 3.8 G/DL (ref 3.4–4.8)
ALBUMIN/GLOB SERPL: 1.2 RATIO (ref 1.1–2)
ALP SERPL-CCNC: 160 UNIT/L (ref 40–150)
ALT SERPL-CCNC: 16 UNIT/L (ref 0–55)
AST SERPL-CCNC: 15 UNIT/L (ref 5–34)
BASOPHILS # BLD AUTO: 0.06 X10(3)/MCL
BASOPHILS NFR BLD AUTO: 0.7 %
BILIRUB SERPL-MCNC: 0.3 MG/DL
BUN SERPL-MCNC: 12.3 MG/DL (ref 9.8–20.1)
CALCIUM SERPL-MCNC: 10.2 MG/DL (ref 8.4–10.2)
CHLORIDE SERPL-SCNC: 106 MMOL/L (ref 98–107)
CHOLEST SERPL-MCNC: 156 MG/DL
CHOLEST/HDLC SERPL: 5 {RATIO} (ref 0–5)
CO2 SERPL-SCNC: 26 MMOL/L (ref 23–31)
CREAT SERPL-MCNC: 0.67 MG/DL (ref 0.55–1.02)
EOSINOPHIL # BLD AUTO: 0.19 X10(3)/MCL (ref 0–0.9)
EOSINOPHIL NFR BLD AUTO: 2.3 %
ERYTHROCYTE [DISTWIDTH] IN BLOOD BY AUTOMATED COUNT: 14.9 % (ref 11.5–17)
GFR SERPLBLD CREATININE-BSD FMLA CKD-EPI: >60 MLS/MIN/1.73/M2
GLOBULIN SER-MCNC: 3.1 GM/DL (ref 2.4–3.5)
GLUCOSE SERPL-MCNC: 100 MG/DL (ref 82–115)
HCT VFR BLD AUTO: 38.3 % (ref 37–47)
HDLC SERPL-MCNC: 33 MG/DL (ref 35–60)
HGB BLD-MCNC: 12.3 G/DL (ref 12–16)
IMM GRANULOCYTES # BLD AUTO: 0.04 X10(3)/MCL (ref 0–0.04)
IMM GRANULOCYTES NFR BLD AUTO: 0.5 %
LDLC SERPL CALC-MCNC: 98 MG/DL (ref 50–140)
LYMPHOCYTES # BLD AUTO: 1.95 X10(3)/MCL (ref 0.6–4.6)
LYMPHOCYTES NFR BLD AUTO: 23.6 %
MCH RBC QN AUTO: 29.5 PG (ref 27–31)
MCHC RBC AUTO-ENTMCNC: 32.1 G/DL (ref 33–36)
MCV RBC AUTO: 91.8 FL (ref 80–94)
MONOCYTES # BLD AUTO: 0.64 X10(3)/MCL (ref 0.1–1.3)
MONOCYTES NFR BLD AUTO: 7.8 %
NEUTROPHILS # BLD AUTO: 5.37 X10(3)/MCL (ref 2.1–9.2)
NEUTROPHILS NFR BLD AUTO: 65.1 %
NRBC BLD AUTO-RTO: 0 %
PLATELET # BLD AUTO: 360 X10(3)/MCL (ref 130–400)
PMV BLD AUTO: 11.7 FL (ref 7.4–10.4)
POTASSIUM SERPL-SCNC: 4.5 MMOL/L (ref 3.5–5.1)
PROT SERPL-MCNC: 6.9 GM/DL (ref 5.8–7.6)
RBC # BLD AUTO: 4.17 X10(6)/MCL (ref 4.2–5.4)
SODIUM SERPL-SCNC: 143 MMOL/L (ref 136–145)
TRIGL SERPL-MCNC: 124 MG/DL (ref 37–140)
TSH SERPL-ACNC: 1.59 UIU/ML (ref 0.35–4.94)
VLDLC SERPL CALC-MCNC: 25 MG/DL
WBC # SPEC AUTO: 8.25 X10(3)/MCL (ref 4.5–11.5)

## 2023-08-24 PROCEDURE — 85025 COMPLETE CBC W/AUTO DIFF WBC: CPT

## 2023-08-24 PROCEDURE — 80053 COMPREHEN METABOLIC PANEL: CPT

## 2023-08-24 PROCEDURE — 84443 ASSAY THYROID STIM HORMONE: CPT

## 2023-08-24 PROCEDURE — 80061 LIPID PANEL: CPT

## 2023-08-24 PROCEDURE — 36415 COLL VENOUS BLD VENIPUNCTURE: CPT

## 2023-10-11 ENCOUNTER — OFFICE VISIT (OUTPATIENT)
Dept: ORTHOPEDICS | Facility: CLINIC | Age: 78
End: 2023-10-11
Payer: MEDICARE

## 2023-10-11 VITALS
HEIGHT: 67 IN | DIASTOLIC BLOOD PRESSURE: 81 MMHG | SYSTOLIC BLOOD PRESSURE: 157 MMHG | WEIGHT: 194 LBS | BODY MASS INDEX: 30.45 KG/M2 | HEART RATE: 70 BPM

## 2023-10-11 DIAGNOSIS — M17.11 PRIMARY OSTEOARTHRITIS OF RIGHT KNEE: Primary | ICD-10-CM

## 2023-10-11 PROCEDURE — 3079F DIAST BP 80-89 MM HG: CPT | Mod: CPTII,,, | Performed by: PHYSICIAN ASSISTANT

## 2023-10-11 PROCEDURE — 1159F PR MEDICATION LIST DOCUMENTED IN MEDICAL RECORD: ICD-10-PCS | Mod: CPTII,,, | Performed by: PHYSICIAN ASSISTANT

## 2023-10-11 PROCEDURE — 20610 DRAIN/INJ JOINT/BURSA W/O US: CPT | Mod: RT,,, | Performed by: PHYSICIAN ASSISTANT

## 2023-10-11 PROCEDURE — 1159F MED LIST DOCD IN RCRD: CPT | Mod: CPTII,,, | Performed by: PHYSICIAN ASSISTANT

## 2023-10-11 PROCEDURE — 3077F SYST BP >= 140 MM HG: CPT | Mod: CPTII,,, | Performed by: PHYSICIAN ASSISTANT

## 2023-10-11 PROCEDURE — 99214 PR OFFICE/OUTPT VISIT, EST, LEVL IV, 30-39 MIN: ICD-10-PCS | Mod: 25,,, | Performed by: PHYSICIAN ASSISTANT

## 2023-10-11 PROCEDURE — 1160F PR REVIEW ALL MEDS BY PRESCRIBER/CLIN PHARMACIST DOCUMENTED: ICD-10-PCS | Mod: CPTII,,, | Performed by: PHYSICIAN ASSISTANT

## 2023-10-11 PROCEDURE — 99214 OFFICE O/P EST MOD 30 MIN: CPT | Mod: 25,,, | Performed by: PHYSICIAN ASSISTANT

## 2023-10-11 PROCEDURE — 1160F RVW MEDS BY RX/DR IN RCRD: CPT | Mod: CPTII,,, | Performed by: PHYSICIAN ASSISTANT

## 2023-10-11 PROCEDURE — 3079F PR MOST RECENT DIASTOLIC BLOOD PRESSURE 80-89 MM HG: ICD-10-PCS | Mod: CPTII,,, | Performed by: PHYSICIAN ASSISTANT

## 2023-10-11 PROCEDURE — 20610 LARGE JOINT ASPIRATION/INJECTION: R KNEE: ICD-10-PCS | Mod: RT,,, | Performed by: PHYSICIAN ASSISTANT

## 2023-10-11 PROCEDURE — 3077F PR MOST RECENT SYSTOLIC BLOOD PRESSURE >= 140 MM HG: ICD-10-PCS | Mod: CPTII,,, | Performed by: PHYSICIAN ASSISTANT

## 2023-10-11 RX ORDER — BETAMETHASONE SODIUM PHOSPHATE AND BETAMETHASONE ACETATE 3; 3 MG/ML; MG/ML
12 INJECTION, SUSPENSION INTRA-ARTICULAR; INTRALESIONAL; INTRAMUSCULAR; SOFT TISSUE
Status: DISCONTINUED | OUTPATIENT
Start: 2023-10-11 | End: 2023-10-11 | Stop reason: HOSPADM

## 2023-10-11 RX ADMIN — BETAMETHASONE SODIUM PHOSPHATE AND BETAMETHASONE ACETATE 12 MG: 3; 3 INJECTION, SUSPENSION INTRA-ARTICULAR; INTRALESIONAL; INTRAMUSCULAR; SOFT TISSUE at 02:10

## 2023-10-11 NOTE — PROGRESS NOTES
Chief Complaint:   Chief Complaint   Patient presents with    Right Knee - Pain     Rt knee pain, pt states taking otc meds for pain relief, only takes meds when pain becomes unbearable, states radiates down to foot, states pain is like a sharp pain,        History of present illness:    78-year-old female presents office today for evaluation for right knee pain.  Her knee pain started bothering her couple weeks ago.  She does have a known history of osteoarthritis.  Her pain is more lateral-sided and does radiate down to the foot.  She describes the pain as sharp.  It is worse with weight-bearing and activity.  She denies any history of injury.  She has had cortisone injections in the past which have helped.  She does have a history of left total knee arthroplasty    Past Medical History:   Diagnosis Date    Calculus of kidney     Hyperlipemia     Hypertension        Past Surgical History:   Procedure Laterality Date    CATARACT EXTRACTION      EXTRACORPOREAL SHOCK WAVE LITHOTRIPSY Left 4/11/2023    Procedure: LITHOTRIPSY, ESWL Left  Renal Calculus  / Possible Bilateral Retrogrades;  Surgeon: Be Titus MD;  Location: [facility];  Service: Urology;  Laterality: Left;    KNEE SURGERY      TONSILLECTOMY      TUBAL LIGATION         Current Outpatient Medications   Medication Sig    ascorbic acid, vitamin C, (VITAMIN C) 500 MG tablet 1 tablet.    atorvastatin (LIPITOR) 10 MG tablet Take 10 mg by mouth.    busPIRone (BUSPAR) 7.5 MG tablet Take 7.5 mg by mouth.    cetirizine (ZYRTEC) 5 MG tablet Take 5 mg by mouth once daily.    co-enzyme Q-10 50 mg capsule Take 50 mg by mouth once daily.    dicyclomine (BENTYL) 20 mg tablet Take 20 mg by mouth 3 (three) times daily.    famotidine (PEPCID) 20 MG tablet Take 20 mg by mouth.    fluticasone (FLONASE) 50 mcg/actuation nasal spray 2 sprays by Each Nare route 2 (two) times daily.    hydrOXYzine HCL (ATARAX) 25 MG tablet Take 25 mg by mouth nightly as needed.     ketorolac (TORADOL) 10 mg tablet Take 1 tablet (10 mg total) by mouth every 8 (eight) hours as needed for Pain.    ketorolac 0.5% (ACULAR) 0.5 % Drop     magnesium oxide (MAG-OX) 400 mg (241.3 mg magnesium) tablet Take 400 mg by mouth once daily.    methylPREDNISolone (MEDROL DOSEPACK) 4 mg tablet Take by mouth.    milk thistle 175 mg tablet Take 175 mg by mouth once daily.    pantoprazole (PROTONIX) 40 MG tablet Take 40 mg by mouth.    prednisoLONE acetate (PRED FORTE) 1 % DrpS Place into the right eye.    predniSONE (DELTASONE) 10 MG tablet Take 1 tablet by mouth once daily.    tobramycin sulfate 0.3% (TOBREX) 0.3 % ophthalmic solution     vit C,E-Gx-ebiih-lutein-zeaxan (PRESERVISION AREDS-2) 250-90-40-1 mg Cap Take by mouth.    vitamin D (VITAMIN D3) 1000 units Tab Take 1,000 Units by mouth once daily.    zinc gluconate 50 mg tablet Take 50 mg by mouth once daily.    lorazepam (ATIVAN) 1 MG tablet Take 0.5 tablets (0.5 mg total) by mouth every 8 (eight) hours as needed for Anxiety.    tamsulosin (FLOMAX) 0.4 mg Cap Take 1 capsule (0.4 mg total) by mouth once daily. for 30 doses     No current facility-administered medications for this visit.       Review of patient's allergies indicates:   Allergen Reactions    Bactrim [sulfamethoxazole-trimethoprim] Nausea And Vomiting    Ezetimibe Itching    Levofloxacin Other (See Comments)    Lortab [hydrocodone-acetaminophen] Itching    Tylenol-codeine #3 [acetaminophen-codeine] Other (See Comments)       Family History   Problem Relation Age of Onset    Hypertension Mother     Hypertension Father     Hypertension Sister     Breast cancer Sister     Hypertension Brother     No Known Problems Maternal Aunt     No Known Problems Maternal Uncle     No Known Problems Paternal Aunt     No Known Problems Paternal Uncle     No Known Problems Maternal Grandmother     No Known Problems Maternal Grandfather     No Known Problems Paternal Grandmother     No Known Problems Paternal  "Grandfather     No Known Problems Other     Anesthesia problems Neg Hx     Broken bones Neg Hx     Cancer Neg Hx     Clotting disorder Neg Hx     Collagen disease Neg Hx     Diabetes Neg Hx     Dislocations Neg Hx     Osteoporosis Neg Hx     Rheumatologic disease Neg Hx     Scoliosis Neg Hx     Severe sprains Neg Hx        Social History     Socioeconomic History    Marital status:    Tobacco Use    Smoking status: Never    Smokeless tobacco: Never   Substance and Sexual Activity    Alcohol use: Never    Drug use: Never    Sexual activity: Yes     Partners: Male         Review of Systems:    Constitution:   Denies chills, fever, and sweats.  HENT:   Denies headaches or blurry vision.  Cardiovascular:  Denies chest pain or irregular heart beat.  Respiratory:   Denies cough or shortness of breath.  Gastrointestinal:  Denies abdominal pain, nausea, or vomiting.  Musculoskeletal:   Denies muscle cramps.  Neurological:   Denies dizziness or focal weakness.  Psychiatric/Behavior: Normal mental status.  Hematology/Lymph:  Denies bleeding problem or easy bruising/bleeding.  Skin:    Denies rash or suspicious lesions.    Examination:    Vital Signs:    Vitals:    10/11/23 1453   BP: (!) 157/81   Pulse: 70   Weight: 88 kg (194 lb)   Height: 5' 7" (1.702 m)       Body mass index is 30.38 kg/m².    Constitution:   Well-developed, well nourished patient in no acute distress.  Neurological:   Alert and oriented x 3 and cooperative to examination.     Psychiatric/Behavior: Normal mental status.  Respiratory:   No shortness of breath.  Nonlabored breathing  Cardiovascular:           Regular rate and rhythm  Eyes:    Extraoccular muscles intact  Skin:    No scars, rash or suspicious lesions.    Physical Exam:     Right Knee:     Grade 1 effusion    Valgus deformity    No erythema or increased heat Patella demonstrated crepitus.    Anterolateral aspect was tender on palpation. Lateral aspect was tender on palpation. Iliotibial " Band was tender on palpation.    No medial joint line pain   Active flexion 120 degrees   Active extension 5 degrees   antalgic gait was observed.     X-Ray Knee: A complete knee x-ray with standing 4 views was reviewed of the right knee      IMPRESSIONS RADIOLOGY TEST   Narrowing of the joint space bone on bone in lateral compartment, and osteophytes arising from the knee.    Kellgren Lamin grade 4 changes        Assessment:     Primary osteoarthritis of right knee  -     Large Joint Aspiration/Injection: R knee        Plan:      Recommend continued conservative treatment consisting of corticosteroid injection today as she did see significant pain relief last time.  We have discussed definitive treatment consisting of robotic assisted right total knee arthroplasty.  I will make her a 3-4 month follow-up with Dr. Reynoso for further surgical discussions versus repeat cortisone injection at that time        Follow up in about 3 months (around 1/11/2024).    DISCLAIMER: This note may have been dictated using voice recognition software and may contain grammatical errors.

## 2023-10-11 NOTE — PROCEDURES
Large Joint Aspiration/Injection: R knee    Date/Time: 10/11/2023 2:45 PM    Performed by: Wisam Macias PA  Authorized by: Wisam Macias PA    Consent Done?:  Yes (Verbal)  Indications:  Arthritis  Timeout: prior to procedure the correct patient, procedure, and site was verified    Prep: patient was prepped and draped in usual sterile fashion      Local anesthesia used?: Yes    Local anesthetic:  Lidocaine 2% without epinephrine    Details:  Needle Size:  25 G  Ultrasonic Guidance for needle placement?: No    Location:  Knee  Site:  R knee  Medications:  12 mg betamethasone acetate-betamethasone sodium phosphate 6 mg/mL  Patient tolerance:  Patient tolerated the procedure well with no immediate complications

## 2023-11-02 ENCOUNTER — TELEPHONE (OUTPATIENT)
Dept: ORTHOPEDICS | Facility: CLINIC | Age: 78
End: 2023-11-02
Payer: MEDICARE

## 2023-11-02 NOTE — TELEPHONE ENCOUNTER
Patient called stating that the cortisone injections that were done on 10/11/2023, didn't work and wants to talk about her options.     I tried to call the patient back 3 times but the number is not a working number.

## 2024-01-22 ENCOUNTER — OFFICE VISIT (OUTPATIENT)
Dept: ORTHOPEDICS | Facility: CLINIC | Age: 79
End: 2024-01-22
Payer: MEDICARE

## 2024-01-22 ENCOUNTER — HOSPITAL ENCOUNTER (OUTPATIENT)
Dept: RADIOLOGY | Facility: CLINIC | Age: 79
Discharge: HOME | End: 2024-01-22
Attending: SPECIALIST
Payer: MEDICARE

## 2024-01-22 VITALS
WEIGHT: 191 LBS | BODY MASS INDEX: 29.98 KG/M2 | HEART RATE: 68 BPM | SYSTOLIC BLOOD PRESSURE: 145 MMHG | DIASTOLIC BLOOD PRESSURE: 71 MMHG | HEIGHT: 67 IN

## 2024-01-22 DIAGNOSIS — M17.11 PRIMARY OSTEOARTHRITIS OF RIGHT KNEE: Primary | ICD-10-CM

## 2024-01-22 DIAGNOSIS — M17.11 PRIMARY OSTEOARTHRITIS OF RIGHT KNEE: ICD-10-CM

## 2024-01-22 PROCEDURE — 73564 X-RAY EXAM KNEE 4 OR MORE: CPT | Mod: RT,,, | Performed by: SPECIALIST

## 2024-01-22 PROCEDURE — 3077F SYST BP >= 140 MM HG: CPT | Mod: CPTII,,, | Performed by: SPECIALIST

## 2024-01-22 PROCEDURE — 3078F DIAST BP <80 MM HG: CPT | Mod: CPTII,,, | Performed by: SPECIALIST

## 2024-01-22 PROCEDURE — 1101F PT FALLS ASSESS-DOCD LE1/YR: CPT | Mod: CPTII,,, | Performed by: SPECIALIST

## 2024-01-22 PROCEDURE — 1160F RVW MEDS BY RX/DR IN RCRD: CPT | Mod: CPTII,,, | Performed by: SPECIALIST

## 2024-01-22 PROCEDURE — 99213 OFFICE O/P EST LOW 20 MIN: CPT | Mod: ,,, | Performed by: SPECIALIST

## 2024-01-22 PROCEDURE — 1159F MED LIST DOCD IN RCRD: CPT | Mod: CPTII,,, | Performed by: SPECIALIST

## 2024-01-22 PROCEDURE — 3288F FALL RISK ASSESSMENT DOCD: CPT | Mod: CPTII,,, | Performed by: SPECIALIST

## 2024-01-22 PROCEDURE — 1125F AMNT PAIN NOTED PAIN PRSNT: CPT | Mod: CPTII,,, | Performed by: SPECIALIST

## 2024-01-22 NOTE — LETTER
Date: 2024  Re: Jeana Muhammad     : 1945  Dr. Emmanuel,    The above named patient is scheduled to have a Robotic Assisted Right Total Knee Arthroplasty on 2024.    *Type of Anesthesia: Spinal    This patient needs surgical clearance from your office for this procedure. Please perform necessary tests in order for this patient to be medically cleared for surgery. Please send or fax the clearance letter with most recent ECHO, EKG and stress test, to our office as soon as possible. Our fax number is [phone].    We appreciate your help in getting our patient cleared for surgery. Please feel free to call our office should you have any questions, [phone].        Thank you,    MONICA Reynoso MD   /thao

## 2024-01-22 NOTE — PROGRESS NOTES
Chief Complaint:   Chief Complaint   Patient presents with    Follow-up     3 month f/u Rt knee cortisone injection. States that the injection didn't help much. States that she would like to discuss her options. Complaints of balance issues with the right knee.        History of present illness:    78-year-old female presents office today for evaluation for right knee pain.  Her knee pain started bothering her many months ago.  She does have a known history of osteoarthritis.  Her pain is more lateral-sided and does radiate down to the foot.  She describes the pain as sharp.  It is worse with weight-bearing and activity.  She denies any history of injury.  She has had cortisone injections in the past which have helped but no longer provide relief.  She does have a history of left total knee arthroplasty    Past Medical History:   Diagnosis Date    Calculus of kidney     Hyperlipemia     Hypertension        Past Surgical History:   Procedure Laterality Date    CATARACT EXTRACTION      EXTRACORPOREAL SHOCK WAVE LITHOTRIPSY Left 4/11/2023    Procedure: LITHOTRIPSY, ESWL Left  Renal Calculus  / Possible Bilateral Retrogrades;  Surgeon: Be Titus MD;  Location: [facility];  Service: Urology;  Laterality: Left;    KNEE SURGERY      TONSILLECTOMY      TUBAL LIGATION         Current Outpatient Medications   Medication Sig    ascorbic acid, vitamin C, (VITAMIN C) 500 MG tablet 1 tablet.    atorvastatin (LIPITOR) 10 MG tablet Take 10 mg by mouth.    busPIRone (BUSPAR) 7.5 MG tablet Take 7.5 mg by mouth.    cetirizine (ZYRTEC) 5 MG tablet Take 5 mg by mouth once daily.    co-enzyme Q-10 50 mg capsule Take 50 mg by mouth once daily.    dicyclomine (BENTYL) 20 mg tablet Take 20 mg by mouth 3 (three) times daily.    famotidine (PEPCID) 20 MG tablet Take 20 mg by mouth.    fluticasone (FLONASE) 50 mcg/actuation nasal spray 2 sprays by Each Nare route 2 (two) times daily.    hydrOXYzine HCL (ATARAX) 25 MG tablet Take 25 mg  by mouth nightly as needed.    ketorolac (TORADOL) 10 mg tablet Take 1 tablet (10 mg total) by mouth every 8 (eight) hours as needed for Pain.    ketorolac 0.5% (ACULAR) 0.5 % Drop     magnesium oxide (MAG-OX) 400 mg (241.3 mg magnesium) tablet Take 400 mg by mouth once daily.    methylPREDNISolone (MEDROL DOSEPACK) 4 mg tablet Take by mouth.    milk thistle 175 mg tablet Take 175 mg by mouth once daily.    pantoprazole (PROTONIX) 40 MG tablet Take 40 mg by mouth.    prednisoLONE acetate (PRED FORTE) 1 % DrpS Place into the right eye.    predniSONE (DELTASONE) 10 MG tablet Take 1 tablet by mouth once daily.    tobramycin sulfate 0.3% (TOBREX) 0.3 % ophthalmic solution     vit C,A-Fd-yzrpl-lutein-zeaxan (PRESERVISION AREDS-2) 250-90-40-1 mg Cap Take by mouth.    vitamin D (VITAMIN D3) 1000 units Tab Take 1,000 Units by mouth once daily.    zinc gluconate 50 mg tablet Take 50 mg by mouth once daily.    lorazepam (ATIVAN) 1 MG tablet Take 0.5 tablets (0.5 mg total) by mouth every 8 (eight) hours as needed for Anxiety.    tamsulosin (FLOMAX) 0.4 mg Cap Take 1 capsule (0.4 mg total) by mouth once daily. for 30 doses     No current facility-administered medications for this visit.       Review of patient's allergies indicates:   Allergen Reactions    Bactrim [sulfamethoxazole-trimethoprim] Nausea And Vomiting    Ezetimibe Itching    Levofloxacin Other (See Comments)    Lortab [hydrocodone-acetaminophen] Itching    Tylenol-codeine #3 [acetaminophen-codeine] Other (See Comments)       Family History   Problem Relation Age of Onset    Hypertension Mother     Hypertension Father     Hypertension Sister     Breast cancer Sister     Hypertension Brother     No Known Problems Maternal Aunt     No Known Problems Maternal Uncle     No Known Problems Paternal Aunt     No Known Problems Paternal Uncle     No Known Problems Maternal Grandmother     No Known Problems Maternal Grandfather     No Known Problems Paternal Grandmother      "No Known Problems Paternal Grandfather     No Known Problems Other     Anesthesia problems Neg Hx     Broken bones Neg Hx     Cancer Neg Hx     Clotting disorder Neg Hx     Collagen disease Neg Hx     Diabetes Neg Hx     Dislocations Neg Hx     Osteoporosis Neg Hx     Rheumatologic disease Neg Hx     Scoliosis Neg Hx     Severe sprains Neg Hx        Social History     Socioeconomic History    Marital status:    Tobacco Use    Smoking status: Never    Smokeless tobacco: Never   Substance and Sexual Activity    Alcohol use: Never    Drug use: Never    Sexual activity: Yes     Partners: Male         Review of Systems:    Constitution:   Denies chills, fever, and sweats.  HENT:   Denies headaches or blurry vision.  Cardiovascular:  Denies chest pain or irregular heart beat.  Respiratory:   Denies cough or shortness of breath.  Gastrointestinal:  Denies abdominal pain, nausea, or vomiting.  Musculoskeletal:   Denies muscle cramps.  Neurological:   Denies dizziness or focal weakness.  Psychiatric/Behavior: Normal mental status.  Hematology/Lymph:  Denies bleeding problem or easy bruising/bleeding.  Skin:    Denies rash or suspicious lesions.    Examination:    Vital Signs:    Vitals:    01/22/24 1106   BP: (!) 145/71   Pulse: 68   Weight: 86.6 kg (191 lb)   Height: 5' 7" (1.702 m)   PainSc:   7       Body mass index is 29.91 kg/m².    Constitution:   Well-developed, well nourished patient in no acute distress.  Neurological:   Alert and oriented x 3 and cooperative to examination.     Psychiatric/Behavior: Normal mental status.  Respiratory:   No shortness of breath.  Nonlabored breathing  Cardiovascular:           Regular rate and rhythm  Eyes:    Extraoccular muscles intact  Skin:    No scars, rash or suspicious lesions.    Physical Exam:     Right Knee:     Grade 1 effusion    Valgus deformity    No erythema or increased heat Patella demonstrated crepitus.    Anterolateral aspect was tender on palpation. Lateral " aspect was tender on palpation. Iliotibial Band was tender on palpation.    No medial joint line pain   Active flexion 120 degrees   Active extension 5 degrees   antalgic gait was observed.     X-Ray Knee: A complete knee x-ray with standing 4 views was reviewed of the right knee      IMPRESSIONS RADIOLOGY TEST   Narrowing of the joint space bone on bone in lateral compartment, and osteophytes arising from the knee.    Kellgren Lamin grade 4 changes        Assessment:     Primary osteoarthritis of right knee  -     X-Ray Knee Complete 4 Or More Views Right; Future; Expected date: 01/22/2024        Plan:      Robotic assisted right total knee arthroplasty  We will use ASA post -operatively for DVT prophylaxis    The proposed procedure as well as associated risks/benefits were discussed at length with the patient and family with risks to include pain, bleeding, infection, future surgeries, neurovascular compromise, loss of limb, heart attack, stroke, deep vein thrombosis, and even death. They understand and agree with the treatment plan.        No follow-ups on file.    DISCLAIMER: This note may have been dictated using voice recognition software and may contain grammatical errors.

## 2024-02-22 RX ORDER — AMOXICILLIN AND CLAVULANATE POTASSIUM 875; 125 MG/1; MG/1
1 TABLET, FILM COATED ORAL 2 TIMES DAILY
Status: ON HOLD | COMMUNITY
End: 2024-03-08 | Stop reason: HOSPADM

## 2024-02-23 ENCOUNTER — HOSPITAL ENCOUNTER (OUTPATIENT)
Dept: RADIOLOGY | Facility: HOSPITAL | Age: 79
Discharge: HOME | End: 2024-02-23
Attending: PHYSICIAN ASSISTANT
Payer: MEDICARE

## 2024-02-23 ENCOUNTER — OFFICE VISIT (OUTPATIENT)
Dept: ORTHOPEDICS | Facility: CLINIC | Age: 79
End: 2024-02-23
Payer: MEDICARE

## 2024-02-23 VITALS
HEART RATE: 65 BPM | DIASTOLIC BLOOD PRESSURE: 68 MMHG | BODY MASS INDEX: 30.01 KG/M2 | WEIGHT: 191.19 LBS | SYSTOLIC BLOOD PRESSURE: 134 MMHG | HEIGHT: 67 IN

## 2024-02-23 DIAGNOSIS — Z01.812 PRE-OPERATIVE LABORATORY EXAMINATION: ICD-10-CM

## 2024-02-23 DIAGNOSIS — R79.9 ABNORMAL BLOOD CHEMISTRY: ICD-10-CM

## 2024-02-23 DIAGNOSIS — R26.89 IMPAIRED GAIT AND MOBILITY: ICD-10-CM

## 2024-02-23 DIAGNOSIS — I10 HYPERTENSION, UNSPECIFIED TYPE: ICD-10-CM

## 2024-02-23 DIAGNOSIS — M17.11 PRIMARY OSTEOARTHRITIS OF RIGHT KNEE: ICD-10-CM

## 2024-02-23 DIAGNOSIS — M17.11 PRIMARY OSTEOARTHRITIS OF RIGHT KNEE: Primary | ICD-10-CM

## 2024-02-23 LAB — MRSA PCR SCRN (OHS): NOT DETECTED

## 2024-02-23 PROCEDURE — 1160F RVW MEDS BY RX/DR IN RCRD: CPT | Mod: CPTII,,, | Performed by: PHYSICIAN ASSISTANT

## 2024-02-23 PROCEDURE — 99213 OFFICE O/P EST LOW 20 MIN: CPT | Mod: ,,, | Performed by: PHYSICIAN ASSISTANT

## 2024-02-23 PROCEDURE — 71046 X-RAY EXAM CHEST 2 VIEWS: CPT | Mod: TC

## 2024-02-23 PROCEDURE — 3078F DIAST BP <80 MM HG: CPT | Mod: CPTII,,, | Performed by: PHYSICIAN ASSISTANT

## 2024-02-23 PROCEDURE — 3288F FALL RISK ASSESSMENT DOCD: CPT | Mod: CPTII,,, | Performed by: PHYSICIAN ASSISTANT

## 2024-02-23 PROCEDURE — 1101F PT FALLS ASSESS-DOCD LE1/YR: CPT | Mod: CPTII,,, | Performed by: PHYSICIAN ASSISTANT

## 2024-02-23 PROCEDURE — 1159F MED LIST DOCD IN RCRD: CPT | Mod: CPTII,,, | Performed by: PHYSICIAN ASSISTANT

## 2024-02-23 PROCEDURE — 3075F SYST BP GE 130 - 139MM HG: CPT | Mod: CPTII,,, | Performed by: PHYSICIAN ASSISTANT

## 2024-02-23 PROCEDURE — 73700 CT LOWER EXTREMITY W/O DYE: CPT | Mod: TC,RT

## 2024-02-23 RX ORDER — ACETAMINOPHEN 500 MG
1000 TABLET ORAL
Status: CANCELLED | OUTPATIENT
Start: 2024-02-23

## 2024-02-23 RX ORDER — SODIUM CHLORIDE, SODIUM GLUCONATE, SODIUM ACETATE, POTASSIUM CHLORIDE AND MAGNESIUM CHLORIDE 30; 37; 368; 526; 502 MG/100ML; MG/100ML; MG/100ML; MG/100ML; MG/100ML
INJECTION, SOLUTION INTRAVENOUS CONTINUOUS
Status: CANCELLED | OUTPATIENT
Start: 2024-02-23

## 2024-02-23 RX ORDER — GABAPENTIN 100 MG/1
300 CAPSULE ORAL
Status: CANCELLED | OUTPATIENT
Start: 2024-02-23

## 2024-02-23 RX ORDER — KETOROLAC TROMETHAMINE 10 MG/1
10 TABLET, FILM COATED ORAL
Status: CANCELLED | OUTPATIENT
Start: 2024-02-23 | End: 2024-02-23

## 2024-02-23 RX ORDER — TRANEXAMIC ACID 650 MG/1
1950 TABLET ORAL
Status: CANCELLED | OUTPATIENT
Start: 2024-02-23 | End: 2024-02-23

## 2024-02-23 RX ORDER — ONDANSETRON 4 MG/1
4 TABLET, ORALLY DISINTEGRATING ORAL
Status: CANCELLED | OUTPATIENT
Start: 2024-02-23

## 2024-02-23 RX ORDER — SCOLOPAMINE TRANSDERMAL SYSTEM 1 MG/1
1 PATCH, EXTENDED RELEASE TRANSDERMAL ONCE AS NEEDED
Status: CANCELLED | OUTPATIENT
Start: 2024-02-23 | End: 2035-07-22

## 2024-02-23 NOTE — H&P
Chief Complaint:   Chief Complaint   Patient presents with    Right Knee - Pre-op Exam     Pre- Op for Right TKA on 3/07/24. Confirmed Right knee and wants the sx       History of present illness:    78-year-old female presents office today for preop evaluation for robotic assisted right total knee arthroplasty on March 7th.  No new complaints or concerns today.  Her knee pain started bothering her many months ago.  She does have a known history of osteoarthritis.  Her pain is more lateral-sided and does radiate down to the foot.  She describes the pain as sharp.  It is worse with weight-bearing and activity.  She denies any history of injury.  She has had cortisone injections in the past which have helped but no longer provide relief.  She does have a history of left total knee arthroplasty    Past Medical History:   Diagnosis Date    Arthritis     Calculus of kidney     Digestive disorder     Hyperlipemia     Hypertension     Liver disease     fatty liver and cyst on liver    Seasonal allergies        Past Surgical History:   Procedure Laterality Date    CATARACT EXTRACTION W/  INTRAOCULAR LENS IMPLANT Bilateral     COLONOSCOPY      EXTRACORPOREAL SHOCK WAVE LITHOTRIPSY Left 04/11/2023    Procedure: LITHOTRIPSY, ESWL Left  Renal Calculus  / Possible Bilateral Retrogrades;  Surgeon: Be Titus MD;  Location: [facility];  Service: Urology;  Laterality: Left;    TONSILLECTOMY      TOTAL KNEE ARTHROPLASTY Left     TUBAL LIGATION         Current Outpatient Medications   Medication Sig    amoxicillin-clavulanate 875-125mg (AUGMENTIN) 875-125 mg per tablet Take 1 tablet by mouth 2 (two) times daily.    ascorbic acid, vitamin C, (VITAMIN C) 500 MG tablet Take 500 mg by mouth once daily.    atorvastatin (LIPITOR) 10 MG tablet Take 5 mg by mouth every evening.    busPIRone (BUSPAR) 7.5 MG tablet Take 7.5 mg by mouth as needed.    cetirizine (ZYRTEC) 5 MG tablet Take 5 mg by mouth once daily.    co-enzyme Q-10 50 mg  capsule Take 50 mg by mouth once daily.    dicyclomine (BENTYL) 20 mg tablet Take 20 mg by mouth as needed.    famotidine (PEPCID) 20 MG tablet Take 20 mg by mouth as needed.    fluticasone (FLONASE) 50 mcg/actuation nasal spray 2 sprays by Each Nare route 2 (two) times daily. (Patient taking differently: 2 sprays by Each Nostril route as needed.)    hydrOXYzine HCL (ATARAX) 25 MG tablet Take 25 mg by mouth nightly as needed.    ketorolac (TORADOL) 10 mg tablet Take 1 tablet (10 mg total) by mouth every 8 (eight) hours as needed for Pain.    ketorolac 0.5% (ACULAR) 0.5 % Drop     magnesium oxide (MAG-OX) 400 mg (241.3 mg magnesium) tablet Take 400 mg by mouth once daily.    methylPREDNISolone (MEDROL DOSEPACK) 4 mg tablet Take by mouth.    milk thistle 175 mg tablet Take 175 mg by mouth once daily.    pantoprazole (PROTONIX) 40 MG tablet Take 40 mg by mouth daily as needed.    prednisoLONE acetate (PRED FORTE) 1 % DrpS Place into the right eye.    predniSONE (DELTASONE) 10 MG tablet Take 1 tablet by mouth once daily.    tobramycin sulfate 0.3% (TOBREX) 0.3 % ophthalmic solution     vit C,A-Yt-vfhmw-lutein-zeaxan (PRESERVISION AREDS-2) 250-90-40-1 mg Cap Take 1 tablet by mouth Daily.    vitamin D (VITAMIN D3) 1000 units Tab Take 1,000 Units by mouth once daily.    zinc gluconate 50 mg tablet Take 50 mg by mouth once daily.    lorazepam (ATIVAN) 1 MG tablet Take 0.5 tablets (0.5 mg total) by mouth every 8 (eight) hours as needed for Anxiety.    tamsulosin (FLOMAX) 0.4 mg Cap Take 1 capsule (0.4 mg total) by mouth once daily. for 30 doses     No current facility-administered medications for this visit.       Review of patient's allergies indicates:   Allergen Reactions    Bactrim [sulfamethoxazole-trimethoprim] Nausea And Vomiting    Ezetimibe Itching    Levofloxacin Other (See Comments)    Lortab [hydrocodone-acetaminophen] Itching    Tylenol-codeine #3 [acetaminophen-codeine] Other (See Comments)       Family History  "  Problem Relation Age of Onset    Hypertension Mother     Hypertension Father     Hypertension Sister     Breast cancer Sister     Hypertension Brother     No Known Problems Maternal Aunt     No Known Problems Maternal Uncle     No Known Problems Paternal Aunt     No Known Problems Paternal Uncle     No Known Problems Maternal Grandmother     No Known Problems Maternal Grandfather     No Known Problems Paternal Grandmother     No Known Problems Paternal Grandfather     No Known Problems Other     Anesthesia problems Neg Hx     Broken bones Neg Hx     Cancer Neg Hx     Clotting disorder Neg Hx     Collagen disease Neg Hx     Diabetes Neg Hx     Dislocations Neg Hx     Osteoporosis Neg Hx     Rheumatologic disease Neg Hx     Scoliosis Neg Hx     Severe sprains Neg Hx        Social History     Socioeconomic History    Marital status:    Tobacco Use    Smoking status: Never    Smokeless tobacco: Never   Substance and Sexual Activity    Alcohol use: Never    Drug use: Never    Sexual activity: Yes     Partners: Male         Review of Systems:    Constitution:   Denies chills, fever, and sweats.  HENT:   Denies headaches or blurry vision.  Cardiovascular:  Denies chest pain or irregular heart beat.  Respiratory:   Denies cough or shortness of breath.  Gastrointestinal:  Denies abdominal pain, nausea, or vomiting.  Musculoskeletal:   Denies muscle cramps.  Neurological:   Denies dizziness or focal weakness.  Psychiatric/Behavior: Normal mental status.  Hematology/Lymph:  Denies bleeding problem or easy bruising/bleeding.  Skin:    Denies rash or suspicious lesions.    Examination:    Vital Signs:    Vitals:    02/23/24 1027   BP: 134/68   Pulse: 65   Weight: 86.7 kg (191 lb 3.2 oz)   Height: 5' 7.01" (1.702 m)       Body mass index is 29.94 kg/m².    Constitution:   Well-developed, well nourished patient in no acute distress.  Neurological:   Alert and oriented x 3 and cooperative to examination.   "   Psychiatric/Behavior: Normal mental status.  Respiratory:   No shortness of breath.  Nonlabored breathing  Cardiovascular:           Regular rate and rhythm  Eyes:    Extraoccular muscles intact  Skin:    No scars, rash or suspicious lesions.    Physical Exam:     Right Knee:     Grade 1 effusion    Valgus deformity    No erythema or increased heat Patella demonstrated crepitus.    Anterolateral aspect was tender on palpation. Lateral aspect was tender on palpation. Iliotibial Band was tender on palpation.    No medial joint line pain   Active flexion 120 degrees   Active extension 5 degrees   antalgic gait was observed.     X-Ray Knee: A complete knee x-ray with standing 4 views was reviewed of the right knee      IMPRESSIONS RADIOLOGY TEST   Narrowing of the joint space bone on bone in lateral compartment, and osteophytes arising from the knee.    Kellgren Lamin grade 4 changes        Assessment:     Primary osteoarthritis of right knee    Hypertension, unspecified type    Impaired gait and mobility    Pre-operative laboratory examination        Plan:      Robotic assisted right total knee arthroplasty  We will use ASA post -operatively for DVT prophylaxis    The proposed procedure as well as associated risks/benefits were discussed at length with the patient and family with risks to include pain, bleeding, infection, future surgeries, neurovascular compromise, loss of limb, heart attack, stroke, deep vein thrombosis, and even death. They understand and agree with the treatment plan.        No follow-ups on file.    DISCLAIMER: This note may have been dictated using voice recognition software and may contain grammatical errors.

## 2024-02-23 NOTE — H&P (VIEW-ONLY)
Chief Complaint:   Chief Complaint   Patient presents with    Right Knee - Pre-op Exam     Pre- Op for Right TKA on 3/07/24. Confirmed Right knee and wants the sx       History of present illness:    78-year-old female presents office today for preop evaluation for robotic assisted right total knee arthroplasty on March 7th.  No new complaints or concerns today.  Her knee pain started bothering her many months ago.  She does have a known history of osteoarthritis.  Her pain is more lateral-sided and does radiate down to the foot.  She describes the pain as sharp.  It is worse with weight-bearing and activity.  She denies any history of injury.  She has had cortisone injections in the past which have helped but no longer provide relief.  She does have a history of left total knee arthroplasty    Past Medical History:   Diagnosis Date    Arthritis     Calculus of kidney     Digestive disorder     Hyperlipemia     Hypertension     Liver disease     fatty liver and cyst on liver    Seasonal allergies        Past Surgical History:   Procedure Laterality Date    CATARACT EXTRACTION W/  INTRAOCULAR LENS IMPLANT Bilateral     COLONOSCOPY      EXTRACORPOREAL SHOCK WAVE LITHOTRIPSY Left 04/11/2023    Procedure: LITHOTRIPSY, ESWL Left  Renal Calculus  / Possible Bilateral Retrogrades;  Surgeon: Be Titus MD;  [facility];  Service: Urology;  Laterality: Left;    TONSILLECTOMY      TOTAL KNEE ARTHROPLASTY Left     TUBAL LIGATION         Current Outpatient Medications   Medication Sig    amoxicillin-clavulanate 875-125mg (AUGMENTIN) 875-125 mg per tablet Take 1 tablet by mouth 2 (two) times daily.    ascorbic acid, vitamin C, (VITAMIN C) 500 MG tablet Take 500 mg by mouth once daily.    atorvastatin (LIPITOR) 10 MG tablet Take 5 mg by mouth every evening.    busPIRone (BUSPAR) 7.5 MG tablet Take 7.5 mg by mouth as needed.    cetirizine (ZYRTEC) 5 MG tablet Take 5 mg by mouth once daily.    co-enzyme Q-10 50 mg  capsule Take 50 mg by mouth once daily.    dicyclomine (BENTYL) 20 mg tablet Take 20 mg by mouth as needed.    famotidine (PEPCID) 20 MG tablet Take 20 mg by mouth as needed.    fluticasone (FLONASE) 50 mcg/actuation nasal spray 2 sprays by Each Nare route 2 (two) times daily. (Patient taking differently: 2 sprays by Each Nostril route as needed.)    hydrOXYzine HCL (ATARAX) 25 MG tablet Take 25 mg by mouth nightly as needed.    ketorolac (TORADOL) 10 mg tablet Take 1 tablet (10 mg total) by mouth every 8 (eight) hours as needed for Pain.    ketorolac 0.5% (ACULAR) 0.5 % Drop     magnesium oxide (MAG-OX) 400 mg (241.3 mg magnesium) tablet Take 400 mg by mouth once daily.    methylPREDNISolone (MEDROL DOSEPACK) 4 mg tablet Take by mouth.    milk thistle 175 mg tablet Take 175 mg by mouth once daily.    pantoprazole (PROTONIX) 40 MG tablet Take 40 mg by mouth daily as needed.    prednisoLONE acetate (PRED FORTE) 1 % DrpS Place into the right eye.    predniSONE (DELTASONE) 10 MG tablet Take 1 tablet by mouth once daily.    tobramycin sulfate 0.3% (TOBREX) 0.3 % ophthalmic solution     vit C,H-Fp-vjijs-lutein-zeaxan (PRESERVISION AREDS-2) 250-90-40-1 mg Cap Take 1 tablet by mouth Daily.    vitamin D (VITAMIN D3) 1000 units Tab Take 1,000 Units by mouth once daily.    zinc gluconate 50 mg tablet Take 50 mg by mouth once daily.    lorazepam (ATIVAN) 1 MG tablet Take 0.5 tablets (0.5 mg total) by mouth every 8 (eight) hours as needed for Anxiety.    tamsulosin (FLOMAX) 0.4 mg Cap Take 1 capsule (0.4 mg total) by mouth once daily. for 30 doses     No current facility-administered medications for this visit.       Review of patient's allergies indicates:   Allergen Reactions    Bactrim [sulfamethoxazole-trimethoprim] Nausea And Vomiting    Ezetimibe Itching    Levofloxacin Other (See Comments)    Lortab [hydrocodone-acetaminophen] Itching    Tylenol-codeine #3 [acetaminophen-codeine] Other (See Comments)       Family History  "  Problem Relation Age of Onset    Hypertension Mother     Hypertension Father     Hypertension Sister     Breast cancer Sister     Hypertension Brother     No Known Problems Maternal Aunt     No Known Problems Maternal Uncle     No Known Problems Paternal Aunt     No Known Problems Paternal Uncle     No Known Problems Maternal Grandmother     No Known Problems Maternal Grandfather     No Known Problems Paternal Grandmother     No Known Problems Paternal Grandfather     No Known Problems Other     Anesthesia problems Neg Hx     Broken bones Neg Hx     Cancer Neg Hx     Clotting disorder Neg Hx     Collagen disease Neg Hx     Diabetes Neg Hx     Dislocations Neg Hx     Osteoporosis Neg Hx     Rheumatologic disease Neg Hx     Scoliosis Neg Hx     Severe sprains Neg Hx        Social History     Socioeconomic History    Marital status:    Tobacco Use    Smoking status: Never    Smokeless tobacco: Never   Substance and Sexual Activity    Alcohol use: Never    Drug use: Never    Sexual activity: Yes     Partners: Male         Review of Systems:    Constitution:   Denies chills, fever, and sweats.  HENT:   Denies headaches or blurry vision.  Cardiovascular:  Denies chest pain or irregular heart beat.  Respiratory:   Denies cough or shortness of breath.  Gastrointestinal:  Denies abdominal pain, nausea, or vomiting.  Musculoskeletal:   Denies muscle cramps.  Neurological:   Denies dizziness or focal weakness.  Psychiatric/Behavior: Normal mental status.  Hematology/Lymph:  Denies bleeding problem or easy bruising/bleeding.  Skin:    Denies rash or suspicious lesions.    Examination:    Vital Signs:    Vitals:    02/23/24 1027   BP: 134/68   Pulse: 65   Weight: 86.7 kg (191 lb 3.2 oz)   Height: 5' 7.01" (1.702 m)       Body mass index is 29.94 kg/m².    Constitution:   Well-developed, well nourished patient in no acute distress.  Neurological:   Alert and oriented x 3 and cooperative to examination.   "   Psychiatric/Behavior: Normal mental status.  Respiratory:   No shortness of breath.  Nonlabored breathing  Cardiovascular:           Regular rate and rhythm  Eyes:    Extraoccular muscles intact  Skin:    No scars, rash or suspicious lesions.    Physical Exam:     Right Knee:     Grade 1 effusion    Valgus deformity    No erythema or increased heat Patella demonstrated crepitus.    Anterolateral aspect was tender on palpation. Lateral aspect was tender on palpation. Iliotibial Band was tender on palpation.    No medial joint line pain   Active flexion 120 degrees   Active extension 5 degrees   antalgic gait was observed.     X-Ray Knee: A complete knee x-ray with standing 4 views was reviewed of the right knee      IMPRESSIONS RADIOLOGY TEST   Narrowing of the joint space bone on bone in lateral compartment, and osteophytes arising from the knee.    Kellgren Lamin grade 4 changes        Assessment:     Primary osteoarthritis of right knee    Hypertension, unspecified type    Impaired gait and mobility    Pre-operative laboratory examination        Plan:      Robotic assisted right total knee arthroplasty  We will use ASA post -operatively for DVT prophylaxis    The proposed procedure as well as associated risks/benefits were discussed at length with the patient and family with risks to include pain, bleeding, infection, future surgeries, neurovascular compromise, loss of limb, heart attack, stroke, deep vein thrombosis, and even death. They understand and agree with the treatment plan.        No follow-ups on file.    DISCLAIMER: This note may have been dictated using voice recognition software and may contain grammatical errors.

## 2024-02-28 ENCOUNTER — ANESTHESIA EVENT (OUTPATIENT)
Dept: SURGERY | Facility: HOSPITAL | Age: 79
End: 2024-02-28
Payer: MEDICARE

## 2024-03-05 DIAGNOSIS — M17.11 PRIMARY OSTEOARTHRITIS OF RIGHT KNEE: Primary | ICD-10-CM

## 2024-03-07 ENCOUNTER — ANESTHESIA (OUTPATIENT)
Dept: SURGERY | Facility: HOSPITAL | Age: 79
End: 2024-03-07
Payer: MEDICARE

## 2024-03-07 ENCOUNTER — HOSPITAL ENCOUNTER (OUTPATIENT)
Facility: HOSPITAL | Age: 79
LOS: 1 days | Discharge: HOME | End: 2024-03-09
Attending: SPECIALIST | Admitting: SPECIALIST
Payer: MEDICARE

## 2024-03-07 DIAGNOSIS — M17.11 PRIMARY OSTEOARTHRITIS OF RIGHT KNEE: ICD-10-CM

## 2024-03-07 DIAGNOSIS — R79.9 ABNORMAL BLOOD CHEMISTRY: ICD-10-CM

## 2024-03-07 DIAGNOSIS — I10 HYPERTENSION, UNSPECIFIED TYPE: ICD-10-CM

## 2024-03-07 DIAGNOSIS — Z01.812 PRE-OPERATIVE LABORATORY EXAMINATION: ICD-10-CM

## 2024-03-07 DIAGNOSIS — R26.89 IMPAIRED GAIT AND MOBILITY: ICD-10-CM

## 2024-03-07 PROBLEM — I25.10 ARTERIOSCLEROSIS OF CORONARY ARTERY: Status: ACTIVE | Noted: 2024-03-07

## 2024-03-07 PROBLEM — E66.9 OBESITY: Status: ACTIVE | Noted: 2024-03-07

## 2024-03-07 PROBLEM — H54.7 VISUAL IMPAIRMENT: Status: ACTIVE | Noted: 2024-03-07

## 2024-03-07 LAB
GLUCOSE SERPL-MCNC: 92 MG/DL (ref 70–110)
HCT VFR BLD AUTO: 35 % (ref 37–47)
HGB BLD-MCNC: 11.4 G/DL (ref 12–16)
POCT GLUCOSE: 92 MG/DL (ref 70–110)

## 2024-03-07 PROCEDURE — 37000009 HC ANESTHESIA EA ADD 15 MINS: Performed by: SPECIALIST

## 2024-03-07 PROCEDURE — 25000003 PHARM REV CODE 250: Performed by: STUDENT IN AN ORGANIZED HEALTH CARE EDUCATION/TRAINING PROGRAM

## 2024-03-07 PROCEDURE — D9220A PRA ANESTHESIA: Mod: ANES,,, | Performed by: ANESTHESIOLOGY

## 2024-03-07 PROCEDURE — 96366 THER/PROPH/DIAG IV INF ADDON: CPT | Mod: 59

## 2024-03-07 PROCEDURE — 99900031 HC PATIENT EDUCATION (STAT)

## 2024-03-07 PROCEDURE — 63600175 PHARM REV CODE 636 W HCPCS: Performed by: PHYSICIAN ASSISTANT

## 2024-03-07 PROCEDURE — 27447 TOTAL KNEE ARTHROPLASTY: CPT | Mod: RT,,, | Performed by: SPECIALIST

## 2024-03-07 PROCEDURE — G0378 HOSPITAL OBSERVATION PER HR: HCPCS

## 2024-03-07 PROCEDURE — 36000712 HC OR TIME LEV V 1ST 15 MIN: Performed by: SPECIALIST

## 2024-03-07 PROCEDURE — D9220A PRA ANESTHESIA: Mod: CRNA,,, | Performed by: STUDENT IN AN ORGANIZED HEALTH CARE EDUCATION/TRAINING PROGRAM

## 2024-03-07 PROCEDURE — 64447 NJX AA&/STRD FEMORAL NRV IMG: CPT | Mod: 59,RT | Performed by: ANESTHESIOLOGY

## 2024-03-07 PROCEDURE — 25000003 PHARM REV CODE 250: Performed by: SPECIALIST

## 2024-03-07 PROCEDURE — 82962 GLUCOSE BLOOD TEST: CPT | Performed by: SPECIALIST

## 2024-03-07 PROCEDURE — 96368 THER/DIAG CONCURRENT INF: CPT | Mod: 59

## 2024-03-07 PROCEDURE — 71000033 HC RECOVERY, INTIAL HOUR: Performed by: SPECIALIST

## 2024-03-07 PROCEDURE — 37000008 HC ANESTHESIA 1ST 15 MINUTES: Performed by: SPECIALIST

## 2024-03-07 PROCEDURE — 94761 N-INVAS EAR/PLS OXIMETRY MLT: CPT

## 2024-03-07 PROCEDURE — 88311 DECALCIFY TISSUE: CPT

## 2024-03-07 PROCEDURE — 25000003 PHARM REV CODE 250: Performed by: PHYSICIAN ASSISTANT

## 2024-03-07 PROCEDURE — 51798 US URINE CAPACITY MEASURE: CPT | Performed by: SPECIALIST

## 2024-03-07 PROCEDURE — 94799 UNLISTED PULMONARY SVC/PX: CPT | Mod: XB

## 2024-03-07 PROCEDURE — 64447 NJX AA&/STRD FEMORAL NRV IMG: CPT | Mod: 59,RT,, | Performed by: ANESTHESIOLOGY

## 2024-03-07 PROCEDURE — 96367 TX/PROPH/DG ADDL SEQ IV INF: CPT | Mod: 59

## 2024-03-07 PROCEDURE — 63600175 PHARM REV CODE 636 W HCPCS: Mod: JZ,JG | Performed by: ANESTHESIOLOGY

## 2024-03-07 PROCEDURE — C1713 ANCHOR/SCREW BN/BN,TIS/BN: HCPCS | Performed by: SPECIALIST

## 2024-03-07 PROCEDURE — 96375 TX/PRO/DX INJ NEW DRUG ADDON: CPT | Mod: 59

## 2024-03-07 PROCEDURE — 36000713 HC OR TIME LEV V EA ADD 15 MIN: Performed by: SPECIALIST

## 2024-03-07 PROCEDURE — 63600175 PHARM REV CODE 636 W HCPCS: Performed by: STUDENT IN AN ORGANIZED HEALTH CARE EDUCATION/TRAINING PROGRAM

## 2024-03-07 PROCEDURE — 85018 HEMOGLOBIN: CPT | Performed by: SPECIALIST

## 2024-03-07 PROCEDURE — 96365 THER/PROPH/DIAG IV INF INIT: CPT | Mod: 59

## 2024-03-07 PROCEDURE — A4216 STERILE WATER/SALINE, 10 ML: HCPCS | Performed by: SPECIALIST

## 2024-03-07 PROCEDURE — 63600175 PHARM REV CODE 636 W HCPCS: Performed by: SPECIALIST

## 2024-03-07 PROCEDURE — 88305 TISSUE EXAM BY PATHOLOGIST: CPT | Performed by: SPECIALIST

## 2024-03-07 PROCEDURE — 27447 TOTAL KNEE ARTHROPLASTY: CPT | Mod: AS,RT,, | Performed by: PHYSICIAN ASSISTANT

## 2024-03-07 PROCEDURE — 0055T BONE SRGRY CMPTR CT/MRI IMAG: CPT | Mod: ,,, | Performed by: SPECIALIST

## 2024-03-07 PROCEDURE — C1776 JOINT DEVICE (IMPLANTABLE): HCPCS | Performed by: SPECIALIST

## 2024-03-07 PROCEDURE — 27201423 OPTIME MED/SURG SUP & DEVICES STERILE SUPPLY: Performed by: SPECIALIST

## 2024-03-07 DEVICE — TIBIAL COMPONENT
Type: IMPLANTABLE DEVICE | Site: KNEE | Status: FUNCTIONAL
Brand: TRIATHLON

## 2024-03-07 DEVICE — TIBIAL BEARING INSERT - CS
Type: IMPLANTABLE DEVICE | Site: KNEE | Status: FUNCTIONAL
Brand: TRIATHLON

## 2024-03-07 DEVICE — CRUCIATE RETAINING FEMORAL
Type: IMPLANTABLE DEVICE | Site: KNEE | Status: FUNCTIONAL
Brand: TRIATHLON

## 2024-03-07 RX ORDER — ROPIVACAINE HYDROCHLORIDE 5 MG/ML
INJECTION, SOLUTION EPIDURAL; INFILTRATION; PERINEURAL
Status: DISCONTINUED | OUTPATIENT
Start: 2024-03-07 | End: 2024-03-07 | Stop reason: HOSPADM

## 2024-03-07 RX ORDER — LACTULOSE 10 G/15ML
20 SOLUTION ORAL EVERY 6 HOURS PRN
Status: DISCONTINUED | OUTPATIENT
Start: 2024-03-07 | End: 2024-03-09 | Stop reason: HOSPADM

## 2024-03-07 RX ORDER — SODIUM CHLORIDE 9 MG/ML
INJECTION, SOLUTION INTRAMUSCULAR; INTRAVENOUS; SUBCUTANEOUS
Status: DISCONTINUED | OUTPATIENT
Start: 2024-03-07 | End: 2024-03-07 | Stop reason: HOSPADM

## 2024-03-07 RX ORDER — LIDOCAINE HYDROCHLORIDE 10 MG/ML
1 INJECTION, SOLUTION EPIDURAL; INFILTRATION; INTRACAUDAL; PERINEURAL ONCE
Status: DISCONTINUED | OUTPATIENT
Start: 2024-03-07 | End: 2024-03-07 | Stop reason: HOSPADM

## 2024-03-07 RX ORDER — SODIUM CHLORIDE 9 MG/ML
INJECTION, SOLUTION INTRAVENOUS CONTINUOUS
Status: DISCONTINUED | OUTPATIENT
Start: 2024-03-07 | End: 2024-03-08

## 2024-03-07 RX ORDER — MORPHINE SULFATE 10 MG/ML
INJECTION INTRAMUSCULAR; INTRAVENOUS; SUBCUTANEOUS
Status: DISCONTINUED | OUTPATIENT
Start: 2024-03-07 | End: 2024-03-07 | Stop reason: HOSPADM

## 2024-03-07 RX ORDER — BUPIVACAINE HYDROCHLORIDE 2.5 MG/ML
20 INJECTION, SOLUTION EPIDURAL; INFILTRATION; INTRACAUDAL ONCE
Status: DISCONTINUED | OUTPATIENT
Start: 2024-03-07 | End: 2024-03-07 | Stop reason: HOSPADM

## 2024-03-07 RX ORDER — KETOROLAC TROMETHAMINE 10 MG/1
10 TABLET, FILM COATED ORAL
Status: COMPLETED | OUTPATIENT
Start: 2024-03-07 | End: 2024-03-07

## 2024-03-07 RX ORDER — EPINEPHRINE 1 MG/ML
INJECTION, SOLUTION, CONCENTRATE INTRAVENOUS
Status: DISCONTINUED | OUTPATIENT
Start: 2024-03-07 | End: 2024-03-07 | Stop reason: HOSPADM

## 2024-03-07 RX ORDER — METOCLOPRAMIDE HYDROCHLORIDE 5 MG/ML
10 INJECTION INTRAMUSCULAR; INTRAVENOUS
Status: DISCONTINUED | OUTPATIENT
Start: 2024-03-07 | End: 2024-03-07

## 2024-03-07 RX ORDER — ONDANSETRON HYDROCHLORIDE 2 MG/ML
4 INJECTION, SOLUTION INTRAVENOUS ONCE AS NEEDED
Status: DISCONTINUED | OUTPATIENT
Start: 2024-03-07 | End: 2024-03-07 | Stop reason: HOSPADM

## 2024-03-07 RX ORDER — HYDROCODONE BITARTRATE AND ACETAMINOPHEN 5; 325 MG/1; MG/1
1 TABLET ORAL EVERY 4 HOURS PRN
Status: DISCONTINUED | OUTPATIENT
Start: 2024-03-07 | End: 2024-03-09 | Stop reason: HOSPADM

## 2024-03-07 RX ORDER — BUPIVACAINE HYDROCHLORIDE 7.5 MG/ML
INJECTION, SOLUTION EPIDURAL; RETROBULBAR
Status: COMPLETED | OUTPATIENT
Start: 2024-03-07 | End: 2024-03-07

## 2024-03-07 RX ORDER — ROPIVACAINE HYDROCHLORIDE 5 MG/ML
INJECTION, SOLUTION EPIDURAL; INFILTRATION; PERINEURAL
Status: DISPENSED
Start: 2024-03-07 | End: 2024-03-08

## 2024-03-07 RX ORDER — TALC
6 POWDER (GRAM) TOPICAL NIGHTLY PRN
Status: DISCONTINUED | OUTPATIENT
Start: 2024-03-07 | End: 2024-03-09 | Stop reason: HOSPADM

## 2024-03-07 RX ORDER — ONDANSETRON HYDROCHLORIDE 2 MG/ML
4 INJECTION, SOLUTION INTRAVENOUS EVERY 6 HOURS PRN
Status: DISCONTINUED | OUTPATIENT
Start: 2024-03-07 | End: 2024-03-09 | Stop reason: HOSPADM

## 2024-03-07 RX ORDER — GABAPENTIN 300 MG/1
300 CAPSULE ORAL
Status: COMPLETED | OUTPATIENT
Start: 2024-03-07 | End: 2024-03-07

## 2024-03-07 RX ORDER — PROPOFOL 10 MG/ML
VIAL (ML) INTRAVENOUS
Status: DISCONTINUED | OUTPATIENT
Start: 2024-03-07 | End: 2024-03-07

## 2024-03-07 RX ORDER — EPINEPHRINE 1 MG/ML
INJECTION, SOLUTION, CONCENTRATE INTRAVENOUS
Status: DISPENSED
Start: 2024-03-07 | End: 2024-03-08

## 2024-03-07 RX ORDER — PHENYLEPHRINE HCL IN 0.9% NACL 1 MG/10 ML
SYRINGE (ML) INTRAVENOUS
Status: DISCONTINUED | OUTPATIENT
Start: 2024-03-07 | End: 2024-03-07

## 2024-03-07 RX ORDER — SODIUM CHLORIDE 9 MG/ML
INJECTION, SOLUTION INTRAVENOUS CONTINUOUS
Status: DISCONTINUED | OUTPATIENT
Start: 2024-03-07 | End: 2024-03-09 | Stop reason: HOSPADM

## 2024-03-07 RX ORDER — GABAPENTIN 300 MG/1
300 CAPSULE ORAL NIGHTLY
Status: DISCONTINUED | OUTPATIENT
Start: 2024-03-07 | End: 2024-03-09 | Stop reason: HOSPADM

## 2024-03-07 RX ORDER — METHOCARBAMOL 750 MG/1
750 TABLET, FILM COATED ORAL EVERY 8 HOURS PRN
Status: DISCONTINUED | OUTPATIENT
Start: 2024-03-07 | End: 2024-03-09 | Stop reason: HOSPADM

## 2024-03-07 RX ORDER — SCOLOPAMINE TRANSDERMAL SYSTEM 1 MG/1
1 PATCH, EXTENDED RELEASE TRANSDERMAL ONCE AS NEEDED
Status: DISCONTINUED | OUTPATIENT
Start: 2024-03-07 | End: 2024-03-07 | Stop reason: HOSPADM

## 2024-03-07 RX ORDER — LIDOCAINE HYDROCHLORIDE 10 MG/ML
INJECTION, SOLUTION EPIDURAL; INFILTRATION; INTRACAUDAL; PERINEURAL
Status: DISCONTINUED | OUTPATIENT
Start: 2024-03-07 | End: 2024-03-07

## 2024-03-07 RX ORDER — KETOROLAC TROMETHAMINE 30 MG/ML
INJECTION, SOLUTION INTRAMUSCULAR; INTRAVENOUS
Status: DISPENSED
Start: 2024-03-07 | End: 2024-03-08

## 2024-03-07 RX ORDER — MORPHINE SULFATE 4 MG/ML
4 INJECTION, SOLUTION INTRAMUSCULAR; INTRAVENOUS
Status: ACTIVE | OUTPATIENT
Start: 2024-03-07 | End: 2024-03-08

## 2024-03-07 RX ORDER — BUPIVACAINE HYDROCHLORIDE 2.5 MG/ML
INJECTION, SOLUTION EPIDURAL; INFILTRATION; INTRACAUDAL
Status: DISCONTINUED | OUTPATIENT
Start: 2024-03-07 | End: 2024-03-07

## 2024-03-07 RX ORDER — METOCLOPRAMIDE 10 MG/1
10 TABLET ORAL
Status: COMPLETED | OUTPATIENT
Start: 2024-03-08 | End: 2024-03-08

## 2024-03-07 RX ORDER — MORPHINE SULFATE 10 MG/ML
INJECTION INTRAMUSCULAR; INTRAVENOUS; SUBCUTANEOUS
Status: DISPENSED
Start: 2024-03-07 | End: 2024-03-08

## 2024-03-07 RX ORDER — NAPROXEN SODIUM 220 MG/1
81 TABLET, FILM COATED ORAL 2 TIMES DAILY
Status: DISCONTINUED | OUTPATIENT
Start: 2024-03-08 | End: 2024-03-08

## 2024-03-07 RX ORDER — LIDOCAINE HYDROCHLORIDE 10 MG/ML
INJECTION INFILTRATION; PERINEURAL
Status: DISPENSED
Start: 2024-03-07 | End: 2024-03-08

## 2024-03-07 RX ORDER — AMOXICILLIN 250 MG
2 CAPSULE ORAL 2 TIMES DAILY
Status: DISCONTINUED | OUTPATIENT
Start: 2024-03-07 | End: 2024-03-09 | Stop reason: HOSPADM

## 2024-03-07 RX ORDER — POLYETHYLENE GLYCOL 3350 17 G/17G
17 POWDER, FOR SOLUTION ORAL NIGHTLY
Status: DISCONTINUED | OUTPATIENT
Start: 2024-03-07 | End: 2024-03-09 | Stop reason: HOSPADM

## 2024-03-07 RX ORDER — KETOROLAC TROMETHAMINE 30 MG/ML
INJECTION, SOLUTION INTRAMUSCULAR; INTRAVENOUS
Status: DISCONTINUED | OUTPATIENT
Start: 2024-03-07 | End: 2024-03-07 | Stop reason: HOSPADM

## 2024-03-07 RX ORDER — FAMOTIDINE 20 MG/1
20 TABLET, FILM COATED ORAL 2 TIMES DAILY
Status: DISCONTINUED | OUTPATIENT
Start: 2024-03-07 | End: 2024-03-09 | Stop reason: HOSPADM

## 2024-03-07 RX ORDER — GLYCOPYRROLATE 0.2 MG/ML
INJECTION INTRAMUSCULAR; INTRAVENOUS
Status: DISCONTINUED | OUTPATIENT
Start: 2024-03-07 | End: 2024-03-07

## 2024-03-07 RX ORDER — ONDANSETRON 4 MG/1
4 TABLET, ORALLY DISINTEGRATING ORAL
Status: COMPLETED | OUTPATIENT
Start: 2024-03-07 | End: 2024-03-07

## 2024-03-07 RX ORDER — ACETAMINOPHEN 10 MG/ML
1000 INJECTION, SOLUTION INTRAVENOUS ONCE
Status: COMPLETED | OUTPATIENT
Start: 2024-03-07 | End: 2024-03-07

## 2024-03-07 RX ORDER — BISACODYL 10 MG/1
10 SUPPOSITORY RECTAL DAILY
Status: DISCONTINUED | OUTPATIENT
Start: 2024-03-10 | End: 2024-03-09 | Stop reason: HOSPADM

## 2024-03-07 RX ORDER — HYDROMORPHONE HYDROCHLORIDE 2 MG/ML
0.4 INJECTION, SOLUTION INTRAMUSCULAR; INTRAVENOUS; SUBCUTANEOUS EVERY 5 MIN PRN
Status: DISCONTINUED | OUTPATIENT
Start: 2024-03-07 | End: 2024-03-07 | Stop reason: HOSPADM

## 2024-03-07 RX ORDER — ALUMINUM HYDROXIDE, MAGNESIUM HYDROXIDE, AND SIMETHICONE 1200; 120; 1200 MG/30ML; MG/30ML; MG/30ML
30 SUSPENSION ORAL EVERY 6 HOURS PRN
Status: DISCONTINUED | OUTPATIENT
Start: 2024-03-07 | End: 2024-03-09 | Stop reason: HOSPADM

## 2024-03-07 RX ORDER — DOCUSATE SODIUM 100 MG/1
200 CAPSULE, LIQUID FILLED ORAL DAILY
Status: DISCONTINUED | OUTPATIENT
Start: 2024-03-08 | End: 2024-03-09 | Stop reason: HOSPADM

## 2024-03-07 RX ORDER — SODIUM CHLORIDE, SODIUM GLUCONATE, SODIUM ACETATE, POTASSIUM CHLORIDE AND MAGNESIUM CHLORIDE 30; 37; 368; 526; 502 MG/100ML; MG/100ML; MG/100ML; MG/100ML; MG/100ML
INJECTION, SOLUTION INTRAVENOUS CONTINUOUS
Status: DISCONTINUED | OUTPATIENT
Start: 2024-03-07 | End: 2024-03-08

## 2024-03-07 RX ORDER — MIDAZOLAM HYDROCHLORIDE 1 MG/ML
INJECTION INTRAMUSCULAR; INTRAVENOUS
Status: DISCONTINUED | OUTPATIENT
Start: 2024-03-07 | End: 2024-03-07

## 2024-03-07 RX ORDER — ACETAMINOPHEN 500 MG
1000 TABLET ORAL
Status: COMPLETED | OUTPATIENT
Start: 2024-03-07 | End: 2024-03-07

## 2024-03-07 RX ORDER — SODIUM CHLORIDE 0.9 % (FLUSH) 0.9 %
SYRINGE (ML) INJECTION
Status: DISPENSED
Start: 2024-03-07 | End: 2024-03-08

## 2024-03-07 RX ORDER — TRANEXAMIC ACID 650 MG/1
1950 TABLET ORAL
Status: COMPLETED | OUTPATIENT
Start: 2024-03-07 | End: 2024-03-07

## 2024-03-07 RX ORDER — TRAMADOL HYDROCHLORIDE 50 MG/1
50 TABLET ORAL EVERY 4 HOURS PRN
Status: DISCONTINUED | OUTPATIENT
Start: 2024-03-07 | End: 2024-03-09 | Stop reason: HOSPADM

## 2024-03-07 RX ADMIN — METOCLOPRAMIDE 10 MG: 5 INJECTION, SOLUTION INTRAMUSCULAR; INTRAVENOUS at 05:03

## 2024-03-07 RX ADMIN — SODIUM CHLORIDE, SODIUM GLUCONATE, SODIUM ACETATE, POTASSIUM CHLORIDE AND MAGNESIUM CHLORIDE: 526; 502; 368; 37; 30 INJECTION, SOLUTION INTRAVENOUS at 02:03

## 2024-03-07 RX ADMIN — PROPOFOL 50 MG: 10 INJECTION, EMULSION INTRAVENOUS at 02:03

## 2024-03-07 RX ADMIN — Medication 100 MCG: at 02:03

## 2024-03-07 RX ADMIN — SODIUM CHLORIDE, SODIUM GLUCONATE, SODIUM ACETATE, POTASSIUM CHLORIDE AND MAGNESIUM CHLORIDE: 526; 502; 368; 37; 30 INJECTION, SOLUTION INTRAVENOUS at 10:03

## 2024-03-07 RX ADMIN — GABAPENTIN 300 MG: 300 CAPSULE ORAL at 08:03

## 2024-03-07 RX ADMIN — FAMOTIDINE 20 MG: 20 TABLET ORAL at 08:03

## 2024-03-07 RX ADMIN — CEFAZOLIN 2 G: 2 INJECTION, POWDER, FOR SOLUTION INTRAMUSCULAR; INTRAVENOUS at 02:03

## 2024-03-07 RX ADMIN — GLYCOPYRROLATE 0.2 MG: 0.2 INJECTION INTRAMUSCULAR; INTRAVENOUS at 02:03

## 2024-03-07 RX ADMIN — LIDOCAINE HYDROCHLORIDE 50 MG: 10 INJECTION, SOLUTION EPIDURAL; INFILTRATION; INTRACAUDAL; PERINEURAL at 02:03

## 2024-03-07 RX ADMIN — ACETAMINOPHEN 1000 MG: 500 TABLET ORAL at 10:03

## 2024-03-07 RX ADMIN — ONDANSETRON 4 MG: 4 TABLET, ORALLY DISINTEGRATING ORAL at 10:03

## 2024-03-07 RX ADMIN — CEFAZOLIN 2 G: 2 INJECTION, POWDER, FOR SOLUTION INTRAMUSCULAR; INTRAVENOUS at 08:03

## 2024-03-07 RX ADMIN — Medication 100 MCG: at 03:03

## 2024-03-07 RX ADMIN — METHOCARBAMOL TABLETS 750 MG: 750 TABLET, COATED ORAL at 08:03

## 2024-03-07 RX ADMIN — BUPIVACAINE HYDROCHLORIDE 1.7 ML: 7.5 INJECTION, SOLUTION EPIDURAL; RETROBULBAR at 02:03

## 2024-03-07 RX ADMIN — ACETAMINOPHEN 1000 MG: 10 INJECTION INTRAVENOUS at 10:03

## 2024-03-07 RX ADMIN — POLYETHYLENE GLYCOL 3350 17 G: 17 POWDER, FOR SOLUTION ORAL at 08:03

## 2024-03-07 RX ADMIN — KETOROLAC TROMETHAMINE 10 MG: 10 TABLET, FILM COATED ORAL at 10:03

## 2024-03-07 RX ADMIN — SENNOSIDES AND DOCUSATE SODIUM 2 TABLET: 8.6; 5 TABLET ORAL at 08:03

## 2024-03-07 RX ADMIN — PHENYLEPHRINE HYDROCHLORIDE 25 MCG/MIN: 10 INJECTION INTRAVENOUS at 02:03

## 2024-03-07 RX ADMIN — Medication 200 MCG: at 03:03

## 2024-03-07 RX ADMIN — BUPIVACAINE HYDROCHLORIDE 30 ML: 2.5 INJECTION, SOLUTION EPIDURAL; INFILTRATION; INTRACAUDAL; PERINEURAL at 04:03

## 2024-03-07 RX ADMIN — TRAMADOL HYDROCHLORIDE 50 MG: 50 TABLET, COATED ORAL at 10:03

## 2024-03-07 RX ADMIN — MIDAZOLAM 2 MG: 1 INJECTION INTRAMUSCULAR; INTRAVENOUS at 02:03

## 2024-03-07 RX ADMIN — TRANEXAMIC ACID 1950 MG: 650 TABLET ORAL at 10:03

## 2024-03-07 RX ADMIN — GABAPENTIN 300 MG: 300 CAPSULE ORAL at 10:03

## 2024-03-07 NOTE — ANESTHESIA POSTPROCEDURE EVALUATION
Anesthesia Post Evaluation    Patient: Jeana Muhammad    Procedure(s) Performed: Procedure(s) (LRB):  ROBOTIC ARTHROPLASTY, KNEE, TOTAL (Right)    Final Anesthesia Type: spinal      Patient location during evaluation: PACU  Patient participation: Yes- Able to Participate  Level of consciousness: oriented and awake  Post-procedure vital signs: reviewed and stable  Pain management: adequate  Airway patency: patent    PONV status at discharge: No PONV  Anesthetic complications: no      Cardiovascular status: hemodynamically stable  Respiratory status: spontaneous ventilation and unassisted  Hydration status: euvolemic  Follow-up not needed.  Comments: [facility]        NEURO: Neuraxial block resolving.  Adductor Canal Bk done in PACU for RIGHT KNEE PAIN      Vitals Value Taken Time   /64 03/07/24 1632   Temp 36.3 °C (97.3 °F) 03/07/24 1550   Pulse 58 03/07/24 1632   Resp 17 03/07/24 1632   SpO2 100 % 03/07/24 1632   Vitals shown include unvalidated device data.      No case tracking events are documented in the log.      Pain/Jose Score: Pain Rating Prior to Med Admin: 0 (3/7/2024 10:15 AM)  Jose Score: 9 (3/7/2024  4:28 PM)          
adequate on exam

## 2024-03-07 NOTE — TRANSFER OF CARE
"Anesthesia Transfer of Care Note    Patient: Jeana Muhammad    Procedure(s) Performed: Procedure(s) (LRB):  ROBOTIC ARTHROPLASTY, KNEE, TOTAL (Right)    Patient location: PACU    Anesthesia Type: MAC and spinal    Transport from OR: Transported from OR on room air with adequate spontaneous ventilation    Post pain: adequate analgesia    Post assessment: no apparent anesthetic complications    Post vital signs: stable    Level of consciousness: awake and alert    Nausea/Vomiting: no nausea/vomiting    Complications: none    Transfer of care protocol was followed      Last vitals: Visit Vitals  BP (!) 173/81 (BP Location: Left arm, Patient Position: Lying)   Pulse 70   Temp 36.3 °C (97.4 °F) (Tympanic)   Resp 20   Ht 5' 6" (1.676 m)   Wt 86.7 kg (191 lb 2.2 oz)   LMP  (LMP Unknown)   SpO2 98%   Breastfeeding No   BMI 30.85 kg/m²     "

## 2024-03-07 NOTE — OR NURSING
1610  procedure time out performed for rt adductor canal block, all agree  1618  started  1622  u/s guided rt adductor canal block completed, pt ana well, vss, resp full and regular, continuous monitoring.

## 2024-03-07 NOTE — ANESTHESIA PROCEDURE NOTES
Spinal    Diagnosis: Osteoarthritis, Right Knee  Patient location during procedure: OR  Start time: 3/7/2024 2:13 PM  Timeout: 3/7/2024 2:11 PM  End time: 3/7/2024 2:18 PM    Staffing  Authorizing Provider: Shahid Bhakta MD  Performing Provider: Shahid Bhakta MD    Staffing  Performed by: Shahid Bhakta MD  Authorized by: Shahid Bhakta MD    Preanesthetic Checklist  Completed: patient identified, IV checked, site marked, risks and benefits discussed, surgical consent, monitors and equipment checked, pre-op evaluation and timeout performed  Spinal Block  Patient position: sitting  Prep: Betadine  Patient monitoring: heart rate, continuous pulse ox and frequent blood pressure checks  Approach: midline  Location: L3-4  Injection technique: single shot  CSF Fluid: clear free-flowing CSF  Needle  Needle type: pencil-tip   Needle gauge: 25 G  Needle length: 3.5 in  Additional Documentation: incremental injection and negative aspiration for heme  Needle localization: anatomical landmarks  Assessment  Sensory level: T6   Dermatomal levels determined by pinch or prick  Ease of block: moderate  Patient's tolerance of the procedure: comfortable throughout block and no complaints  Medications:    Medications: bupivacaine (pf) (MARCAINE) injection 0.75% - Intraspinal   1.7 mL - 3/7/2024 2:18:00 PM

## 2024-03-07 NOTE — ANESTHESIA PREPROCEDURE EVALUATION
"                                                                                                             03/06/2024  Jeana Muhammad is a 78 y.o., female, who presents for the following:    Procedure: ROBOTIC ARTHROPLASTY, KNEE, TOTAL (Right: Knee) - [facility]   Anesthesia type: Spinal   Diagnosis: Primary osteoarthritis of right knee [M17.11]   Pre-op diagnosis: Primary osteoarthritis of right knee [M17.11]   Location: [facility] OR  / [facility] OR   Surgeons: Be Reynoso MD     HT : 5'7"    LAB:          Pre-op Assessment    I have reviewed the Patient Summary Reports.     I have reviewed the Nursing Notes. I have reviewed the NPO Status.   I have reviewed the Medications.     Review of Systems  Anesthesia Hx:  No problems with previous Anesthesia             Denies Family Hx of Anesthesia complications.    Denies Personal Hx of Anesthesia complications.                    Cardiovascular:     Hypertension           hyperlipidemia                             Pulmonary:  Pulmonary Normal                       Renal/:   renal calculi               Hepatic/GI:      Liver Disease,            Endocrine:  Endocrine Normal                Physical Exam  General: Alert and Oriented    Airway:  Mallampati: II   Mouth Opening: Normal  TM Distance: Normal  Tongue: Normal  Neck ROM: Normal ROM    Dental:  Intact    Chest/Lungs:  Normal Respiratory Rate    Heart:  Rate: Normal  Rhythm: Regular Rhythm        Anesthesia Plan  Type of Anesthesia, risks & benefits discussed:    Anesthesia Type: Gen Natural Airway, Spinal  Intra-op Monitoring Plan: Standard ASA Monitors  Post Op Pain Control Plan: IV/PO Opioids PRN and peripheral nerve block  Induction:  IV  Airway Plan: Direct  Informed Consent: Informed consent signed with the Patient and all parties understand the risks and agree with anesthesia plan.  All questions answered. Patient consented to blood products? No  ASA Score: 2  Day of Surgery Review of History & Physical: H&P Update " referred to the surgeon/provider.  Anesthesia Plan Notes: Nasal cannula vs facemask supplemental oxygenation   Spinal Anes w/ IV Propofol sedation, poss convert to GA/LMA, if required  Adductor Canal Block for post-operative analgesia, per surgeon request      Ready For Surgery From Anesthesia Perspective.     .

## 2024-03-07 NOTE — PT/OT/SLP PROGRESS
Physical Therapy      Patient Name:  Jeana Muhammad   MRN:  [id number]    Patient not seen today secondary to late surgery. Will follow-up tomorrow AM.

## 2024-03-07 NOTE — OP NOTE
DATE OF PROCEDURE: 03/07/2024      PREOPERATIVE DIAGNOSIS:  Arthritis, right knee.     POSTOPERATIVE DIAGNOSIS:  Arthritis, right knee.     PROCEDURES PERFORMED:  Robotically-assisted right total knee arthroplasty.     SURGEON:  Tay Reynoso M.D.     ASSISTANT: First assistant:Wisam Macias, certified physician assistant, who was necessary and essential for all aspects of the operation, including but no limited to patient positioning, surgical exposure, bony preparation, implantation, wound closure, and dressing placement.       ANESTHESIA: spinal     COMPLICATIONS:  None.     COUNTS:  Correct.     DISPOSITION:  Recovery Room, stable.     SPECIMENS:  Bone and cartilage.     FINDINGS:  Arthritis.      ESTIMATED BLOOD LOSS:  <25ml     IMPLANTS:  Matt Triathlon size 3 right  Triathlon cruciate retaining femoral component and a size 3 primary tibial baseplate, and a size 3, 9 mm   CS tibial insert.     INDICATIONS FOR PROCEDURE:    Jeana Muhammad is a 78 y.o. year old female    who is   having symptoms of right knee pain.  Physical examination and imaging studies   were consistent with arthritis.Treatment options were explained and it was decided   to proceed with robotically-assisted right total knee arthroplasty.  She was   aware of reasonable treatment options as well as risks and benefits.       PROCEDURE IN DETAIL:  After appropriate consent was obtained, the patient was  brought to the Operating Room, anesthesia was administered.  She received   antibiotic prophylaxis.  A tourniquet was applied to the proximal  right thigh.  right lower extremity was then prepped and draped in usual sterile   fashion.  The leg cisneros was applied.  Timeout was called.  Limb was elevated   and tourniquet was inflated.  The knee was flexed.  An incision was made from   the tibial tubercle just proximal to the superior pole of the patella.  It was   taken down through the skin and retinaculum.  Skin bleeders were coagulated  with cautery. A medial parapatellar arthrotomy   was performed.   Following this, the anterior cruciate ligament was resected, fat pad   was excised, and medial and lateral meniscal remnants were excised.  Pins were placed in the femur and tibia, followed by assembly and registration of the femoral and tibial arrays.  Hip center and ankle center registration was performed. Femoral and tibial checkpoints were placed and registered. Fine point registration of the femur and tibia was performed, followed by excision of osteophytes and ligament balancing.  When the plan was balanced symmetrically throughout a range of motion, robotic assisted size 3 femoral and size 3 tibial cuts were made.  Posterior oseophytes and loose bodies were excised. A size 3 tibial trial was placed and pinned posteromedially to allow for rotational adjustment and appropriate patella tracking prior to final positional pinning. I then placed a 9 mm trial tibial bearing insert along with a size 3 femoral trial.  The knee was brought into full extension and the preoperative valgus deformity was corrected appropriately, relative to the mechanical axis.   Intra-operative motion was 0 degrees to 130 degrees, with excellent medial and lateral stability in mid and deep flexion as well as extension. The patella tracked appropriately and the final tibial rotation was pinned in position.  There was symmetric ligament balancing throughout the range of motion.  The femur and tibial bone preparation was then made for implantation.  Trials were removed and bone surfaces were irrigated, suctioned, and prepared.  I then implanted a size 3 tibial component, followed by pressfit of a 9 mm polyethylene bearing. The size 3 femur was then implanted. There was excellent fixation of all components. Range of motion was 0 degrees to 130 degrees with symmetric ligament balancing and appropriate patella tracking. The knee was irrigated, suctioned, and injected for  postoperative pain control. The arthrotomy was then closed with #1 braided suture, followed by reapproximation of skin edges with 2-0 vicryl suture. Surgical staples were used for skin closure. Sterile dressings were applied. The patient was then taken to recovery room in stable condition.

## 2024-03-07 NOTE — OR NURSING
Pt c/o itching at Rt knee where chlorhexidine wipes was used, redness noted, no swelling noted. Washed knee with soap and water. Pt stated no discomfort or itching. Notified MD of sensitivity.

## 2024-03-07 NOTE — ANESTHESIA PROCEDURE NOTES
Peripheral Block    Patient location during procedure: post-op   Block not for primary anesthetic.  Reason for block: at surgeon's request and post-op pain management   Post-op Pain Location: , Right Knee   Start time: 3/7/2024 4:18 PM  Timeout: 3/7/2024 4:13 PM   End time: 3/7/2024 4:22 PM    Staffing  Authorizing Provider: Shahid Bhakta MD  Performing Provider: Shahid Bhakta MD    Staffing  Performed by: Shahid Bhakta MD  Authorized by: Shahid Bhakta MD    Preanesthetic Checklist  Completed: patient identified, IV checked, site marked, risks and benefits discussed, surgical consent, monitors and equipment checked, pre-op evaluation and timeout performed  Peripheral Block  Patient position: supine  Prep: Betadine Prepped  Patient monitoring: heart rate, cardiac monitor, continuous pulse ox and frequent blood pressure checks  Block type: adductor canal  Laterality: right  Injection technique: single shot  Needle  Needle type: Stimuplex   Needle gauge: 20 G  Needle length: 4 in  Needle localization: anatomical landmarks and ultrasound guidance   -ultrasound image captured on disc.  Assessment  Injection assessment: negative aspiration, negative parasthesia and local visualized surrounding nerve  Paresthesia pain: none  Heart rate change: no  Slow fractionated injection: yes  Pain Tolerance: comfortable throughout block and no complaints  Medications:    Medications: bupivacaine (pf) (MARCAINE) injection 0.25% - Perineural   30 mL - 3/7/2024 4:22:00 PM    Additional Notes  VSS.  [facility] RN monitoring vitals throughout procedure. U/S Image revealed normal appearing Adductor Canal anatomy. Continuously aspirated between incremental injections (HEME - neg).  Patient tolerated procedure well.     Addendum by Dr Herrera for postop pain location(PAIN LOCATION - RIGHT KNEE)

## 2024-03-08 LAB
ANION GAP SERPL CALC-SCNC: 8 MEQ/L
BUN SERPL-MCNC: 9.7 MG/DL (ref 9.8–20.1)
CALCIUM SERPL-MCNC: 9.5 MG/DL (ref 8.4–10.2)
CHLORIDE SERPL-SCNC: 107 MMOL/L (ref 98–107)
CO2 SERPL-SCNC: 24 MMOL/L (ref 23–31)
CREAT SERPL-MCNC: 0.66 MG/DL (ref 0.55–1.02)
CREAT/UREA NIT SERPL: 15
ERYTHROCYTE [DISTWIDTH] IN BLOOD BY AUTOMATED COUNT: 14.1 % (ref 11.5–17)
GFR SERPLBLD CREATININE-BSD FMLA CKD-EPI: >60 MLS/MIN/1.73/M2
GLUCOSE SERPL-MCNC: 116 MG/DL (ref 82–115)
HCT VFR BLD AUTO: 34.5 % (ref 37–47)
HGB BLD-MCNC: 10.8 G/DL (ref 12–16)
MCH RBC QN AUTO: 29.9 PG (ref 27–31)
MCHC RBC AUTO-ENTMCNC: 31.3 G/DL (ref 33–36)
MCV RBC AUTO: 95.6 FL (ref 80–94)
NRBC BLD AUTO-RTO: 0 %
PLATELET # BLD AUTO: 308 X10(3)/MCL (ref 130–400)
PMV BLD AUTO: 11.6 FL (ref 7.4–10.4)
POTASSIUM SERPL-SCNC: 4.1 MMOL/L (ref 3.5–5.1)
RBC # BLD AUTO: 3.61 X10(6)/MCL (ref 4.2–5.4)
SODIUM SERPL-SCNC: 139 MMOL/L (ref 136–145)
WBC # SPEC AUTO: 13.7 X10(3)/MCL (ref 4.5–11.5)

## 2024-03-08 PROCEDURE — 85027 COMPLETE CBC AUTOMATED: CPT | Performed by: SPECIALIST

## 2024-03-08 PROCEDURE — 97116 GAIT TRAINING THERAPY: CPT

## 2024-03-08 PROCEDURE — 63600175 PHARM REV CODE 636 W HCPCS: Performed by: SPECIALIST

## 2024-03-08 PROCEDURE — 25000003 PHARM REV CODE 250: Performed by: SPECIALIST

## 2024-03-08 PROCEDURE — 97162 PT EVAL MOD COMPLEX 30 MIN: CPT

## 2024-03-08 PROCEDURE — 96366 THER/PROPH/DIAG IV INF ADDON: CPT | Mod: 59

## 2024-03-08 PROCEDURE — G0378 HOSPITAL OBSERVATION PER HR: HCPCS

## 2024-03-08 PROCEDURE — 94799 UNLISTED PULMONARY SVC/PX: CPT | Mod: XB

## 2024-03-08 PROCEDURE — 94761 N-INVAS EAR/PLS OXIMETRY MLT: CPT

## 2024-03-08 PROCEDURE — 80048 BASIC METABOLIC PNL TOTAL CA: CPT | Performed by: SPECIALIST

## 2024-03-08 PROCEDURE — 99900031 HC PATIENT EDUCATION (STAT)

## 2024-03-08 RX ORDER — POLYETHYLENE GLYCOL 3350 17 G/17G
17 POWDER, FOR SOLUTION ORAL NIGHTLY
Qty: 14 EACH | Refills: 0 | Status: SHIPPED | OUTPATIENT
Start: 2024-03-08 | End: 2024-03-22

## 2024-03-08 RX ORDER — METHOCARBAMOL 750 MG/1
750 TABLET, FILM COATED ORAL EVERY 8 HOURS PRN
Qty: 30 TABLET | Refills: 0 | Status: SHIPPED | OUTPATIENT
Start: 2024-03-08 | End: 2024-03-18 | Stop reason: SDUPTHER

## 2024-03-08 RX ORDER — NAPROXEN SODIUM 220 MG/1
81 TABLET, FILM COATED ORAL 2 TIMES DAILY
Status: DISCONTINUED | OUTPATIENT
Start: 2024-03-08 | End: 2024-03-09 | Stop reason: HOSPADM

## 2024-03-08 RX ORDER — DICYCLOMINE HYDROCHLORIDE 10 MG/1
20 CAPSULE ORAL
Status: DISCONTINUED | OUTPATIENT
Start: 2024-03-08 | End: 2024-03-09 | Stop reason: HOSPADM

## 2024-03-08 RX ORDER — LANOLIN ALCOHOL/MO/W.PET/CERES
400 CREAM (GRAM) TOPICAL DAILY
Status: DISCONTINUED | OUTPATIENT
Start: 2024-03-08 | End: 2024-03-09 | Stop reason: HOSPADM

## 2024-03-08 RX ORDER — HYDROXYZINE HYDROCHLORIDE 25 MG/1
25 TABLET, FILM COATED ORAL NIGHTLY PRN
Status: DISCONTINUED | OUTPATIENT
Start: 2024-03-08 | End: 2024-03-09 | Stop reason: HOSPADM

## 2024-03-08 RX ORDER — NAPROXEN SODIUM 220 MG/1
81 TABLET, FILM COATED ORAL 2 TIMES DAILY
Qty: 84 TABLET | Refills: 0 | Status: SHIPPED | OUTPATIENT
Start: 2024-03-08 | End: 2024-04-19

## 2024-03-08 RX ORDER — HYDROCODONE BITARTRATE AND ACETAMINOPHEN 5; 325 MG/1; MG/1
1 TABLET ORAL EVERY 4 HOURS PRN
Qty: 42 TABLET | Refills: 0 | Status: SHIPPED | OUTPATIENT
Start: 2024-03-08 | End: 2024-03-15

## 2024-03-08 RX ORDER — KETOROLAC TROMETHAMINE 10 MG/1
10 TABLET, FILM COATED ORAL EVERY 6 HOURS
Status: DISCONTINUED | OUTPATIENT
Start: 2024-03-08 | End: 2024-03-09 | Stop reason: HOSPADM

## 2024-03-08 RX ADMIN — GABAPENTIN 300 MG: 300 CAPSULE ORAL at 08:03

## 2024-03-08 RX ADMIN — METHOCARBAMOL TABLETS 750 MG: 750 TABLET, COATED ORAL at 05:03

## 2024-03-08 RX ADMIN — Medication 400 MG: at 11:03

## 2024-03-08 RX ADMIN — KETOROLAC TROMETHAMINE 10 MG: 10 TABLET, FILM COATED ORAL at 11:03

## 2024-03-08 RX ADMIN — Medication 6 MG: at 08:03

## 2024-03-08 RX ADMIN — FAMOTIDINE 20 MG: 20 TABLET ORAL at 08:03

## 2024-03-08 RX ADMIN — METOCLOPRAMIDE 10 MG: 10 TABLET ORAL at 11:03

## 2024-03-08 RX ADMIN — ASPIRIN 81 MG 81 MG: 81 TABLET ORAL at 08:03

## 2024-03-08 RX ADMIN — METOCLOPRAMIDE 10 MG: 10 TABLET ORAL at 05:03

## 2024-03-08 RX ADMIN — HYDROCODONE BITARTRATE AND ACETAMINOPHEN 0.5 TABLET: 5; 325 TABLET ORAL at 05:03

## 2024-03-08 RX ADMIN — HYDROCODONE BITARTRATE AND ACETAMINOPHEN 1 TABLET: 5; 325 TABLET ORAL at 02:03

## 2024-03-08 RX ADMIN — HYDROCODONE BITARTRATE AND ACETAMINOPHEN 0.5 TABLET: 5; 325 TABLET ORAL at 01:03

## 2024-03-08 RX ADMIN — POLYETHYLENE GLYCOL 3350 17 G: 17 POWDER, FOR SOLUTION ORAL at 08:03

## 2024-03-08 RX ADMIN — HYDROCODONE BITARTRATE AND ACETAMINOPHEN 1 TABLET: 5; 325 TABLET ORAL at 08:03

## 2024-03-08 RX ADMIN — METHOCARBAMOL TABLETS 750 MG: 750 TABLET, COATED ORAL at 02:03

## 2024-03-08 RX ADMIN — KETOROLAC TROMETHAMINE 10 MG: 10 TABLET, FILM COATED ORAL at 05:03

## 2024-03-08 RX ADMIN — METOCLOPRAMIDE 10 MG: 10 TABLET ORAL at 12:03

## 2024-03-08 RX ADMIN — SENNOSIDES AND DOCUSATE SODIUM 2 TABLET: 8.6; 5 TABLET ORAL at 08:03

## 2024-03-08 RX ADMIN — CEFAZOLIN 2 G: 2 INJECTION, POWDER, FOR SOLUTION INTRAMUSCULAR; INTRAVENOUS at 02:03

## 2024-03-08 RX ADMIN — METOCLOPRAMIDE 10 MG: 10 TABLET ORAL at 01:03

## 2024-03-08 RX ADMIN — METHOCARBAMOL TABLETS 750 MG: 750 TABLET, COATED ORAL at 11:03

## 2024-03-08 RX ADMIN — DOCUSATE SODIUM 200 MG: 100 CAPSULE, LIQUID FILLED ORAL at 05:03

## 2024-03-08 RX ADMIN — CEFAZOLIN 2 G: 2 INJECTION, POWDER, FOR SOLUTION INTRAMUSCULAR; INTRAVENOUS at 08:03

## 2024-03-08 NOTE — PT/OT/SLP PROGRESS
Physical Therapy Treatment    Patient Name:  Jeana Muhammad   MRN:  [id number]    Recommendations:     Discharge Recommendations: Low Intensity Therapy  Discharge Equipment Recommendations: walker, rolling  Barriers to discharge:  impaired functional mobility    Assessment:     Jeana Muhammad is a 78 y.o. female admitted with a medical diagnosis of Primary osteoarthritis of right knee.  She presents with the following impairments/functional limitations: weakness, impaired endurance, impaired functional mobility, decreased lower extremity function, edema, orthopedic precautions.    Rehab Prognosis: Good; patient would benefit from acute skilled PT services to address these deficits and reach maximum level of function.    Recent Surgery: Procedure(s) (LRB):  ROBOTIC ARTHROPLASTY, KNEE, TOTAL (Right) 1 Day Post-Op    Plan:     During this hospitalization, patient to be seen BID to address the identified rehab impairments via gait training, therapeutic activities, therapeutic exercises and progress toward the following goals:    Plan of Care Expires:  03/14/24    Subjective     Chief Complaint: pain, weakness  Pain/Comfort:  Pain Rating 1: 5/10  Location - Side 1: Right  Location 1: knee  Pain Addressed 1: Pre-medicate for activity, Reposition, Cessation of Activity  Pain Rating Post-Intervention 1: 4/10      Objective:     Communicated with pt and PT Ana prior to session.  Patient found ambulatory in room/kearney with her  upon PT entry to room.     General Precautions: Standard, fall  Orthopedic Precautions: RLE weight bearing as tolerated  Braces:    Respiratory Status: Room air     Functional Mobility:  Bed Mobility:     Sit to Supine: stand by assistance  Transfers:     Sit to Stand:  contact guard assistance with rolling walker  Bed to Chair: contact guard assistance with  rolling walker  using  Stand Pivot  Gait: pt amb 150' with RW and SBA. Slow gait speed, with VC needed for increasing R knee flexion and  heel/toe gait with R LE; improved as pt continued to practice.   Stairs:  Pt ascended/descended 3 stair(s) with No Assistive Device with bilateral handrails with Contact Guard Assistance.       Patient left HOB elevated with call button in reach and pts  present..    GOALS:   Multidisciplinary Problems       Physical Therapy Goals          Problem: Physical Therapy    Goal Priority Disciplines Outcome Goal Variances Interventions   Physical Therapy Goal     PT, PT/OT Ongoing, Progressing     Description: Pt will improve functional independence by performing:    MET - Bed mobility: SBA  Sit to stand: SBA with rolling walker  Bed to chair: SBA with Stand Step  with rolling walker   Car Transfer: SBA with rolling walker  Ambulation x 200'  feet with SBA and rolling walker  1 Step (Curb): Min A  and rolling walker  MET - 3 Steps: Min A  and B HR  right knee AROM flexion (in degrees): 90  right knee AROM extension (in degrees): 0   Independent with total knee HEP                          Time Tracking:     PT Received On:    PT Start Time: 1355     PT Stop Time: 1410  PT Total Time (min): 15 min     Billable Minutes: Gait Training 15 min    Treatment Type: Treatment  PT/PTA: PT     Number of PTA visits since last PT visit: 0     03/08/2024

## 2024-03-08 NOTE — NURSING
Nurses Note -- 4 Eyes      3/7/2024   6:09 PM      Skin assessed during: Admit      [x] No Altered Skin Integrity Present    [x]Prevention Measures Documented      [] Yes- Altered Skin Integrity Present or Discovered   [] LDA Added if Not in Epic (Describe Wound)   [] New Altered Skin Integrity was Present on Admit and Documented in LDA   [] Wound Image Taken    Wound Care Consulted? No    Attending Nurse:  Chloe Genao RN/Staff Member:   adam cleary

## 2024-03-08 NOTE — PROGRESS NOTES
"No acute events overnight.  Pain controlled.  Resting in bed.    Vital Signs  Temp: 98 °F (36.7 °C)  Temp Source: Oral  Pulse: 72  Heart Rate Source: Monitor  Resp: 18  SpO2: 97 %  Pulse Oximetry Type: Intermittent  Oxygen Concentration (%): 80  Device (Oxygen Therapy): room air  BP: 125/71  BP Location: Right arm  BP Method: Automatic  Patient Position: Lying  Arousal Level: arouses to voice  Height and Weight  Height: 5' 6" (167.6 cm)  Height Method: Stated  Weight: 86.7 kg (191 lb 2.2 oz)  Weight Method: Standard Scale  BSA (Calculated - sq m): 2.01 sq meters  BMI (Calculated): 30.9  Weight in (lb) to have BMI = 25: 154.6]    +FHL/EHL  Brisk capillary refill distally  Dressing c/d/i  Sensation intact to light touch distally    Recent Lab Results         03/08/24  0449   03/07/24  1557        Anion Gap 8.0         BUN 9.7         BUN/CREAT RATIO 15         Calcium 9.5         Chloride 107         CO2 24         Creatinine 0.66         eGFR >60         Glucose 116         Hematocrit 34.5   35.0       Hemoglobin 10.8   11.4       MCH 29.9         MCHC 31.3         MCV 95.6         MPV 11.6         nRBC 0.0         Platelet Count 308         Potassium 4.1         RBC 3.61         RDW 14.1         Sodium 139         WBC 13.70                 A/P:  Status post robotic assisted right total knee arthroplasty  Overall patient doing well.  Therapy for mobility and ambulation.  Aspirin for DVT PPx  Discharged home tomorrow  "

## 2024-03-08 NOTE — PT/OT/SLP EVAL
Physical Therapy Evaluation    Patient Name:  Jeana Muhammad   MRN:  [id number]    Recommendations:     Discharge Recommendations: Low Intensity Therapy   Discharge Equipment Recommendations: walker, rolling   Barriers to discharge:  pain management    Assessment:     Jeana Muhammad is a 78 y.o. female admitted with a medical diagnosis of Primary osteoarthritis of right knee.  She presents with the following impairments/functional limitations: weakness, impaired endurance, impaired functional mobility, decreased lower extremity function, edema, orthopedic precautions .    Rehab Prognosis: Fair; patient would benefit from acute skilled PT services to address these deficits and reach maximum level of function.    Recent Surgery: Procedure(s) (LRB):  ROBOTIC ARTHROPLASTY, KNEE, TOTAL (Right) 1 Day Post-Op    Plan:     During this hospitalization, patient to be seen BID to address the identified rehab impairments via gait training, therapeutic activities, therapeutic exercises and progress toward the following goals:    Plan of Care Expires:  03/14/24    Subjective     Chief Complaint: R knee pain  Patient/Family Comments/goals:   Pain/Comfort:  Location - Side 1: Right  Location 1: knee  Pain Addressed 1: Pre-medicate for activity, Reposition, Distraction, Cessation of Activity    Patients cultural, spiritual, Oriental orthodox conflicts given the current situation:      Living Environment:  Pt lives in single story home with , no steps to enter.  Prior to admission, patients level of function was ind.  Equipment used at home: none.  DME owned (not currently used): none.  Upon discharge, patient will have assistance from .    Objective:     Communicated with nurse prior to session.  Patient found up in chair with peripheral IV  upon PT entry to room.    General Precautions: Standard, fall  Orthopedic Precautions:RLE weight bearing as tolerated   Braces:    Respiratory Status: Room air    Exams:  RLE ROM:     (In  "degrees) AROM PROM   R knee flexion 60 65   R knee extension 0       RLE Strength: NT dt sx side  LLE ROM: WFL  LLE Strength: WFL    Functional Mobility:  Transfers:     Sit to Stand:  contact guard assistance with rolling walker  Car Transfer: contact guard assistance with  rolling walker  using  Step Transfer  Gait: Pt ambulated 200 ft w rw and CGA, using step through gait pattern at slow pace.  Stairs:  Pt ascended/descended 4" curb step with Rolling Walker with bilateral handrails with Minimal Assistance.         Treatment & Education:  Pt edu on total knee protocol and importance of frequent mobility  Pt completed TKA therex x10 AAROM  Pt completed prehab prior to sx.    Patient left up in chair with all lines intact, call button in reach, nurse notified, and  present.    GOALS:   Multidisciplinary Problems       Physical Therapy Goals          Problem: Physical Therapy    Goal Priority Disciplines Outcome Goal Variances Interventions   Physical Therapy Goal     PT, PT/OT Ongoing, Progressing     Description: Pt will improve functional independence by performing:    Bed mobility: SBA  Sit to stand: SBA with rolling walker  Bed to chair: SBA with Stand Step  with rolling walker   Car Transfer: SBA with rolling walker  Ambulation x 200'  feet with SBA and rolling walker  1 Step (Curb): Min A  and rolling walker  3 Steps: Min A  and B HR  right knee AROM flexion (in degrees): 90  right knee AROM extension (in degrees): 0   Independent with total knee HEP                          History:     Past Medical History:   Diagnosis Date    Arthritis     Calculus of kidney     Digestive disorder     Hyperlipemia     Liver disease     fatty liver and cyst on liver    Primary osteoarthritis of right knee 03/07/2024    Seasonal allergies        Past Surgical History:   Procedure Laterality Date    CATARACT EXTRACTION W/  INTRAOCULAR LENS IMPLANT Bilateral     COLONOSCOPY      EXTRACORPOREAL SHOCK WAVE LITHOTRIPSY Left " 04/11/2023    Procedure: LITHOTRIPSY, ESWL Left  Renal Calculus  / Possible Bilateral Retrogrades;  Surgeon: Be Titus MD;  Location: [facility];  Service: Urology;  Laterality: Left;    ROBOTIC ARTHROPLASTY, KNEE Right 3/7/2024    Procedure: ROBOTIC ARTHROPLASTY, KNEE, TOTAL;  Surgeon: Be Reynoso MD;  Location: [facility];  Service: Orthopedics;  Laterality: Right;  Jose Armando    TONSILLECTOMY      TOTAL KNEE ARTHROPLASTY Left     TUBAL LIGATION         Time Tracking:     PT Received On:    PT Start Time: 0910     PT Stop Time: 0948  PT Total Time (min): 38 min     Billable Minutes: Evaluation 30 and Gait Training 8      03/08/2024

## 2024-03-08 NOTE — PLAN OF CARE
03/08/24 1031   Discharge Assessment   Assessment Type Discharge Planning Assessment   Source of Information patient   Does patient/caregiver understand observation status Yes   Communicated ESA with patient/caregiver Yes   Reason For Admission s/p TKR   People in Home spouse   Do you expect to return to your current living situation? Yes   Do you have help at home or someone to help you manage your care at home? Yes   Who are your caregiver(s) and their phone number(s)? - UMAIR [phone]   Prior to hospitilization cognitive status: Alert/Oriented   Current cognitive status: Alert/Oriented   Walking or Climbing Stairs Difficulty yes   Walking or Climbing Stairs ambulation difficulty, requires equipment   Dressing/Bathing Difficulty yes   Dressing/Bathing bathing difficulty, requires equipment   Equipment Currently Used at Home none   Readmission within 30 days? No   Patient currently being followed by outpatient case management? No   Do you currently have service(s) that help you manage your care at home? No   Do you take prescription medications? Yes   Do you have prescription coverage? Yes   Do you have any problems affording any of your prescribed medications? No   Is the patient taking medications as prescribed? yes   Who is going to help you get home at discharge? [facility]   How do you get to doctors appointments? car, drives self   Are you on dialysis? No   Do you take coumadin? No   Discharge Plan A Home with family   Discharge Plan B Home with family   DME Needed Upon Discharge  walker, rolling   Discharge Plan discussed with: Patient   Transition of Care Barriers None     S/p TKR. Spk w pt -- , Umair to asst w homecare. Pt needs RW. Provider list given. Foc obtained.   Call[facility]/ [facility] They will deliver to hospital.   Called/ faxed referral for outpatient therapy to [facility]. They will contact pt with appointment date & time.   PCP:   Benjie ( [facility])  Contact # Lior [phone].         Patient DID participate in a pre-op exercise regimen  -- home exerc

## 2024-03-08 NOTE — PLAN OF CARE
Problem: Adult Inpatient Plan of Care  Goal: Plan of Care Review  Outcome: Ongoing, Progressing  Goal: Patient-Specific Goal (Individualized)  Outcome: Ongoing, Progressing  Goal: Absence of Hospital-Acquired Illness or Injury  Outcome: Ongoing, Progressing  Goal: Optimal Comfort and Wellbeing  Outcome: Ongoing, Progressing  Goal: Readiness for Transition of Care  Outcome: Ongoing, Progressing     Problem: Adjustment to Surgery (Knee Arthroplasty)  Goal: Optimal Coping  Outcome: Ongoing, Progressing     Problem: Bleeding (Knee Arthroplasty)  Goal: Absence of Bleeding  Outcome: Ongoing, Progressing     Problem: Bowel Motility Impaired (Knee Arthroplasty)  Goal: Effective Bowel Elimination  Outcome: Ongoing, Progressing     Problem: Fluid and Electrolyte Imbalance (Knee Arthroplasty)  Goal: Fluid and Electrolyte Balance  Outcome: Ongoing, Progressing     Problem: Functional Ability Impaired (Knee Arthroplasty)  Goal: Optimal Functional Ability  Outcome: Ongoing, Progressing     Problem: Infection (Knee Arthroplasty)  Goal: Absence of Infection Signs and Symptoms  Outcome: Ongoing, Progressing     Problem: Neurovascular Compromise (Knee Arthroplasty)  Goal: Intact Neurovascular Status  Outcome: Ongoing, Progressing     Problem: Ongoing Anesthesia Effects (Knee Arthroplasty)  Goal: Anesthesia/Sedation Recovery  Outcome: Ongoing, Progressing     Problem: Pain (Knee Arthroplasty)  Goal: Acceptable Pain Control  Outcome: Ongoing, Progressing     Problem: Postoperative Nausea and Vomiting (Knee Arthroplasty)  Goal: Nausea and Vomiting Relief  Outcome: Ongoing, Progressing     Problem: Postoperative Urinary Retention (Knee Arthroplasty)  Goal: Effective Urinary Elimination  Outcome: Ongoing, Progressing     Problem: Respiratory Compromise (Knee Arthroplasty)  Goal: Effective Oxygenation and Ventilation  Outcome: Ongoing, Progressing     Problem: Hypertension Comorbidity  Goal: Blood Pressure in Desired Range  Outcome:  Ongoing, Progressing

## 2024-03-09 VITALS
OXYGEN SATURATION: 97 % | DIASTOLIC BLOOD PRESSURE: 82 MMHG | HEART RATE: 74 BPM | SYSTOLIC BLOOD PRESSURE: 151 MMHG | TEMPERATURE: 98 F | RESPIRATION RATE: 18 BRPM | HEIGHT: 66 IN | BODY MASS INDEX: 30.72 KG/M2 | WEIGHT: 191.13 LBS

## 2024-03-09 LAB
ANION GAP SERPL CALC-SCNC: 8 MEQ/L
BUN SERPL-MCNC: 9.4 MG/DL (ref 9.8–20.1)
CALCIUM SERPL-MCNC: 9.5 MG/DL (ref 8.4–10.2)
CHLORIDE SERPL-SCNC: 107 MMOL/L (ref 98–107)
CO2 SERPL-SCNC: 23 MMOL/L (ref 23–31)
CREAT SERPL-MCNC: 0.6 MG/DL (ref 0.55–1.02)
CREAT/UREA NIT SERPL: 16
ERYTHROCYTE [DISTWIDTH] IN BLOOD BY AUTOMATED COUNT: 14 % (ref 11.5–17)
GFR SERPLBLD CREATININE-BSD FMLA CKD-EPI: >60 MLS/MIN/1.73/M2
GLUCOSE SERPL-MCNC: 135 MG/DL (ref 82–115)
HCT VFR BLD AUTO: 32.2 % (ref 37–47)
HGB BLD-MCNC: 10.4 G/DL (ref 12–16)
HOLD SPECIMEN: NORMAL
MCH RBC QN AUTO: 30.1 PG (ref 27–31)
MCHC RBC AUTO-ENTMCNC: 32.3 G/DL (ref 33–36)
MCV RBC AUTO: 93.1 FL (ref 80–94)
NRBC BLD AUTO-RTO: 0 %
PLATELET # BLD AUTO: 269 X10(3)/MCL (ref 130–400)
PMV BLD AUTO: 11.7 FL (ref 7.4–10.4)
POTASSIUM SERPL-SCNC: 3.9 MMOL/L (ref 3.5–5.1)
RBC # BLD AUTO: 3.46 X10(6)/MCL (ref 4.2–5.4)
SODIUM SERPL-SCNC: 138 MMOL/L (ref 136–145)
WBC # SPEC AUTO: 9.79 X10(3)/MCL (ref 4.5–11.5)

## 2024-03-09 PROCEDURE — 80048 BASIC METABOLIC PNL TOTAL CA: CPT | Performed by: SPECIALIST

## 2024-03-09 PROCEDURE — 85027 COMPLETE CBC AUTOMATED: CPT | Performed by: SPECIALIST

## 2024-03-09 PROCEDURE — G0378 HOSPITAL OBSERVATION PER HR: HCPCS

## 2024-03-09 PROCEDURE — 97110 THERAPEUTIC EXERCISES: CPT

## 2024-03-09 PROCEDURE — 25000003 PHARM REV CODE 250: Performed by: SPECIALIST

## 2024-03-09 PROCEDURE — 94761 N-INVAS EAR/PLS OXIMETRY MLT: CPT

## 2024-03-09 PROCEDURE — 97116 GAIT TRAINING THERAPY: CPT

## 2024-03-09 PROCEDURE — 97530 THERAPEUTIC ACTIVITIES: CPT

## 2024-03-09 PROCEDURE — 99900031 HC PATIENT EDUCATION (STAT)

## 2024-03-09 PROCEDURE — 94799 UNLISTED PULMONARY SVC/PX: CPT | Mod: XB

## 2024-03-09 RX ADMIN — ASPIRIN 81 MG 81 MG: 81 TABLET ORAL at 09:03

## 2024-03-09 RX ADMIN — DOCUSATE SODIUM 200 MG: 100 CAPSULE, LIQUID FILLED ORAL at 09:03

## 2024-03-09 RX ADMIN — HYDROCODONE BITARTRATE AND ACETAMINOPHEN 1 TABLET: 5; 325 TABLET ORAL at 09:03

## 2024-03-09 RX ADMIN — KETOROLAC TROMETHAMINE 10 MG: 10 TABLET, FILM COATED ORAL at 05:03

## 2024-03-09 RX ADMIN — HYDROCODONE BITARTRATE AND ACETAMINOPHEN 1 TABLET: 5; 325 TABLET ORAL at 05:03

## 2024-03-09 RX ADMIN — HYDROCODONE BITARTRATE AND ACETAMINOPHEN 1 TABLET: 5; 325 TABLET ORAL at 01:03

## 2024-03-09 RX ADMIN — Medication 400 MG: at 09:03

## 2024-03-09 RX ADMIN — METHOCARBAMOL TABLETS 750 MG: 750 TABLET, COATED ORAL at 11:03

## 2024-03-09 RX ADMIN — SENNOSIDES AND DOCUSATE SODIUM 2 TABLET: 8.6; 5 TABLET ORAL at 09:03

## 2024-03-09 RX ADMIN — KETOROLAC TROMETHAMINE 10 MG: 10 TABLET, FILM COATED ORAL at 11:03

## 2024-03-09 RX ADMIN — FAMOTIDINE 20 MG: 20 TABLET ORAL at 09:03

## 2024-03-09 NOTE — PROGRESS NOTES
"Status post R TKA. POD # 2  No acute events overnight.    Pain controlled.    Resting in bed.   Ambulating with PT.    Vital Signs  Temp: 97.7 °F (36.5 °C)  Temp Source: Oral  Pulse: 99  Heart Rate Source: Monitor  Resp: 18  SpO2: 96 %  Pulse Oximetry Type: Intermittent  Oxygen Concentration (%): 80  Device (Oxygen Therapy): room air  BP: (!) 151/69  BP Location: Right arm  BP Method: Automatic  Patient Position: Lying  Arousal Level: arouses to voice  Height and Weight  Height: 5' 6" (167.6 cm)  Height Method: Stated  Weight: 86.7 kg (191 lb 2.2 oz)  Weight Method: Standard Scale  BSA (Calculated - sq m): 2.01 sq meters  BMI (Calculated): 30.9  Weight in (lb) to have BMI = 25: 154.6]    Lower extremity compartment soft and warm  No s/s of DVT or infection  Dressing c/d/i  NVI distally    Recent Lab Results         03/09/24  0553        Anion Gap 8.0       BUN 9.4       BUN/CREAT RATIO 16       Calcium 9.5       Chloride 107       CO2 23       Creatinine 0.60       eGFR >60       Glucose 135       Hematocrit 32.2       Hemoglobin 10.4       MCH 30.1       MCHC 32.3       MCV 93.1       MPV 11.7       nRBC 0.0       Platelet Count 269       Potassium 3.9       RBC 3.46       RDW 14.0       Sodium 138       WBC 9.79               A/P:    Overall patient doing well.  Therapy for mobility and ambulation.  DVT Ppx  Home today  "

## 2024-03-09 NOTE — PT/OT/SLP PROGRESS
Physical Therapy Treatment    Patient Name:  Jeana Muhammad   MRN:  [id number]    Recommendations:     Discharge Recommendations: Low Intensity Therapy  Discharge Equipment Recommendations: walker, rolling  Barriers to discharge:  impaired functional mobility    Assessment:     Jeana Muhammad is a 78 y.o. female admitted with a medical diagnosis of Primary osteoarthritis of right knee.  She presents with the following impairments/functional limitations: weakness, impaired endurance, impaired functional mobility, decreased lower extremity function, pain, decreased ROM, orthopedic precautions.    Rehab Prognosis: Good; patient would benefit from acute skilled PT services to address these deficits and reach maximum level of function.    Recent Surgery: Procedure(s) (LRB):  ROBOTIC ARTHROPLASTY, KNEE, TOTAL (Right) 2 Days Post-Op    Plan:     During this hospitalization, patient to be seen BID to address the identified rehab impairments via gait training, therapeutic activities, therapeutic exercises and progress toward the following goals:    Plan of Care Expires:  03/14/24    Subjective     Chief Complaint: R knee pain  Patient/Family Comments/goals: to go home today  Pain/Comfort:  Pain Rating 1: 3/10  Location - Side 1: Right  Location 1: knee  Pain Addressed 1: Pre-medicate for activity, Reposition, Distraction      Objective:     Communicated with RN prior to session.  Patient found up in chair with peripheral IV and  present upon PT entry to room.     General Precautions: Standard, fall  Orthopedic Precautions: RLE weight bearing as tolerated  Braces: N/A  Respiratory Status: Room air     Functional Mobility:  Transfers:     Sit to Stand:  stand by assistance with rolling walker  Toilet Transfer: contact guard assistance with  rolling walker  using  Step Transfer; (+) void  Car Transfer: contact guard assistance with  rolling walker  using  Step Transfer  Gait: The pt ambulated ~250 ft, and ~100 ft with RW  and SBA to CGA. The pt required VC for increased R knee flexion with gait; knee flexion improved with increased distance. Slow gait speed. No LOB noted.     (In degrees) AROM PROM   R knee flexion 66 72   R knee extension 2 0     Treatment & Education:  The pt was educated on PT plan of care, safety with mobility, and importance of frequent mobility.     Patient left up in chair with all lines intact, call button in reach, RN notified, and spouse present..    GOALS:   Multidisciplinary Problems       Physical Therapy Goals          Problem: Physical Therapy    Goal Priority Disciplines Outcome Goal Variances Interventions   Physical Therapy Goal     PT, PT/OT Ongoing, Progressing     Description: Pt will improve functional independence by performing:    MET - Bed mobility: SBA  MET - Sit to stand: SBA with rolling walker  Bed to chair: SBA with Stand Step  with rolling walker   Car Transfer: SBA with rolling walker  MET - Ambulation x 200'  feet with SBA and rolling walker  1 Step (Curb): Min A  and rolling walker  MET - 3 Steps: Min A  and B HR  right knee AROM flexion (in degrees): 90  right knee AROM extension (in degrees): 0   Independent with total knee HEP                          Time Tracking:     PT Received On:    PT Start Time: 0740     PT Stop Time: 0810  PT Total Time (min): 30 min     Billable Minutes: Gait Training 15 and Therapeutic Activity 15    Treatment Type: Treatment  PT/PTA: PT     Number of PTA visits since last PT visit: 0     03/09/2024

## 2024-03-09 NOTE — NURSING
RX: scripts given to patient/family  Supplies: Coverlet, TEDs, ACE    Educated on post op instructions, home care, f/u, meds., therapy, wound care, complication prevention, when to seek medical attention, ect. See AVS for specifics.     Questions/concerns addressed. Stable and ready for discharge.     Nurse Note:       3/9/2024   3:00 PM      Perception of Care - Joint Replacement Population Total Joint Surgery List: KNEE    Patient able to verbalize one way to treat/prevent SWELLING at home (other than pain medication)    [x] Yes   [] No   [] Further Education Provided        Attending Nurse:  Teresa

## 2024-03-09 NOTE — DISCHARGE INSTRUCTIONS
[facility]  [address]  [place] [address]  Phone [phone]       /      Fax [phone]  SURGEON: Dr. Reynoso    After discharge, all questions or concerns should be handled at your surgeon's office ([phone]). If it is a weekend or after hours, you will get the surgeon on call.   Discharge Medications:  PAIN MANAGEMENT: Next Dose Available   Norco 5/325mg (Hydrocodone/Acetaminophen) (Pain Med) - every 4-6 hours AS NEEDED for pain  5pm   Robaxin/Methocarbamol 750mg (Muscle Relaxer) - Every 6-8 hours AS NEEDED for muscle spasms, thigh pain or additional pain control 7pm   COMPLICATION PREVENTION MEDS: Next Dose DUE   Aspirin 81mg twice a day for 6 weeks post-op for blood clot prevention PM on 3/9/24   MiraLAX 17gm - once or twice a day while on narcotics and muscle relaxers for constipation prevention PM on 3/9/24      Total Knee Replacement                                                                                                                                    PAIN MEDICATIONS/PAIN MANAGEMENT: (Use the medication log in your discharge packet to keep track of your medications)    Norco 5/325mg (Hydrocodone/Acetaminophen) (Pain pill) take 1/2 to 1 whole tablet as needed for pain. If only taking 1/2 tablet you may take 325mg of Tylenol (acetaminophen) with it for added pain control without effects of narcotics.    **NO MORE THAN 3000mg OF TYLENOL IN 24 HOURS**.     Robaxin/Methocarbamol 750mg (muscle relaxer)- you can take every 6-8 hours as needed for muscle spasms, thigh pain and stiffness, additional pain control or breakthrough pain medications. This medication is helpful for pain control while lessening your need for narcotics. Please reduce the use gradually as the pain and spasms lessen. DO NOT TAKE AT THE SAME TIME AS A PAIN PILL. YOU WILL BE BETTER SERVED WITH 2 HOURS BETWEEN PAIN PILL AND MUSCLE RELAXER.       **Other things that help with pain  control is WALKING, COMPRESSION WRAP, ICE and ELEVATION!!**    BLOOD CLOT PREVENTION:   Aspirin 81mg (blood thinner) - twice a day for 6 weeks for blood clot prevention. Start on the evening of 3/9/24. Stop on 4/19/24.  If you were taking Baby Aspirin 81mg prior to surgery, may return to daily dose AFTER 6 weeks - 4/20/24.  You need to continuing wearing your compression stocking (CHRISTIAN Hose - ThromboEmbolic Disease Prevention Device) for the next 2-6 weeks post-op. It is ok to remove them for hygiene and at bedtime.   Hand wash and Dry. **If the swelling persists in the legs after you stop wearing the Christian hose, continue to wear them until the swelling decreases.**  REMOVE STOCKINGS AT LEAST DAILY FOR SKIN ASSESSMENT.   Do NOT let the stockings roll down, creating a tourniquet around the back of your knee. If you need to, leave the excess at the bottom of the stocking.   The best thing you can do to prevent blood clots is to walk around as much as possible, AT LEAST EVERY 1-2 HOURS.       CONSTIPATION PREVENTION:   Miralax or Senokot S/Juanita-Colace and Stool softeners EVERY DAY while on pain meds.  Use other more aggressive over the counter LAXATIVES as needed for constipation (Examples: Milk of Magnesia, Dulcolax tabs or suppository, Magnesium Citrate, Fleet's Enema...etc.)   Drink lots of water.  Increase Fiber in diet.  Increase walking distance each day  DO NOT GO MORE THAN 2 DAYS WITHOUT HAVING A BOWEL MOVEMENT!    ACTIVITY:   Weight bearing precautions as follows:  FULL weight bearing to operative leg with walker.   DO NOT TAKE YOURSELF OFF OF THE WALKER TOO SOON. ALLOW YOUR OUTPATIENT THERAPIST or SURGEON TO GUIDE YOU.   Range of motion as tolerated. Work on BENDING and STRAIGHTENING your knee. Change positions often throughout the day. DO NOT PUT ANYTHING BEHIND THE KNEE KEEPING IT IN A BENT POSITION.   Elevate affected extremity way ABOVE THE LEVEL OF THE HEART to reduce swelling.  Walk around at least every  1-2 hours while awake.   No heavy lifting, pulling, pushing or straining.  Ice the Knee, thigh and lower leg AS MUCH AS POSSIBLE  Outpatient Physical Therapy - Bring prescription to clinic of choice as soon as possible.      WOUND CARE:   Aquacel dressing in place. This dressing can stay in place for up to 7 days. Change dressing on 3/14/24. Follow instructions on flyer for dressing changes.       Removal: (On Thursday)   Step 1: Remove the dressing - press down gently on the pad and carefully lift the edge of the dressing with the other hand   Step 2: Stretch the dressing parallel to break the adhesive seal   Step 3: Stretch all the way around the dressing   Step 4: Notify the surgeon if unable to remove the dressing and the adhesive around the wound.     Dry dressing changes every other day and as needed once the tan aquacell dressing is removed. Start 3/14/24.  NOTIFY MD OF EXCESSIVE WOUND DRAINAGE.     DO NOT WET WOUND or apply any ointments, creams, lotions or antiseptics.  Ace wrap - apply your compression stocking and apply the ace wrap where the stocking stops for extra added compression to the knee.   May wet incision AFTER 2 weeks- (after you follow-up with your surgeon).   Ok to shower before then if able to keep wound from getting wet (plastic barrier, saran wrap or cling wrap and tape).   DO NOT TOUCH INCISION      URINARY RETENTION:  If you start having difficulty urinating, decrease the use of Pain pills and muscle relaxers and notify your primary care doctor.     PNEUMONIA PREVENTION:  Stay out of bed as much as possible and walk around every 1-2 hours.  Continue breathing exercises (Incentive Spirometry) every 1-2 hours while mobility is limited and while you are on pain pills.    FALL PREVENTION:  Wear sturdy shoes that fit well - Wearing shoes with high heels or slippery soles, or shoes that are too loose, can lead to falls. Walking around in bare feet, or only socks, can also increase your risk  of falling.  Use walker as long as your surgeon and therapist recommend it  Use good lighting and  throw rugs, electrical cords, furniture and clutter (anything than can cause you to trip at home.   Non-slip rug in bathroom or shower      INFECTION PREVENTION:  Proper handwashing before and after dressing changes. Do not wet the wound. Wound care instructions as written above. NOTIFY MD OF EXCESSIVE WOUND DRAINAGE.  No alcohol, smoking or tobacco products  Pets should not be allowed around the wound or the dressing.   Treat UTI and skin infections as soon as possible.  Pre-medicate with antibiotics prior to dental or surgical procedures.   If you are diabetic, MAINTAIN GOOD BLOOD SUGAR CONTROL (Below 150) DURING YOUR RECOVERY. If you see high numbers, notify your primary care doctor.     Call your SURGEON'S OFFICE ([phone]) if you experience the following signs and symptoms of infection:   Unusual redness, swelling, excessive, cloudy or foul smelling drainage at the incision site.   Persistent low grade temp OR a temp greater than 102 F, unrelieved by Tylenol  Pain at surgical site, unrelieved by pain meds    Warning signs of a blood clot in your leg: (CALL YOUR SURGEON)  New onset or increasing pain in calf, new onset tenderness or redness above or below the knee or increasing swelling of your calf, ankle, or foot.  Warning signs that a blood clot has traveled to your lungs: (REPORT TO THE ER/CALL [phone])  Sudden or increase in Shortness of breath, sudden onset of chest pains, or  Localized chest pain with coughing.       IF ANY ISSUES ARISE AND YOU FEEL THE NEED TO CALL YOUR PRIMARY CARE DOCTOR, PLEASE LET YOUR SURGEON KNOW AS WELL.     For emergencies, please report to OUR ([facility] or [facility] main [facility]) Emergency department and tell them to call YOUR SURGEON at [phone].     BEFORE MAKING ANY CHANGES TO THE MEDICAL CARE PLAN OR GOING TO THE EMERGENCY ROOM, PLEASE CONTACT THE SURGEON.    3rd floor nursing  unit # [phone]  Use this number for questions about your discharge instructions or problems filling your discharge prescriptions.

## 2024-03-09 NOTE — PLAN OF CARE
Problem: Adult Inpatient Plan of Care  Goal: Plan of Care Review  Outcome: Met  Goal: Patient-Specific Goal (Individualized)  Outcome: Met  Goal: Absence of Hospital-Acquired Illness or Injury  Outcome: Met  Goal: Optimal Comfort and Wellbeing  Outcome: Met  Goal: Readiness for Transition of Care  Outcome: Met     Problem: Adjustment to Surgery (Knee Arthroplasty)  Goal: Optimal Coping  Outcome: Met     Problem: Bleeding (Knee Arthroplasty)  Goal: Absence of Bleeding  Outcome: Met     Problem: Bowel Motility Impaired (Knee Arthroplasty)  Goal: Effective Bowel Elimination  Outcome: Met     Problem: Fluid and Electrolyte Imbalance (Knee Arthroplasty)  Goal: Fluid and Electrolyte Balance  Outcome: Met     Problem: Functional Ability Impaired (Knee Arthroplasty)  Goal: Optimal Functional Ability  Outcome: Met     Problem: Infection (Knee Arthroplasty)  Goal: Absence of Infection Signs and Symptoms  Outcome: Met     Problem: Neurovascular Compromise (Knee Arthroplasty)  Goal: Intact Neurovascular Status  Outcome: Met     Problem: Ongoing Anesthesia Effects (Knee Arthroplasty)  Goal: Anesthesia/Sedation Recovery  Outcome: Met     Problem: Pain (Knee Arthroplasty)  Goal: Acceptable Pain Control  Outcome: Met     Problem: Postoperative Nausea and Vomiting (Knee Arthroplasty)  Goal: Nausea and Vomiting Relief  Outcome: Met     Problem: Postoperative Urinary Retention (Knee Arthroplasty)  Goal: Effective Urinary Elimination  Outcome: Met     Problem: Respiratory Compromise (Knee Arthroplasty)  Goal: Effective Oxygenation and Ventilation  Outcome: Met     Problem: Hypertension Comorbidity  Goal: Blood Pressure in Desired Range  Outcome: Met     Problem: Infection  Goal: Absence of Infection Signs and Symptoms  Outcome: Met

## 2024-03-09 NOTE — PT/OT/SLP PROGRESS
Called placed to patient yesterday. We dicussed the brain and neck MRI would be 2 different MRIs. She would like to hold off on the neck MRI for now as she does not have any major neck symptoms. She would like to proceed with head MRI.  This order was placed.  She will continue with the plan of care as previously discussed. Will notify patient of MRI results once available.    Physical Therapy Treatment    Patient Name:  Jeana Muhammad   MRN:  [id number]    Recommendations:     Discharge Recommendations: Low Intensity Therapy  Discharge Equipment Recommendations: walker, rolling  Barriers to discharge: None    Assessment:     Jeana Muhammad is a 78 y.o. female admitted with a medical diagnosis of Primary osteoarthritis of right knee.  She presents with the following impairments/functional limitations: weakness, impaired endurance, impaired functional mobility, decreased lower extremity function, pain, decreased ROM, orthopedic precautions .    Rehab Prognosis: Good; patient would benefit from acute skilled PT services to address these deficits and reach maximum level of function.    Recent Surgery: Procedure(s) (LRB):  ROBOTIC ARTHROPLASTY, KNEE, TOTAL (Right) 2 Days Post-Op    Plan:     During this hospitalization, patient to be seen BID to address the identified rehab impairments via gait training, therapeutic activities, therapeutic exercises and progress toward the following goals:    Plan of Care Expires:  03/14/24    Subjective     Chief Complaint: r knee stiffness  Patient/Family Comments/goals: to go home  Pain/Comfort:  Pain Rating 1: other (see comments) (unrated)  Location - Side 1: Right  Location 1: knee  Pain Addressed 1: Pre-medicate for activity, Distraction      Objective:     Communicated with RN prior to session.  Patient found up in chair with peripheral IV upon PT entry to room.     General Precautions: Standard, fall  Orthopedic Precautions: RLE weight bearing as tolerated  Braces: N/A  Respiratory Status: Room air     Functional Mobility:  Transfers:     Sit to Stand:  stand by assistance with rolling walker  Gait: The pt ambulated ~350 ft with RW and SBA. VC for increased R knee flexion, which improved with increased distance. Slightly slowed gait speed. No LOB noted.    Treatment & Education:  The pt performed x10 reps of TKA home exercise program on the RLE.  The  pt was educated on PT plan of care, home exercise program, safety with mobility, and importance of frequent mobility.    Patient left up in chair with all lines intact, call button in reach, RN notified, and spouse present..    GOALS:   Multidisciplinary Problems       Physical Therapy Goals          Problem: Physical Therapy    Goal Priority Disciplines Outcome Goal Variances Interventions   Physical Therapy Goal     PT, PT/OT Ongoing, Progressing     Description: Pt will improve functional independence by performing:    MET - Bed mobility: SBA  MET - Sit to stand: SBA with rolling walker  Bed to chair: SBA with Stand Step  with rolling walker   Car Transfer: SBA with rolling walker  MET - Ambulation x 200'  feet with SBA and rolling walker  1 Step (Curb): Min A  and rolling walker  MET - 3 Steps: Min A  and B HR  right knee AROM flexion (in degrees): 90  right knee AROM extension (in degrees): 0   Independent with total knee HEP                          Time Tracking:     PT Received On:    PT Start Time: 1350     PT Stop Time: 1420  PT Total Time (min): 30 min     Billable Minutes: Gait Training 15 and Therapeutic Exercise 15    Treatment Type: Treatment  PT/PTA: PT     Number of PTA visits since last PT visit: 0     03/09/2024

## 2024-03-09 NOTE — PLAN OF CARE
Problem: Adult Inpatient Plan of Care  Goal: Plan of Care Review  Outcome: Ongoing, Progressing  Goal: Patient-Specific Goal (Individualized)  Outcome: Ongoing, Progressing  Goal: Absence of Hospital-Acquired Illness or Injury  Outcome: Ongoing, Progressing  Goal: Optimal Comfort and Wellbeing  Outcome: Ongoing, Progressing  Goal: Readiness for Transition of Care  Outcome: Ongoing, Progressing     Problem: Adjustment to Surgery (Knee Arthroplasty)  Goal: Optimal Coping  Outcome: Ongoing, Progressing     Problem: Bleeding (Knee Arthroplasty)  Goal: Absence of Bleeding  Outcome: Ongoing, Progressing     Problem: Bowel Motility Impaired (Knee Arthroplasty)  Goal: Effective Bowel Elimination  Outcome: Ongoing, Progressing     Problem: Fluid and Electrolyte Imbalance (Knee Arthroplasty)  Goal: Fluid and Electrolyte Balance  Outcome: Ongoing, Progressing     Problem: Functional Ability Impaired (Knee Arthroplasty)  Goal: Optimal Functional Ability  Outcome: Ongoing, Progressing     Problem: Infection (Knee Arthroplasty)  Goal: Absence of Infection Signs and Symptoms  Outcome: Ongoing, Progressing     Problem: Neurovascular Compromise (Knee Arthroplasty)  Goal: Intact Neurovascular Status  Outcome: Ongoing, Progressing     Problem: Ongoing Anesthesia Effects (Knee Arthroplasty)  Goal: Anesthesia/Sedation Recovery  Outcome: Ongoing, Progressing     Problem: Pain (Knee Arthroplasty)  Goal: Acceptable Pain Control  Outcome: Ongoing, Progressing     Problem: Postoperative Nausea and Vomiting (Knee Arthroplasty)  Goal: Nausea and Vomiting Relief  Outcome: Ongoing, Progressing     Problem: Postoperative Urinary Retention (Knee Arthroplasty)  Goal: Effective Urinary Elimination  Outcome: Ongoing, Progressing     Problem: Respiratory Compromise (Knee Arthroplasty)  Goal: Effective Oxygenation and Ventilation  Outcome: Ongoing, Progressing     Problem: Hypertension Comorbidity  Goal: Blood Pressure in Desired Range  Outcome:  Ongoing, Progressing     Problem: Infection  Goal: Absence of Infection Signs and Symptoms  Outcome: Ongoing, Progressing

## 2024-03-12 ENCOUNTER — PATIENT OUTREACH (OUTPATIENT)
Dept: ADMINISTRATIVE | Facility: CLINIC | Age: 79
End: 2024-03-12
Payer: MEDICARE

## 2024-03-12 NOTE — PROGRESS NOTES
C3 nurse attempted to contact Jeana Muhammad  for a TCC post hospital discharge follow up call. No answer. Left voicemail with callback information. The patient has a scheduled HOSFU appointment with Be Reynoso MD (Orthopedic Surgery) on Friday 3/22/24 @ 10:45 am w/ Wisam ESQUIVEL

## 2024-03-13 NOTE — PROGRESS NOTES
C3 nurse spoke with Jeana Muhammad  for a TCC post hospital discharge follow up call. The patient has a scheduled HOSFU appointment with Be Reynoso MD (Orthopedic Surgery) on Friday 3/22/24 @ 10:45am w/ Wisam (Dr Reynoso's Phy Assistant)

## 2024-03-13 NOTE — PROGRESS NOTES
C3 nurse made second attempt to contact Jeana Muhammad for a TCC post hospital discharge follow up call.

## 2024-03-14 NOTE — DISCHARGE SUMMARY
[facility]  Discharge Note  Short Stay    Admit Date: 3/7/2024    Discharge Date and Time: 3/9/2024  3:05 PM     Attending Physician: No att. providers found     Discharge Provider: Tay Reynoso    Diagnoses:  Active Hospital Problems    Diagnosis  POA    *Primary osteoarthritis of right knee [M17.11]  Yes      Resolved Hospital Problems   No resolved problems to display.       Discharged Condition: good    Hospital Course:    Patient presented for elective robotic assisted right total knee arthroplasty The patient had no complications during the hospital stay and was discharged home in stable condition full weightbearing with assistance of a walker. The patient was given a consult for physical therapy and rehab as well as a follow-up appointment in our office in 2 weeks for reevaluation. The patient was instructed on wound care and dressing change as well as to continue the DUC hose while at home. Prescriptions for continuation of the VTE prophylaxis and pain control were given. The patients home medications were resumed please see discharge med rec for that.     Final Diagnoses: Same as principal problem.    Disposition: Home or Self Care    Follow up/Patient Instructions:    Medications:  Reconciled Home Medications:      Medication List        START taking these medications      aspirin 81 MG Chew  Take 1 tablet (81 mg total) by mouth 2 (two) times daily.     HYDROcodone-acetaminophen 5-325 mg per tablet  Commonly known as: NORCO  Take 1 tablet by mouth every 4 (four) hours as needed (as needed for pain score 6-10).     methocarbamoL 750 MG Tab  Commonly known as: ROBAXIN  Take 1 tablet (750 mg total) by mouth every 8 (eight) hours as needed (muscle spasm).     polyethylene glycol 17 gram Pwpk  Commonly known as: GLYCOLAX  Take 17 g by mouth every evening. for 14 days            CHANGE how you take these medications      fluticasone propionate 50 mcg/actuation nasal spray  Commonly known as:  FLONASE  2 sprays by Each Nare route 2 (two) times daily.  What changed:   when to take this  reasons to take this     ketorolac 0.5% 0.5 % Drop  Commonly known as: ACULAR  What changed: Another medication with the same name was removed. Continue taking this medication, and follow the directions you see here.            CONTINUE taking these medications      ascorbic acid (vitamin C) 500 MG tablet  Commonly known as: VITAMIN C  Take 500 mg by mouth once daily.     atorvastatin 10 MG tablet  Commonly known as: LIPITOR  Take 5 mg by mouth every evening.     busPIRone 7.5 MG tablet  Commonly known as: BUSPAR  Take 7.5 mg by mouth as needed.     cetirizine 5 MG tablet  Commonly known as: ZYRTEC  Take 5 mg by mouth once daily.     co-enzyme Q-10 50 mg capsule  Take 50 mg by mouth once daily.     dicyclomine 20 mg tablet  Commonly known as: BENTYL  Take 20 mg by mouth as needed.     famotidine 20 MG tablet  Commonly known as: PEPCID  Take 20 mg by mouth as needed.     hydrOXYzine HCL 25 MG tablet  Commonly known as: ATARAX  Take 25 mg by mouth nightly as needed.     LORazepam 1 MG tablet  Commonly known as: ATIVAN  Take 0.5 tablets (0.5 mg total) by mouth every 8 (eight) hours as needed for Anxiety.     magnesium oxide 400 mg (241.3 mg magnesium) tablet  Commonly known as: MAG-OX  Take 400 mg by mouth once daily.     milk thistle 175 mg tablet  Take 175 mg by mouth once daily.     pantoprazole 40 MG tablet  Commonly known as: PROTONIX  Take 40 mg by mouth daily as needed.     prednisoLONE acetate 1 % Drps  Commonly known as: PRED FORTE  Place into the right eye.     predniSONE 10 MG tablet  Commonly known as: DELTASONE  Take 1 tablet by mouth once daily.     PRESERVISION AREDS-2 250-90-40-1 mg Cap  Generic drug: vit C,A-Bx-lldyq-lutein-zeaxan  Take 1 tablet by mouth Daily.     tamsulosin 0.4 mg Cap  Commonly known as: FLOMAX  Take 1 capsule (0.4 mg total) by mouth once daily. for 30 doses     tobramycin sulfate 0.3% 0.3 %  ophthalmic solution  Commonly known as: TOBREX     vitamin D 1000 units Tab  Commonly known as: VITAMIN D3  Take 1,000 Units by mouth once daily.     zinc gluconate 50 mg tablet  Take 50 mg by mouth once daily.            STOP taking these medications      amoxicillin-clavulanate 875-125mg 875-125 mg per tablet  Commonly known as: AUGMENTIN     methylPREDNISolone 4 mg tablet  Commonly known as: MEDROL DOSEPACK            No discharge procedures on file.   Follow-up Information       Be Reynoso MD. Go on 3/22/2024.    Specialty: Orthopedic Surgery  Why: Ortho follow up appt on Friday 3/22/24 @ 10:45am w/ Wisam (Dr Reynoso's Phy Assistant)  Contact information:  [address]  [place] [address]  [phone]               [facility] Follow up.    Why: This is the equipment company. Call if you have questions or concerns.  Contact information:  [address] [facility] [address] [place] [address]  [phone]               [facility] Follow up.    Specialty: Physical Therapy  Why: This is the outpatient therapy facility. They will contact pt with appt date & time. Call if you have questions or concerns.  Contact information:  [address] [place] [id number] [phone]                             Discharge Procedure Orders (must include Diet, Follow-up, Activity):  No discharge procedures on file.

## 2024-03-16 LAB — VIEW PATHOLOGY REPORT (RELIAPATH): NORMAL

## 2024-03-18 DIAGNOSIS — Z96.651 S/P TOTAL KNEE REPLACEMENT, RIGHT: Primary | ICD-10-CM

## 2024-03-18 RX ORDER — METHOCARBAMOL 750 MG/1
750 TABLET, FILM COATED ORAL 3 TIMES DAILY
Qty: 21 TABLET | Refills: 0 | Status: SHIPPED | OUTPATIENT
Start: 2024-03-18 | End: 2024-03-25

## 2024-03-18 RX ORDER — HYDROCODONE BITARTRATE AND ACETAMINOPHEN 5; 325 MG/1; MG/1
1 TABLET ORAL EVERY 4 HOURS PRN
Qty: 42 TABLET | Refills: 0 | Status: SHIPPED | OUTPATIENT
Start: 2024-03-18 | End: 2024-03-25

## 2024-03-18 RX ORDER — METHOCARBAMOL 750 MG/1
750 TABLET, FILM COATED ORAL EVERY 8 HOURS PRN
Qty: 30 TABLET | Refills: 0 | Status: SHIPPED | OUTPATIENT
Start: 2024-03-18 | End: 2024-03-28 | Stop reason: SDUPTHER

## 2024-03-18 NOTE — TELEPHONE ENCOUNTER
Patient called asking for a refill of pain medicine.     I let the patient know that I will send a refill request to the provider.     She verbalized a clear understanding.

## 2024-03-22 ENCOUNTER — HOSPITAL ENCOUNTER (OUTPATIENT)
Dept: RADIOLOGY | Facility: CLINIC | Age: 79
Discharge: HOME | End: 2024-03-22
Attending: PHYSICIAN ASSISTANT
Payer: MEDICARE

## 2024-03-22 ENCOUNTER — OFFICE VISIT (OUTPATIENT)
Dept: ORTHOPEDICS | Facility: CLINIC | Age: 79
End: 2024-03-22
Payer: MEDICARE

## 2024-03-22 VITALS
SYSTOLIC BLOOD PRESSURE: 137 MMHG | WEIGHT: 185 LBS | BODY MASS INDEX: 29.73 KG/M2 | HEIGHT: 66 IN | HEART RATE: 73 BPM | DIASTOLIC BLOOD PRESSURE: 71 MMHG

## 2024-03-22 DIAGNOSIS — Z96.651 S/P TOTAL KNEE REPLACEMENT, RIGHT: Primary | ICD-10-CM

## 2024-03-22 DIAGNOSIS — Z96.651 S/P TOTAL KNEE REPLACEMENT, RIGHT: ICD-10-CM

## 2024-03-22 PROCEDURE — 99024 POSTOP FOLLOW-UP VISIT: CPT | Mod: ,,, | Performed by: PHYSICIAN ASSISTANT

## 2024-03-22 PROCEDURE — 3078F DIAST BP <80 MM HG: CPT | Mod: CPTII,,, | Performed by: PHYSICIAN ASSISTANT

## 2024-03-22 PROCEDURE — 73562 X-RAY EXAM OF KNEE 3: CPT | Mod: RT,,, | Performed by: PHYSICIAN ASSISTANT

## 2024-03-22 PROCEDURE — 1101F PT FALLS ASSESS-DOCD LE1/YR: CPT | Mod: CPTII,,, | Performed by: PHYSICIAN ASSISTANT

## 2024-03-22 PROCEDURE — 3288F FALL RISK ASSESSMENT DOCD: CPT | Mod: CPTII,,, | Performed by: PHYSICIAN ASSISTANT

## 2024-03-22 PROCEDURE — 1160F RVW MEDS BY RX/DR IN RCRD: CPT | Mod: CPTII,,, | Performed by: PHYSICIAN ASSISTANT

## 2024-03-22 PROCEDURE — 3075F SYST BP GE 130 - 139MM HG: CPT | Mod: CPTII,,, | Performed by: PHYSICIAN ASSISTANT

## 2024-03-22 PROCEDURE — 1125F AMNT PAIN NOTED PAIN PRSNT: CPT | Mod: CPTII,,, | Performed by: PHYSICIAN ASSISTANT

## 2024-03-22 PROCEDURE — 1159F MED LIST DOCD IN RCRD: CPT | Mod: CPTII,,, | Performed by: PHYSICIAN ASSISTANT

## 2024-03-22 RX ORDER — HYDROCODONE BITARTRATE AND ACETAMINOPHEN 7.5; 325 MG/1; MG/1
1 TABLET ORAL EVERY 4 HOURS PRN
Qty: 42 TABLET | Refills: 0 | Status: SHIPPED | OUTPATIENT
Start: 2024-03-22 | End: 2024-03-28 | Stop reason: SDUPTHER

## 2024-03-22 RX ORDER — IBUPROFEN 600 MG/1
600 TABLET ORAL 3 TIMES DAILY
Qty: 21 TABLET | Refills: 0 | Status: SHIPPED | OUTPATIENT
Start: 2024-03-22 | End: 2024-03-29

## 2024-03-22 NOTE — PROGRESS NOTES
Chief Complaint:   Chief Complaint   Patient presents with    Right Knee - Post-op Evaluation     Pt states she is experiencing significant pain and probably needs more pain medication. Pt states that on PT days her knee really bothers her with the pain going up to 8/10.        History of present illness:    78-year-old female presents office today for follow-up on her right knee.  She is 2 weeks S/P right total knee arthroplasty.  Overall she is doing well but does have moderate amount of pain and swelling.  She is ambulating with a walker and working with physical therapy.    Past Medical History:   Diagnosis Date    Arthritis     Calculus of kidney     Digestive disorder     Hyperlipemia     Liver disease     fatty liver and cyst on liver    Primary osteoarthritis of right knee 03/07/2024    Seasonal allergies        Past Surgical History:   Procedure Laterality Date    CATARACT EXTRACTION W/  INTRAOCULAR LENS IMPLANT Bilateral     COLONOSCOPY      EXTRACORPOREAL SHOCK WAVE LITHOTRIPSY Left 04/11/2023    Procedure: LITHOTRIPSY, ESWL Left  Renal Calculus  / Possible Bilateral Retrogrades;  Surgeon: Be Titus MD;  Location: [facility] OR;  Service: Urology;  Laterality: Left;    ROBOTIC ARTHROPLASTY, KNEE Right 3/7/2024    Procedure: ROBOTIC ARTHROPLASTY, KNEE, TOTAL;  Surgeon: Be Reynoso MD;  Location: [facility] OR;  Service: Orthopedics;  Laterality: Right;  Jose Armando    TONSILLECTOMY      TOTAL KNEE ARTHROPLASTY Left     TUBAL LIGATION         Current Outpatient Medications   Medication Sig    ascorbic acid, vitamin C, (VITAMIN C) 500 MG tablet Take 500 mg by mouth once daily.    aspirin 81 MG Chew Take 1 tablet (81 mg total) by mouth 2 (two) times daily.    atorvastatin (LIPITOR) 10 MG tablet Take 5 mg by mouth every evening.    busPIRone (BUSPAR) 7.5 MG tablet Take 7.5 mg by mouth as needed.    cetirizine (ZYRTEC) 5 MG tablet Take 5 mg by mouth once daily.    co-enzyme Q-10 50 mg capsule Take 50 mg by mouth  once daily.    famotidine (PEPCID) 20 MG tablet Take 20 mg by mouth as needed.    fluticasone (FLONASE) 50 mcg/actuation nasal spray 2 sprays by Each Nare route 2 (two) times daily.    HYDROcodone-acetaminophen (NORCO) 5-325 mg per tablet Take 1 tablet by mouth every 4 (four) hours as needed for Pain.    hydrOXYzine HCL (ATARAX) 25 MG tablet Take 25 mg by mouth nightly as needed.    ketorolac 0.5% (ACULAR) 0.5 % Drop     magnesium oxide (MAG-OX) 400 mg (241.3 mg magnesium) tablet Take 400 mg by mouth once daily.    methocarbamoL (ROBAXIN) 750 MG Tab Take 1 tablet (750 mg total) by mouth every 8 (eight) hours as needed (muscle spasm).    methocarbamoL (ROBAXIN) 750 MG Tab Take 1 tablet (750 mg total) by mouth 3 (three) times daily. for 7 days    milk thistle 175 mg tablet Take 175 mg by mouth once daily.    pantoprazole (PROTONIX) 40 MG tablet Take 40 mg by mouth daily as needed.    polyethylene glycol (GLYCOLAX) 17 gram PwPk Take 17 g by mouth every evening. for 14 days    prednisoLONE acetate (PRED FORTE) 1 % DrpS Place into the right eye.    predniSONE (DELTASONE) 10 MG tablet Take 1 tablet by mouth once daily.    tobramycin sulfate 0.3% (TOBREX) 0.3 % ophthalmic solution     vit C,T-Yj-yskkw-lutein-zeaxan (PRESERVISION AREDS-2) 250-90-40-1 mg Cap Take 1 tablet by mouth Daily.    vitamin D (VITAMIN D3) 1000 units Tab Take 1,000 Units by mouth once daily.    zinc gluconate 50 mg tablet Take 50 mg by mouth once daily.    dicyclomine (BENTYL) 20 mg tablet Take 20 mg by mouth as needed.    lorazepam (ATIVAN) 1 MG tablet Take 0.5 tablets (0.5 mg total) by mouth every 8 (eight) hours as needed for Anxiety.    tamsulosin (FLOMAX) 0.4 mg Cap Take 1 capsule (0.4 mg total) by mouth once daily. for 30 doses     No current facility-administered medications for this visit.       Review of patient's allergies indicates:   Allergen Reactions    Bactrim [sulfamethoxazole-trimethoprim] Nausea And Vomiting    Chlorhexidine       rash    Ezetimibe Itching    Levofloxacin Other (See Comments)    Lortab [hydrocodone-acetaminophen] Itching    Milk containing products (dairy)     Tylenol-codeine #3 [acetaminophen-codeine] Other (See Comments)       Family History   Problem Relation Age of Onset    Hypertension Mother     Hypertension Father     Hypertension Sister     Breast cancer Sister     Hypertension Brother     No Known Problems Maternal Aunt     No Known Problems Maternal Uncle     No Known Problems Paternal Aunt     No Known Problems Paternal Uncle     No Known Problems Maternal Grandmother     No Known Problems Maternal Grandfather     No Known Problems Paternal Grandmother     No Known Problems Paternal Grandfather     No Known Problems Other     Anesthesia problems Neg Hx     Broken bones Neg Hx     Cancer Neg Hx     Clotting disorder Neg Hx     Collagen disease Neg Hx     Diabetes Neg Hx     Dislocations Neg Hx     Osteoporosis Neg Hx     Rheumatologic disease Neg Hx     Scoliosis Neg Hx     Severe sprains Neg Hx        Social History     Socioeconomic History    Marital status:    Tobacco Use    Smoking status: Never    Smokeless tobacco: Never   Substance and Sexual Activity    Alcohol use: Never    Drug use: Never    Sexual activity: Yes     Partners: Male         Review of Systems:    Constitution:   Denies chills, fever, and sweats.  HENT:   Denies headaches or blurry vision.  Cardiovascular:  Denies chest pain or irregular heart beat.  Respiratory:   Denies cough or shortness of breath.  Gastrointestinal:  Denies abdominal pain, nausea, or vomiting.  Musculoskeletal:   Denies muscle cramps.  Neurological:   Denies dizziness or focal weakness.  Psychiatric/Behavior: Normal mental status.  Hematology/Lymph:  Denies bleeding problem or easy bruising/bleeding.  Skin:    Denies rash or suspicious lesions.    Examination:    Vital Signs:    Vitals:    03/22/24 1041 03/22/24 1045   BP:  137/71   Pulse:  73   Weight: 83.9 kg  "(185 lb) 83.9 kg (185 lb)   Height: 5' 6" (1.676 m) 5' 6" (1.676 m)   PainSc:    3       Body mass index is 29.86 kg/m².    Constitution:   Well-developed, well nourished patient in no acute distress.  Neurological:   Alert and oriented x 3 and cooperative to examination.     Psychiatric/Behavior: Normal mental status.  Respiratory:   No shortness of breath.  Nonlabored breathing  Cardiovascular:           Regular rate and rhythm  Eyes:    Extraoccular muscles intact  Skin:    No scars, rash or suspicious lesions.    Physical Exam:     Right Knee     Mild effusion, no redness    Surgical incision C/D/I with staples   Active flexion 90 degrees   Active extension 0 degrees    Thigh and calf compartments soft and compressible    NVI distally Weakness of the knee was observed.    X-Ray Knee:   Three views of the right knee were performed   IMPRESSIONS RADIOLOGY TEST     X-ray of knee was performed intact  knee implant        Assessment:     S/P total knee replacement, right  -     X-Ray Knee 3 View Right; Future; Expected date: 03/22/2024        Plan:      Staples removed today, Steri-Strips applied.  We will refill her pain medicine for breakthrough pain.  She will continue with physical therapy and transition from a walker to a cane once comfortable.  She will follow up in 3 months for repeat evaluation        Follow up in about 3 months (around 6/22/2024).    DISCLAIMER: This note may have been dictated using voice recognition software and may contain grammatical errors.     "

## 2024-03-28 DIAGNOSIS — Z96.651 S/P TOTAL KNEE REPLACEMENT, RIGHT: ICD-10-CM

## 2024-03-28 RX ORDER — HYDROCODONE BITARTRATE AND ACETAMINOPHEN 7.5; 325 MG/1; MG/1
1 TABLET ORAL EVERY 6 HOURS PRN
Qty: 28 TABLET | Refills: 0 | Status: SHIPPED | OUTPATIENT
Start: 2024-03-28 | End: 2024-04-01 | Stop reason: SDUPTHER

## 2024-03-28 RX ORDER — METHOCARBAMOL 750 MG/1
750 TABLET, FILM COATED ORAL EVERY 8 HOURS PRN
Qty: 30 TABLET | Refills: 0 | Status: SHIPPED | OUTPATIENT
Start: 2024-03-28 | End: 2024-04-01 | Stop reason: SDUPTHER

## 2024-03-28 NOTE — ADDENDUM NOTE
Addendum  created 03/28/24 1241 by Joanne Herrera MD    Clinical Note Signed, Diagnosis association updated, Intraprocedure Blocks edited, SmartForm saved

## 2024-03-28 NOTE — TELEPHONE ENCOUNTER
Patient called stating that she would like a refill of her pain medicine and muscle relaxants.     I called the patient to let her know that I received her voice mail and that I will send a refill request to the provider.     She verbalized a clear understanding.

## 2024-04-01 DIAGNOSIS — Z96.651 S/P TOTAL KNEE REPLACEMENT, RIGHT: ICD-10-CM

## 2024-04-01 RX ORDER — HYDROCODONE BITARTRATE AND ACETAMINOPHEN 7.5; 325 MG/1; MG/1
1 TABLET ORAL EVERY 6 HOURS PRN
Qty: 28 TABLET | Refills: 0 | Status: SHIPPED | OUTPATIENT
Start: 2024-04-01 | End: 2024-04-08 | Stop reason: SDUPTHER

## 2024-04-01 RX ORDER — METHOCARBAMOL 750 MG/1
750 TABLET, FILM COATED ORAL EVERY 8 HOURS PRN
Qty: 30 TABLET | Refills: 0 | Status: SHIPPED | OUTPATIENT
Start: 2024-04-01 | End: 2024-04-11

## 2024-04-01 NOTE — TELEPHONE ENCOUNTER
Patient called needing a refill of her pain medications  Had some issues with [facility] wanted to switch it to the pharmacy in [facility] [place]   Spoke with the patient to let her know that it was a mess up on our end it was printed script instead of an electronic script will try it again just keep us updated had also let the patient know there is a nationwide shortage  Patient agreed and acknowledged with this plan .

## 2024-04-04 NOTE — ADDENDUM NOTE
Addendum  created 04/04/24 1733 by Shahid Bhakta MD    Clinical Note Signed, Diagnosis association updated, Intraprocedure Blocks edited, SmartForm saved

## 2024-04-08 DIAGNOSIS — Z96.651 S/P TOTAL KNEE REPLACEMENT, RIGHT: ICD-10-CM

## 2024-04-08 RX ORDER — HYDROCODONE BITARTRATE AND ACETAMINOPHEN 7.5; 325 MG/1; MG/1
1 TABLET ORAL EVERY 6 HOURS PRN
Qty: 28 TABLET | Refills: 0 | Status: SHIPPED | OUTPATIENT
Start: 2024-04-08 | End: 2024-04-15

## 2024-04-22 ENCOUNTER — TELEPHONE (OUTPATIENT)
Dept: ORTHOPEDICS | Facility: CLINIC | Age: 79
End: 2024-04-22
Payer: MEDICARE

## 2024-04-22 NOTE — TELEPHONE ENCOUNTER
Patient called had some questions about her therapy orders attempted to call patient back the phone rang with no voicemail attached

## 2024-04-23 DIAGNOSIS — Z96.651 S/P TOTAL KNEE REPLACEMENT, RIGHT: Primary | ICD-10-CM

## 2024-06-26 ENCOUNTER — OFFICE VISIT (OUTPATIENT)
Dept: ORTHOPEDICS | Facility: CLINIC | Age: 79
End: 2024-06-26
Payer: MEDICARE

## 2024-06-26 DIAGNOSIS — Z96.651 S/P TOTAL KNEE REPLACEMENT, RIGHT: Primary | ICD-10-CM

## 2024-06-26 PROCEDURE — 3288F FALL RISK ASSESSMENT DOCD: CPT | Mod: CPTII,,, | Performed by: SPECIALIST

## 2024-06-26 PROCEDURE — 1159F MED LIST DOCD IN RCRD: CPT | Mod: CPTII,,, | Performed by: SPECIALIST

## 2024-06-26 PROCEDURE — 1101F PT FALLS ASSESS-DOCD LE1/YR: CPT | Mod: CPTII,,, | Performed by: SPECIALIST

## 2024-06-26 PROCEDURE — 99213 OFFICE O/P EST LOW 20 MIN: CPT | Mod: ,,, | Performed by: SPECIALIST

## 2024-06-26 NOTE — PROGRESS NOTES
Past Medical History:   Diagnosis Date    Arthritis     Calculus of kidney     Digestive disorder     Hyperlipemia     Liver disease     fatty liver and cyst on liver    Primary osteoarthritis of right knee 03/07/2024    Seasonal allergies        Past Surgical History:   Procedure Laterality Date    CATARACT EXTRACTION W/  INTRAOCULAR LENS IMPLANT Bilateral     COLONOSCOPY      EXTRACORPOREAL SHOCK WAVE LITHOTRIPSY Left 04/11/2023    Procedure: LITHOTRIPSY, ESWL Left  Renal Calculus  / Possible Bilateral Retrogrades;  Surgeon: Be Titus MD;  Location: [facility];  Service: Urology;  Laterality: Left;    ROBOTIC ARTHROPLASTY, KNEE Right 3/7/2024    Procedure: ROBOTIC ARTHROPLASTY, KNEE, TOTAL;  Surgeon: Be Reynoso MD;  Location: [facility];  Service: Orthopedics;  Laterality: Right;  Jose Armando    TONSILLECTOMY      TOTAL KNEE ARTHROPLASTY Left     TUBAL LIGATION         Current Outpatient Medications   Medication Sig    ascorbic acid, vitamin C, (VITAMIN C) 500 MG tablet Take 500 mg by mouth once daily.    atorvastatin (LIPITOR) 10 MG tablet Take 5 mg by mouth every evening.    busPIRone (BUSPAR) 7.5 MG tablet Take 7.5 mg by mouth as needed.    cetirizine (ZYRTEC) 5 MG tablet Take 5 mg by mouth once daily.    co-enzyme Q-10 50 mg capsule Take 50 mg by mouth once daily.    dicyclomine (BENTYL) 20 mg tablet Take 20 mg by mouth as needed.    famotidine (PEPCID) 20 MG tablet Take 20 mg by mouth as needed.    fluticasone (FLONASE) 50 mcg/actuation nasal spray 2 sprays by Each Nare route 2 (two) times daily.    hydrOXYzine HCL (ATARAX) 25 MG tablet Take 25 mg by mouth nightly as needed.    ketorolac 0.5% (ACULAR) 0.5 % Drop     magnesium oxide (MAG-OX) 400 mg (241.3 mg magnesium) tablet Take 400 mg by mouth once daily.    milk thistle 175 mg tablet Take 175 mg by mouth once daily.    pantoprazole (PROTONIX) 40 MG tablet Take 40 mg by mouth daily as needed.    prednisoLONE acetate (PRED FORTE) 1 % DrpS Place into the  right eye.    predniSONE (DELTASONE) 10 MG tablet Take 1 tablet by mouth once daily.    tobramycin sulfate 0.3% (TOBREX) 0.3 % ophthalmic solution     vit C,W-Cu-gxouh-lutein-zeaxan (PRESERVISION AREDS-2) 250-90-40-1 mg Cap Take 1 tablet by mouth Daily.    vitamin D (VITAMIN D3) 1000 units Tab Take 1,000 Units by mouth once daily.    zinc gluconate 50 mg tablet Take 50 mg by mouth once daily.    aspirin 81 MG Chew Take 1 tablet (81 mg total) by mouth 2 (two) times daily.    lorazepam (ATIVAN) 1 MG tablet Take 0.5 tablets (0.5 mg total) by mouth every 8 (eight) hours as needed for Anxiety.    tamsulosin (FLOMAX) 0.4 mg Cap Take 1 capsule (0.4 mg total) by mouth once daily. for 30 doses     No current facility-administered medications for this visit.       Review of patient's allergies indicates:   Allergen Reactions    Bactrim [sulfamethoxazole-trimethoprim] Nausea And Vomiting    Chlorhexidine      rash    Ezetimibe Itching    Levofloxacin Other (See Comments)    Lortab [hydrocodone-acetaminophen] Itching    Milk containing products (dairy)     Tylenol-codeine #3 [acetaminophen-codeine] Other (See Comments)       Family History   Problem Relation Name Age of Onset    Hypertension Mother      Hypertension Father      Hypertension Sister      Breast cancer Sister      Hypertension Brother      No Known Problems Maternal Aunt      No Known Problems Maternal Uncle      No Known Problems Paternal Aunt      No Known Problems Paternal Uncle      No Known Problems Maternal Grandmother      No Known Problems Maternal Grandfather      No Known Problems Paternal Grandmother      No Known Problems Paternal Grandfather      No Known Problems Other      Anesthesia problems Neg Hx      Broken bones Neg Hx      Cancer Neg Hx      Clotting disorder Neg Hx      Collagen disease Neg Hx      Diabetes Neg Hx      Dislocations Neg Hx      Osteoporosis Neg Hx      Rheumatologic disease Neg Hx      Scoliosis Neg Hx      Severe sprains Neg  Hx         Social History     Socioeconomic History    Marital status:    Tobacco Use    Smoking status: Never    Smokeless tobacco: Never   Substance and Sexual Activity    Alcohol use: Never    Drug use: Never    Sexual activity: Yes     Partners: Male       Chief Complaint:   Chief Complaint   Patient presents with    Follow-up     Follow up R TKA JARETH COWANX 3/7/24-6/5/24. States that she is back to normal activities. States that she is still doing PT and normal exercises. Complaints of slight stiffness.        Consulting Physician: No ref. provider found    History of present illness:    This is a 79 y.o. year old female who is doing well 3 months postop from right total knee arthroplasty.  No complaints    Review of Systems:    Constitution:   Denies chills, fever, and sweats.  HENT:   Denies headaches or blurry vision.  Cardiovascular:  Denies chest pain or irregular heart beat.  Respiratory:   Denies cough or shortness of breath.  Gastrointestinal:  Denies abdominal pain, nausea, or vomiting.  Musculoskeletal:   Denies muscle cramps.  Neurological:   Denies dizziness or focal weakness.  Psychiatric/Behavior: Normal mental status.  Hematology/Lymph:  Denies bleeding problem or easy bruising/bleeding.  Skin:    Denies rash or suspicious lesions.    Examination:    Vital Signs:  There were no vitals filed for this visit.    There is no height or weight on file to calculate BMI.    Constitution:   Well-developed, well nourished patient in no acute distress.  Neurological:   Alert and oriented x 3 and cooperative to examination.     Psychiatric/Behavior: Normal mental status.  Respiratory:   No shortness of breath.non labored breathing.  Cardiovascular: Regular rate and rhythm  Eyes:    Extraoccular muscles intact  Skin:    No scars, rash or suspicious lesions.    Physical Exam:  Right Knee     No swelling, no erythema, no increase heat    No tenderness    Well healed scar    Symmetrically balanced collateral  ligaments throughout ROM    No instability of the knee in mid or deep flexion     Active flexion 115 degrees     Active extension 0 degrees     No weakness observed.    Normal gait    Intact sensory and motor function    Palpable pedal pulses            Assessment: S/P total knee replacement, right        Plan:  Activities as tolerated   Home exercises   Follow up in 1 year for clinical and radiographic exam.      DISCLAIMER: This note may have been dictated using voice recognition software and may contain grammatical errors.     NOTE: Consult report sent to referring provider via [employer] EMR.

## 2024-09-13 ENCOUNTER — LAB VISIT (OUTPATIENT)
Dept: LAB | Facility: HOSPITAL | Age: 79
End: 2024-09-13
Attending: INTERNAL MEDICINE
Payer: MEDICARE

## 2024-09-13 DIAGNOSIS — Z12.11 SPECIAL SCREENING FOR MALIGNANT NEOPLASMS, COLON: ICD-10-CM

## 2024-09-13 DIAGNOSIS — K76.0 FATTY METAMORPHOSIS OF LIVER: ICD-10-CM

## 2024-09-13 DIAGNOSIS — E83.52 HYPERCALCEMIA: ICD-10-CM

## 2024-09-13 DIAGNOSIS — R74.8 ACID PHOSPHATASE ELEVATED: Primary | ICD-10-CM

## 2024-09-13 DIAGNOSIS — E66.3 SEVERELY OVERWEIGHT: ICD-10-CM

## 2024-09-13 LAB
ALBUMIN SERPL-MCNC: 3.9 G/DL (ref 3.4–4.8)
ALBUMIN/GLOB SERPL: 1.5 RATIO (ref 1.1–2)
ALP SERPL-CCNC: 142 UNIT/L (ref 40–150)
ALT SERPL-CCNC: 15 UNIT/L (ref 0–55)
ANION GAP SERPL CALC-SCNC: 6 MEQ/L
AST SERPL-CCNC: 15 UNIT/L (ref 5–34)
BILIRUB SERPL-MCNC: 0.3 MG/DL
BUN SERPL-MCNC: 12.6 MG/DL (ref 9.8–20.1)
CALCIUM SERPL-MCNC: 10.4 MG/DL (ref 8.4–10.2)
CHLORIDE SERPL-SCNC: 108 MMOL/L (ref 98–107)
CO2 SERPL-SCNC: 29 MMOL/L (ref 23–31)
CREAT SERPL-MCNC: 0.62 MG/DL (ref 0.55–1.02)
CREAT/UREA NIT SERPL: 20
GFR SERPLBLD CREATININE-BSD FMLA CKD-EPI: >60 ML/MIN/1.73/M2
GGT SERPL-CCNC: 22 U/L (ref 9–36)
GLOBULIN SER-MCNC: 2.6 GM/DL (ref 2.4–3.5)
GLUCOSE SERPL-MCNC: 95 MG/DL (ref 82–115)
POTASSIUM SERPL-SCNC: 4.4 MMOL/L (ref 3.5–5.1)
PROT SERPL-MCNC: 6.5 GM/DL (ref 5.8–7.6)
SODIUM SERPL-SCNC: 143 MMOL/L (ref 136–145)

## 2024-09-13 PROCEDURE — 36415 COLL VENOUS BLD VENIPUNCTURE: CPT

## 2024-09-13 PROCEDURE — 80053 COMPREHEN METABOLIC PANEL: CPT

## 2024-09-13 PROCEDURE — 82977 ASSAY OF GGT: CPT

## 2025-01-24 ENCOUNTER — TELEPHONE (OUTPATIENT)
Dept: ENDOCRINOLOGY | Facility: CLINIC | Age: 80
End: 2025-01-24
Payer: MEDICARE

## 2025-01-24 NOTE — TELEPHONE ENCOUNTER
Called and spoke with patient informed of recommendation patient states she will follow up at next appointment.   Telephone Encounter by Mary Ann Kinney CMA at 11/29/17 04:04 PM     Author:  Mary Ann Kinney CMA Service:  (none) Author Type:  Certified Medical Assistant     Filed:  11/29/17 04:04 PM Encounter Date:  11/29/2017 Status:  Signed     :  Mary Ann Kinney CMA (Certified Medical Assistant)       From: Ravi Kang  To: Jacey Munoz DO  Sent: 11/29/2017  9:38 AM CST  Subject: Medication Questions    Doc, we spoke about 10mg vs 20mg of Prozac and I said I was fine on 10.    We left the dosage at 10.  Work is over powering and I'm feeling more   depressed.  Not to the point I was before the Prozac, but not as happy as   before.  What are your thoughts of increasing the dosage?  I expect this   level of work stress to continue in to February of next year.    Looking for your guidance.     Aguilar       Revision History        Date/Time User Provider Type Action    > 11/29/17 04:04 PM Mary Ann Kinney CMA Certified Medical Assistant Sign    Attribution information within the note text is not available.

## 2025-01-28 ENCOUNTER — LAB VISIT (OUTPATIENT)
Dept: LAB | Facility: HOSPITAL | Age: 80
End: 2025-01-28
Attending: NURSE PRACTITIONER
Payer: MEDICARE

## 2025-01-28 ENCOUNTER — OFFICE VISIT (OUTPATIENT)
Dept: ENDOCRINOLOGY | Facility: CLINIC | Age: 80
End: 2025-01-28
Payer: MEDICARE

## 2025-01-28 VITALS
SYSTOLIC BLOOD PRESSURE: 137 MMHG | RESPIRATION RATE: 18 BRPM | DIASTOLIC BLOOD PRESSURE: 86 MMHG | WEIGHT: 179.88 LBS | BODY MASS INDEX: 28.91 KG/M2 | TEMPERATURE: 98 F | HEIGHT: 66 IN | HEART RATE: 61 BPM

## 2025-01-28 DIAGNOSIS — E83.52 HYPERCALCEMIA: ICD-10-CM

## 2025-01-28 DIAGNOSIS — E55.9 VITAMIN D DEFICIENCY: ICD-10-CM

## 2025-01-28 DIAGNOSIS — E21.3 HYPERPARATHYROIDISM: ICD-10-CM

## 2025-01-28 DIAGNOSIS — E21.3 HYPERPARATHYROIDISM: Primary | ICD-10-CM

## 2025-01-28 LAB
25(OH)D3+25(OH)D2 SERPL-MCNC: 80 NG/ML (ref 30–80)
ALBUMIN SERPL-MCNC: 4.1 G/DL (ref 3.4–4.8)
BUN SERPL-MCNC: 12 MG/DL (ref 9.8–20.1)
CALCIUM SERPL-MCNC: 10.5 MG/DL (ref 8.4–10.2)
CHLORIDE SERPL-SCNC: 105 MMOL/L (ref 98–107)
CO2 SERPL-SCNC: 28 MMOL/L (ref 23–31)
CREAT SERPL-MCNC: 0.6 MG/DL (ref 0.55–1.02)
GFR SERPLBLD CREATININE-BSD FMLA CKD-EPI: >60 ML/MIN/1.73/M2
GLUCOSE SERPL-MCNC: 85 MG/DL (ref 82–115)
MAGNESIUM SERPL-MCNC: 2.2 MG/DL (ref 1.6–2.6)
PHOSPHATE SERPL-MCNC: 2.9 MG/DL (ref 2.3–4.7)
POTASSIUM SERPL-SCNC: 4.1 MMOL/L (ref 3.5–5.1)
PTH-INTACT SERPL-MCNC: 130 PG/ML (ref 8.7–77)
SODIUM SERPL-SCNC: 139 MMOL/L (ref 136–145)

## 2025-01-28 PROCEDURE — 1126F AMNT PAIN NOTED NONE PRSNT: CPT | Mod: CPTII,,, | Performed by: NURSE PRACTITIONER

## 2025-01-28 PROCEDURE — 3288F FALL RISK ASSESSMENT DOCD: CPT | Mod: CPTII,,, | Performed by: NURSE PRACTITIONER

## 2025-01-28 PROCEDURE — 99214 OFFICE O/P EST MOD 30 MIN: CPT | Mod: S$PBB,,, | Performed by: NURSE PRACTITIONER

## 2025-01-28 PROCEDURE — 83970 ASSAY OF PARATHORMONE: CPT

## 2025-01-28 PROCEDURE — 3075F SYST BP GE 130 - 139MM HG: CPT | Mod: CPTII,,, | Performed by: NURSE PRACTITIONER

## 2025-01-28 PROCEDURE — 1160F RVW MEDS BY RX/DR IN RCRD: CPT | Mod: CPTII,,, | Performed by: NURSE PRACTITIONER

## 2025-01-28 PROCEDURE — 83735 ASSAY OF MAGNESIUM: CPT

## 2025-01-28 PROCEDURE — 1159F MED LIST DOCD IN RCRD: CPT | Mod: CPTII,,, | Performed by: NURSE PRACTITIONER

## 2025-01-28 PROCEDURE — 36415 COLL VENOUS BLD VENIPUNCTURE: CPT

## 2025-01-28 PROCEDURE — 1101F PT FALLS ASSESS-DOCD LE1/YR: CPT | Mod: CPTII,,, | Performed by: NURSE PRACTITIONER

## 2025-01-28 PROCEDURE — 99214 OFFICE O/P EST MOD 30 MIN: CPT | Mod: PBBFAC | Performed by: NURSE PRACTITIONER

## 2025-01-28 PROCEDURE — 3079F DIAST BP 80-89 MM HG: CPT | Mod: CPTII,,, | Performed by: NURSE PRACTITIONER

## 2025-01-28 PROCEDURE — 82306 VITAMIN D 25 HYDROXY: CPT

## 2025-01-28 PROCEDURE — 80069 RENAL FUNCTION PANEL: CPT

## 2025-01-28 RX ORDER — PRAVASTATIN SODIUM 10 MG/1
10 TABLET ORAL DAILY
COMMUNITY

## 2025-01-28 NOTE — PROGRESS NOTES
Subjective     Patient ID: Jeana Muhammad is a 79 y.o. female.    Chief Complaint: Hyperparathyroidism       [facility] 3/21/2022 endocrine clinic note: 76-year-old female scheduled today as new patient referral to endocrine clinic history of hyperparathyroidism. history of hypercalcemia, hyperparathyroidism and osteopenia. Patient's most recent labs 10/5/2021 calcium level 10.7, corrected calcium 10.4, albumin level 4.4, PTH 96.8 on 10/05/2021.  Patient started with hypercalcemia 11/22/2019 previously patient had low calcium levels and normal calcium levels.  Patient reports she was previously on calcium replacement which was stopped by her primary care provider she is unsure when may have been a couple months ago.  Patient denies a previous history of a kidney stone which was over 10 years ago.  She denies any recent fractures.  Patient had a bone density 7/22/2020 IMPRESSION:  Normal mineralization is noted within the spine, not significantly changed.  Osteopenia is noted in the right hip, not significantly changed. Osteopenia is noted within the left hip with mild worsening in bone density.  Patient denies any palpable nodules to the neck she denies any muscle cramps or dystonia.     Endocrine Clinic Note:  07/25/2022: 76-year-old female scheduled today for endocrine clinic follow-up.  History of hyperparathyroidism with hypercalcemia vitamin-D deficiency.  Workup for hyperparathyroidism again.  Initially Calcium 10.7 albumin 4.4., PTH 96.8 on 10/05/2021 Patient's vitamin-D was increased repeat labs 03/24/2022 PTH decreased to 82, calcium 10.1.  In vitamin-D 35. Since patient was increased to vitamin D3 2000 IU per day patient was to be taking 1 tablet per day but states she takes 3 per day equaling 6000 IU per day.  Instructed patient will repeat a vitamin-D level today ensure that her vitamin-D level is not too high.  Patient had ultrasound of the thyroid 06/01/2022 no parathyroid adenoma was seen.  Patient had  NM parathyroid of on 07/15/2022 parathyroid adenoma was seen.  Patient denies any palpable masses.  She denies any muscle cramps or dystonia.  Patient denies any fractures or kidney stones no previous visit.       Endocrine clinic note 07/21/2023:  78-year-old female scheduled today for endocrine clinic follow-up.  History of hyperparathyroidism with hypercalcemia and vitamin-D deficiency.  Recent calcium level increased Calcium 10.8 on 03/14/2023, PTH 96.8 on 10/05/2021  Repeat levels on vitamin D 03/24/2022  PTH decreased to 82, calcium 10.1, Ultrasound of thyroid 06/01/2022 no adenoma seen  NM parathyroid 06/01/2022 no adenoma seen.  Previously on patient's visit she denied any history of kidney stones.  Patient was recently treated in April of 2023 for a 9 mm left kidney stone.  Patient's elevated calcium level and kidney stone we will check CT 4D parathyroid continue workup.  Patient is asymptomatic she denies any muscle tingling or dystonia.  Vitamin-D deficiency vitamin-D corrected to 45 on 04/10/2023.  Osteopenia Bone Density 7/22/2020 IMPRESSION:  Normal mineralization is noted within the spine, not significantly changed.  Osteopenia is noted in the right hip, not significantly changed. Osteopenia is noted within the left hip with mild worsening in bone density.  Patient denies any fractures since her previous visit will Repeat 2 year bone density.        Endocrine clinic note 01/28/2025:  79 year female scheduled today for endocrine clinic follow-up.  History of hyperparathyroidism with hypercalcemia and vitamin-D deficiency.  Hyperparathyroidism Calcium 10.4 on 09/13/2024, .4 on 07/21/2024, Ultrasound of thyroid 06/01/2022 no adenoma seen. NM parathyroid 06/01/2022 no adenoma seen. CT 4D parathyroid 08/10/2023 no adenoma seen.  Patient states recently she has stopped all supplements and started a multivitamin for women and calcium level elevated she states she checked bottle and it had 300 mg of  calcium and she stopped the vitamins.  She denies any tingling or dystonia or any recent renal stones.  Bone density was repeated on 07/15/2022 Impression: 1.  Normal bone mineral density of lumbar vertebrae. 2.  Bone mineral density of the femurs consistent with osteopenia.  Patient denies any fractures from her previous visit.          Narrative & Impression  EXAMINATION:  US THYROID     CLINICAL HISTORY:  , Hyperparathyroidism, unspecified.     TECHNIQUE:  Multiple transverse and sagittal grayscale sonographic images were acquired through the thyroid gland.     FINDINGS:  Grayscale imaging color flow Doppler images are available for interpretation.     Examination reveals right hemithyroid to measured 3.8 x 1.9 x 1.4 cm, left ginger thyroid measured 3.5 x 8 mm x 1.2 cm isthmus measures 2 mm in thickness.     There is slight irregularity of the posterior contour of the thyroid on the right with a small isoechoic nodule that measures 1.3 x 1.1 x 1.1 cm these  might be related to the parathyroid other imaging modalities might prove helpful for further assessment.     Small subcentimeter nodule identified in the left ginger thyroid measuring 5 mm x 5 mm by 6 mm     Impression:     Slight irregularity of the posterior aspect of the right ginger thyroid with a isoechoic nodule that measures 1.3 x 1.1 x 1.1 cm these might be related to the parathyroid other imaging modalities might prove helpful for further evaluation.     Subcentimeter nodule in the left gigner thyroid does not meet criteria for biopsy        Electronically signed by: Sid Dorantes  Date:                                            06/01/2022  Time:                                           14:20              Exam Ended: 06/01/22 14:12 Last Resulted: 06/01/22 14:20           Narrative & Impression  EXAMINATION:  NM PARATHYROID SCAN PLANAR     CLINICAL HISTORY:  Hyperparathyroidism, unspecified;     TECHNIQUE:  Intravenous administration of 24.2 mCi Tc-99m  labeled sestamibi was performed uneventfully.  Planar and SPECT scintigraphic images of the neck were subsequently obtained immediately and 2 hours after radiotracer injection.     COMPARISON:  None available at the time of initial interpretation.     FINDINGS:  There is normal physiologic distribution of radiotracer within the salivary and thyroid glands on the initial imaging.     No focus of abnormal radiotracer activity is appreciated on the initial images.  There are no persistent or otherwise suspicious areas of uptake on the delayed acquisitions.  No unexpected regional radiotracer uptake is identified to raise suspicion of possible ectopic parathyroid or thyroid tissue.        Impression:        No scintigraphic evidence of abnormal parathyroid activity.        Electronically signed by: Delano Ferrer  Date:                                            07/15/2022  Time:                                           12:19              Exam Ended: 07/15/22 12:03 Last Resulted: 07/15/22 12:19      Result Notes  Details        Reading PhysicianReading DateResult Priority  Sid Dorantes MD  [phone]/11/2023Routine     Narrative & Impression  EXAMINATION:  XR ABDOMEN AP OBLIQUE AND CONE     CLINICAL HISTORY:  pre lithotripsy;     COMPARISON:  None     FINDINGS:  Examination reveals some residual feces throughout the colon gas pattern is nonspecific with no clear evidence of ileus or obstruction some calcified densities are identified projecting over the expected contour of the left kidney largest measuring approximately 9 mm likely representing a calculus no other definite abnormalities identified     Impression:     Nephrolithiasis        Electronically signed by: Sid Dorantes  Date:                                            04/11/2023  Time:                                           14:03           Exam Ended: 04/11/23 12:49Last Resulted: 04/11/23 14:03            2 Result Notes  Details      Reading  Physician Reading Date Result Priority  Delia Owen MD  [phone] 8/10/2023 Routine    Narrative & Impression  EXAMINATION:  CT NECK PARATHYROID (4D)     CLINICAL HISTORY:  Hyperparathyroidism, unspecified     TECHNIQUE:  Axial CT images of the neck were obtained before and after intravenous contrast in the arterial and venous phases using a parathyroid protocol.  Reformatted images in the sagittal and coronal plane were included.     Automatic exposure control was utilized to limit radiation dose.     DLP: 1787 mGy-cm     COMPARISON:  Nuclear medicine scan dated 07/15/2022     FINDINGS:  There is no definite suspicious nodule identified.  A nodule which extends posterior to the right thyroid lobe appears congruent with thyroid and demonstrates an enhancement pattern similar to the thyroid gland.  There is no cervical lymphadenopathy.     The airways are patent.  The parotid glands, submandibular glands and thyroid gland otherwise unremarkable.  The major vessels in the neck enhance normally.  There is no destructive bone lesion.  The lung apices are clear.     Impression:     No definite suspicious nodule identified.        Electronically signed by:Delia Owen  Date:                                            08/10/2023  Time:                                           13:15      Exam Ended: 08/09/23 13:10 CDT Last Resulted: 08/10/23 13:15 CDT      Order Details        View Encounter        Lab and Collection Details        Routing        Result History    View All Conversations on this Encounter      DXA Bone Density Appendicular Skeleton  Order: [id number]  Status: Final result       Visible to patient: No (inaccessible in MyChart)       Next appt: 03/26/2025 at 12:45 PM in Orthopedics (Tay Reynoso MD)       Dx: Hyperparathyroidism; Osteopenia, unsp...    2 Result Notes       1 HM Topic  Details      Reading Physician Reading Date Result Priority  Naif Garcia,  MD  [phone] 8/9/2023 Routine    Narrative & Impression  EXAMINATION:  DEXA BONE DENSITY APPENDICULAR SKELETON     CLINICAL HISTORY:  Hyperparathyroidism, unspecified     TECHNIQUE:  DXA scanning was performed over the femurs and lumbar spine.  Review of the images confirms satisfactory positioning and technique.     COMPARISON:  None     FINDINGS:  The average T-score of lumbar vertebrae is -0.5 which is within normal range.     The average T-score of the femurs is -1.7 which is in the range of moderate osteopenia.     Impression:     1.  Normal bone mineral density of lumbar vertebrae.     2.  Bone mineral density of the femurs consistent with osteopenia.        Electronically signed by:Naif Garcia  Date:                                            08/09/2023  Time:                                           15:05      Exam Ended: 08/09/23 12:28 CDT Last Resulted: 08/09/23 15:05 CDT                 Review of Systems   Constitutional:  Negative for activity change, appetite change and fatigue.   HENT:  Negative for dental problem, hearing loss, tinnitus, trouble swallowing and goiter.    Eyes:  Negative for photophobia, pain and visual disturbance.   Respiratory:  Negative for cough, chest tightness and wheezing.    Cardiovascular:  Negative for chest pain, palpitations and leg swelling.   Gastrointestinal:  Negative for abdominal pain, constipation, diarrhea, nausea and reflux.   Endocrine: Negative for cold intolerance, heat intolerance, polydipsia and polyphagia.   Genitourinary:  Negative for difficulty urinating, flank pain, hematuria, hot flashes, menstrual irregularity, menstrual problem, nocturia and urgency.   Musculoskeletal:  Negative for back pain, gait problem, joint swelling, leg pain and joint deformity.   Integumentary:  Negative for color change, pallor, rash and breast discharge.   Allergic/Immunologic: Negative for environmental allergies, food allergies and immunocompromised state.    Neurological:  Negative for tremors, seizures, headaches, memory loss and coordination difficulties.   Psychiatric/Behavioral:  Negative for agitation, behavioral problems and sleep disturbance. The patient is not nervous/anxious.           Objective     Physical Exam  Constitutional:       General: She is not in acute distress.     Appearance: Normal appearance. She is not ill-appearing.   HENT:      Head: Normocephalic and atraumatic.      Right Ear: External ear normal.      Left Ear: External ear normal.      Nose: Nose normal. No congestion or rhinorrhea.      Mouth/Throat:      Mouth: Mucous membranes are moist.      Pharynx: Oropharynx is clear. No oropharyngeal exudate.   Eyes:      General:         Right eye: No discharge.         Left eye: No discharge.      Conjunctiva/sclera: Conjunctivae normal.      Pupils: Pupils are equal, round, and reactive to light.   Neck:      Thyroid: No thyroid mass, thyromegaly or thyroid tenderness.   Cardiovascular:      Rate and Rhythm: Normal rate and regular rhythm.      Pulses: Normal pulses.      Heart sounds: Normal heart sounds. No murmur heard.  Pulmonary:      Effort: Pulmonary effort is normal. No respiratory distress.      Breath sounds: Normal breath sounds.   Abdominal:      General: Abdomen is flat. Bowel sounds are normal. There is no distension.      Palpations: Abdomen is soft.      Tenderness: There is no abdominal tenderness.   Musculoskeletal:         General: No swelling or tenderness. Normal range of motion.      Cervical back: Normal range of motion and neck supple. No tenderness.      Right lower leg: No edema.      Left lower leg: No edema.   Feet:      Right foot:      Skin integrity: Skin integrity normal.      Left foot:      Skin integrity: Skin integrity normal.   Lymphadenopathy:      Cervical: No cervical adenopathy.   Skin:     General: Skin is warm and dry.      Coloration: Skin is not jaundiced or pale.   Neurological:      General: No  focal deficit present.      Mental Status: She is alert and oriented to person, place, and time. Mental status is at baseline.      Coordination: Coordination normal.      Gait: Gait normal.   Psychiatric:         Mood and Affect: Mood normal.         Behavior: Behavior normal.         Thought Content: Thought content normal.            Assessment and Plan     1. Hyperparathyroidism  Calcium 10.4 on 09/13/2024   .4 on 07/21/2024   Ultrasound of thyroid 06/01/2022 no adenoma seen  NM parathyroid 06/01/2022 no adenoma seen  CT 4D parathyroid 08/10/2023 no adenoma seen   Collect 24 hour urine calcium  Component Ref Range & Units 4 mo ago  (9/13/24) 10 mo ago  (3/9/24) 10 mo ago  (3/8/24) 11 mo ago  (2/23/24) 1 yr ago  (8/24/23) 1 yr ago  (7/21/23) 1 yr ago  (3/14/23)   Sodium 136 - 145 mmol/L 143 138 139 139 143 141 144   Potassium 3.5 - 5.1 mmol/L 4.4 3.9 4.1 4.3 4.5 4.0 4.4   Chloride 98 - 107 mmol/L 108 High  107 107 106 106 106 105   CO2 23 - 31 mmol/L 29 23 24 33 High  26 28 28   Glucose 82 - 115 mg/dL 95 135 High  116 High  94 100 83 110   Blood Urea Nitrogen 9.8 - 20.1 mg/dL 12.6 9.4 Low  9.7 Low  16.2 12.3 11.6 9.8   Creatinine 0.55 - 1.02 mg/dL 0.62 0.60 0.66 0.66 0.67 0.68 0.73   Calcium 8.4 - 10.2 mg/dL 10.4 High  9.5 9.5 10.4 High  10.2 10.4 High  10.8 High      Component Ref Range & Units 1 yr ago   PARATHYROID HORMONE INTACT 8.7 - 77.0 pg/mL 120.4 High    -     Calcium, 24 Hr Urine; Future; Expected date: 01/28/2025  -     Creatinine, 24 Hr Urine; Future; Expected date: 01/28/2025  -     Renal Function Panel; Future; Expected date: 01/28/2025  -     PTH, Intact; Future; Expected date: 01/28/2025  -     Vitamin D; Future; Expected date: 01/28/2025  -     Magnesium; Future; Expected date: 01/28/2025    2. Hypercalcemia  See Above  -     Calcium, 24 Hr Urine; Future; Expected date: 01/28/2025  -     Creatinine, 24 Hr Urine; Future; Expected date: 01/28/2025    3. Vitamin D deficiency  Vitamin D level  64.7 on 07/21/2023         Component Ref Range & Units 1 yr ago  (7/21/23) 1 yr ago  (2/10/23)   Vitamin D 30.0 - 80.0 ng/mL 64.7 45.0      -     Vitamin D; Future; Expected date: 01/28/2025        Follow up in about 6 months (around 7/28/2025) for Hyperparathyroidism.    I spent a total of 30 minutes on the day of the visit.  This includes face to face time and non-face to face time preparing to see the patient (eg, review of tests), obtaining and/or reviewing separately obtained history, documenting clinical information in the electronic or other health record, independently interpreting results and communicating results to the patient/family/caregiver, or care coordinator.

## 2025-01-30 ENCOUNTER — APPOINTMENT (OUTPATIENT)
Dept: LAB | Facility: HOSPITAL | Age: 80
End: 2025-01-30
Attending: NURSE PRACTITIONER
Payer: MEDICARE

## 2025-01-30 LAB
CALCIUM 24H UR-MCNC: 240 MG/DAY (ref 100–300)
CALCIUM UR-MCNC: 20 MG/DL
CREAT 24H UR-MCNC: 894 MG/DAY (ref 950–2490)
CREAT UR-MCNC: 74.5 MG/DL (ref 45–106)
TOTAL VOLUME  (OHS): 1200 ML
TOTAL VOLUME  (OHS): 1200 ML

## 2025-02-20 ENCOUNTER — TELEPHONE (OUTPATIENT)
Dept: ENDOCRINOLOGY | Facility: CLINIC | Age: 80
End: 2025-02-20
Payer: MEDICARE

## 2025-02-20 NOTE — TELEPHONE ENCOUNTER
----- Message from Viki sent at 2/20/2025 12:26 PM CST -----  Regarding: Pt Call Back/  Patient of TanaOliver called into the clinic requesting to have her lab results sent to Mini Waldrop MD. Patient would like a call back.[phone] Santos,

## 2025-03-26 ENCOUNTER — HOSPITAL ENCOUNTER (OUTPATIENT)
Dept: RADIOLOGY | Facility: CLINIC | Age: 80
Discharge: HOME | End: 2025-03-26
Attending: SPECIALIST
Payer: MEDICARE

## 2025-03-26 ENCOUNTER — OFFICE VISIT (OUTPATIENT)
Dept: ORTHOPEDICS | Facility: CLINIC | Age: 80
End: 2025-03-26
Payer: MEDICARE

## 2025-03-26 VITALS
WEIGHT: 182 LBS | HEART RATE: 65 BPM | SYSTOLIC BLOOD PRESSURE: 160 MMHG | DIASTOLIC BLOOD PRESSURE: 75 MMHG | HEIGHT: 66 IN | BODY MASS INDEX: 29.25 KG/M2

## 2025-03-26 DIAGNOSIS — Z96.651 S/P TOTAL KNEE REPLACEMENT, RIGHT: ICD-10-CM

## 2025-03-26 DIAGNOSIS — Z96.651 S/P TOTAL KNEE REPLACEMENT, RIGHT: Primary | ICD-10-CM

## 2025-03-26 PROCEDURE — 3077F SYST BP >= 140 MM HG: CPT | Mod: CPTII,,, | Performed by: SPECIALIST

## 2025-03-26 PROCEDURE — 1159F MED LIST DOCD IN RCRD: CPT | Mod: CPTII,,, | Performed by: SPECIALIST

## 2025-03-26 PROCEDURE — 73564 X-RAY EXAM KNEE 4 OR MORE: CPT | Mod: RT,,, | Performed by: SPECIALIST

## 2025-03-26 PROCEDURE — 99213 OFFICE O/P EST LOW 20 MIN: CPT | Mod: ,,, | Performed by: SPECIALIST

## 2025-03-26 PROCEDURE — 3288F FALL RISK ASSESSMENT DOCD: CPT | Mod: CPTII,,, | Performed by: SPECIALIST

## 2025-03-26 PROCEDURE — 1101F PT FALLS ASSESS-DOCD LE1/YR: CPT | Mod: CPTII,,, | Performed by: SPECIALIST

## 2025-03-26 PROCEDURE — 3078F DIAST BP <80 MM HG: CPT | Mod: CPTII,,, | Performed by: SPECIALIST

## 2025-03-26 NOTE — PROGRESS NOTES
Past Medical History:   Diagnosis Date    Arthritis     Calculus of kidney     Digestive disorder     Hyperlipemia     Liver disease     fatty liver and cyst on liver    Primary osteoarthritis of right knee 03/07/2024    Seasonal allergies        Past Surgical History:   Procedure Laterality Date    CATARACT EXTRACTION W/  INTRAOCULAR LENS IMPLANT Bilateral     COLONOSCOPY      EXTRACORPOREAL SHOCK WAVE LITHOTRIPSY Left 04/11/2023    Procedure: LITHOTRIPSY, ESWL Left  Renal Calculus  / Possible Bilateral Retrogrades;  Surgeon: Be Titus MD;  Location: [facility];  Service: Urology;  Laterality: Left;    ROBOTIC ARTHROPLASTY, KNEE Right 3/7/2024    Procedure: ROBOTIC ARTHROPLASTY, KNEE, TOTAL;  Surgeon: Be Reynoso MD;  Location: [facility];  Service: Orthopedics;  Laterality: Right;  Jose Armando    TONSILLECTOMY      TOTAL KNEE ARTHROPLASTY Left     TUBAL LIGATION         Current Outpatient Medications   Medication Sig    ascorbic acid, vitamin C, (VITAMIN C) 500 MG tablet Take 500 mg by mouth once daily.    busPIRone (BUSPAR) 7.5 MG tablet Take 7.5 mg by mouth as needed.    cetirizine (ZYRTEC) 5 MG tablet Take 5 mg by mouth once daily.    co-enzyme Q-10 50 mg capsule Take 50 mg by mouth once daily.    dicyclomine (BENTYL) 20 mg tablet Take 20 mg by mouth as needed.    famotidine (PEPCID) 20 MG tablet Take 20 mg by mouth as needed.    pantoprazole (PROTONIX) 40 MG tablet Take 40 mg by mouth daily as needed.    pravastatin (PRAVACHOL) 10 MG tablet Take 10 mg by mouth once daily.    tobramycin sulfate 0.3% (TOBREX) 0.3 % ophthalmic solution     vit C,N-Ov-xqrpv-lutein-zeaxan (PRESERVISION AREDS-2) 250-90-40-1 mg Cap Take 1 tablet by mouth Daily.    vitamin D (VITAMIN D3) 1000 units Tab Take 1,000 Units by mouth once daily.    aspirin 81 MG Chew Take 1 tablet (81 mg total) by mouth 2 (two) times daily. (Patient not taking: Reported on 1/28/2025)    atorvastatin (LIPITOR) 10 MG tablet Take 5 mg by mouth every evening.  (Patient not taking: Reported on 3/26/2025)    lorazepam (ATIVAN) 1 MG tablet Take 0.5 tablets (0.5 mg total) by mouth every 8 (eight) hours as needed for Anxiety.     No current facility-administered medications for this visit.       Review of patient's allergies indicates:   Allergen Reactions    Bactrim [sulfamethoxazole-trimethoprim] Nausea And Vomiting    Chlorhexidine      rash    Ezetimibe Itching    Levofloxacin Other (See Comments)    Lortab [hydrocodone-acetaminophen] Itching    Milk containing products (dairy)     Tylenol-codeine #3 [acetaminophen-codeine] Other (See Comments)       Family History   Problem Relation Name Age of Onset    Hypertension Mother      Hypertension Father      Hypertension Sister      Breast cancer Sister      Hypertension Brother      No Known Problems Maternal Aunt      No Known Problems Maternal Uncle      No Known Problems Paternal Aunt      No Known Problems Paternal Uncle      No Known Problems Maternal Grandmother      No Known Problems Maternal Grandfather      No Known Problems Paternal Grandmother      No Known Problems Paternal Grandfather      No Known Problems Other      Anesthesia problems Neg Hx      Broken bones Neg Hx      Cancer Neg Hx      Clotting disorder Neg Hx      Collagen disease Neg Hx      Diabetes Neg Hx      Dislocations Neg Hx      Osteoporosis Neg Hx      Rheumatologic disease Neg Hx      Scoliosis Neg Hx      Severe sprains Neg Hx         Social History[1]    Chief Complaint:   Chief Complaint   Patient presents with    Right Knee - Follow-up     Pt states she is doing fairly well. Recently joined the wellness program at the gym. Intermittent tightness and pain at times. No major complaints.       Consulting Physician: No ref. provider found    History of present illness:    This is a 79 y.o. year old female who is doing well with no pain in either knee 1 year postop from right total knee arthroplasty.  She is now going to [employer] for a  "wellness program and is trying to get in shape.    Review of Systems:    Constitution:   Denies chills, fever, and sweats.  HENT:   Denies headaches or blurry vision.  Cardiovascular:  Denies chest pain or irregular heart beat.  Respiratory:   Denies cough or shortness of breath.  Gastrointestinal:  Denies abdominal pain, nausea, or vomiting.  Musculoskeletal:   Denies muscle cramps.  Neurological:   Denies dizziness or focal weakness.  Psychiatric/Behavior: Normal mental status.  Hematology/Lymph:  Denies bleeding problem or easy bruising/bleeding.  Skin:    Denies rash or suspicious lesions.    Examination:    Vital Signs:    Vitals:    03/26/25 1246   BP: (!) 160/75   Pulse: 65   Weight: 82.6 kg (182 lb)   Height: 5' 6" (1.676 m)       Body mass index is 29.38 kg/m².    Constitution:   Well-developed, well nourished patient in no acute distress.  Neurological:   Alert and oriented x 3 and cooperative to examination.     Psychiatric/Behavior: Normal mental status.  Respiratory:   No shortness of breath.non labored breathing.  Cardiovascular: Regular rate and rhythm  Eyes:    Extraoccular muscles intact  Skin:    No scars, rash or suspicious lesions.    Physical Exam:  Right and left Knee     No swelling, warmth or tenderness.    No erythema    No tenderness    Well healed scar    No instability of the knee in mid or deep flexion     Symmetrically balanced collateral ligaments throughout ROM    Active flexion 130 degrees right knee and active flexion 125 left knee    Active extension 0 degrees     No weakness was observed.    No atrophy    Sensation intact distally    Intact pedal pulses     A complete knee x-ray with standing 3 views was performed -of right and left knee.     Impressions Radiology Test   X-ray of knee was performed intact  knee implant without signs of loosening or subsidence.    Imaging: X-rays ordered and images interpreted today personally by me of four views of the right and left knees which " show pristine interfaces and stable well-aligned bilateral total knee arthroplasties.  Impression: As above.         Assessment: S/P total knee replacement, right  -     X-Ray Knee Complete 4 Or More Views Right; Future; Expected date: 03/26/2025        Plan:  Activities as tolerated and follow up p.r.n.      DISCLAIMER: This note may have been dictated using voice recognition software and may contain grammatical errors.     NOTE: Consult report sent to referring provider via [employer] EMR.         [1]   Social History  Socioeconomic History    Marital status:    Tobacco Use    Smoking status: Never    Smokeless tobacco: Never   Substance and Sexual Activity    Alcohol use: Never    Drug use: Never    Sexual activity: Yes     Partners: Male     Social Drivers of Health     Financial Resource Strain: High Risk (10/24/2024)    Received from [facility] SDOH Screening     In the past year, have you been unable to get any of the following when you really needed them? choose all that apply.: Clothing

## (undated) DEVICE — APPLICATOR CHLORAPREP ORN 26ML

## (undated) DEVICE — COVER TABLE HVY DTY 60X90IN

## (undated) DEVICE — KIT TRIATHLON CR TIB PREP SZ3

## (undated) DEVICE — PADDING WYTEX UNDRCST 6INX4YD

## (undated) DEVICE — BLADE MAKO STANDARD

## (undated) DEVICE — Device

## (undated) DEVICE — ELECTRODE PATIENT RETURN DISP

## (undated) DEVICE — CORD SILICONE RETRACTOR

## (undated) DEVICE — KIT DRAPE RIO ONE PIECE W/POCK

## (undated) DEVICE — SOL NACL IRR 3000ML

## (undated) DEVICE — CUFF ATS 2 PORT SNGL BLDR 34IN

## (undated) DEVICE — KIT VIZADISC KNEE TRACKING

## (undated) DEVICE — WRAP DEMAYO LEG STERILE

## (undated) DEVICE — GLOVE SENSICARE PI MICRO 8

## (undated) DEVICE — SUT ETHIBOND XTRA 1 CTX

## (undated) DEVICE — KIT CHECKPOINT TIBIAL

## (undated) DEVICE — SUT VICRYL 2-0 36 CT-1

## (undated) DEVICE — TAPE SILK 3IN

## (undated) DEVICE — SOL NACL IRR 1000ML BTL

## (undated) DEVICE — GAUZE SPONGE 4X4 12PLY

## (undated) DEVICE — DRESSING N ADH OIL EMUL 3X8 3S

## (undated) DEVICE — DRAPE FULL SHEET 70X100IN

## (undated) DEVICE — PAD ABD 8X10 STERILE

## (undated) DEVICE — SYR 30CC LUER LOCK

## (undated) DEVICE — PIN BONE 3.2X110MM
Type: IMPLANTABLE DEVICE | Site: KNEE | Status: NON-FUNCTIONAL
Removed: 2024-03-07

## (undated) DEVICE — BLADE SAG DUAL CUT 25X90MM

## (undated) DEVICE — GLOVE SENSICARE PI GRN 8

## (undated) DEVICE — SUPPORT ULNA NERVE PROTECTOR

## (undated) DEVICE — KIT SURGICAL TURNOVER

## (undated) DEVICE — DRAPE STERI U-SHAPED 47X51IN

## (undated) DEVICE — HOOD FLYTE SURGICOOL

## (undated) DEVICE — SOL POVIDONE IODINE PCH 3/4OZ

## (undated) DEVICE — GOWN POLY REINF X-LONG 2XL

## (undated) DEVICE — DRAPE MEDIUM SHEET 40X70IN

## (undated) DEVICE — CUSHION  WC FOAM 20X20X.75IN

## (undated) DEVICE — SUT STRATAFIX PDS+ 1 CTX 24IN